# Patient Record
Sex: MALE | Race: WHITE | NOT HISPANIC OR LATINO | Employment: OTHER | URBAN - METROPOLITAN AREA
[De-identification: names, ages, dates, MRNs, and addresses within clinical notes are randomized per-mention and may not be internally consistent; named-entity substitution may affect disease eponyms.]

---

## 2018-11-14 ENCOUNTER — HISTORICAL (OUTPATIENT)
Dept: LAB | Facility: HOSPITAL | Age: 83
End: 2018-11-14

## 2018-11-14 LAB
ABS NEUT (OLG): 3.7 X10(3)/MCL (ref 1.5–6.9)
ALBUMIN SERPL-MCNC: 3.5 GM/DL (ref 3.4–5)
ALBUMIN/GLOB SERPL: 0.9 RATIO
ALP SERPL-CCNC: 68 UNIT/L (ref 30–113)
ALT SERPL-CCNC: 23 UNIT/L (ref 10–45)
AST SERPL-CCNC: 22 UNIT/L (ref 15–37)
BASOPHILS # BLD AUTO: 0.1 X10(3)/MCL (ref 0–0.1)
BASOPHILS NFR BLD AUTO: 1 % (ref 0–1)
BILIRUB SERPL-MCNC: 0.4 MG/DL (ref 0.1–0.9)
BILIRUBIN DIRECT+TOT PNL SERPL-MCNC: 0.1 MG/DL (ref 0–0.3)
BILIRUBIN DIRECT+TOT PNL SERPL-MCNC: 0.3 MG/DL
BUN SERPL-MCNC: 17 MG/DL (ref 10–20)
CALCIUM SERPL-MCNC: 9.4 MG/DL (ref 8–10.5)
CHLORIDE SERPL-SCNC: 104 MMOL/L (ref 100–108)
CO2 SERPL-SCNC: 32 MMOL/L (ref 21–35)
CREAT SERPL-MCNC: 1.48 MG/DL (ref 0.7–1.3)
EOSINOPHIL # BLD AUTO: 0.2 X10(3)/MCL (ref 0–0.6)
EOSINOPHIL NFR BLD AUTO: 2 % (ref 0–5)
ERYTHROCYTE [DISTWIDTH] IN BLOOD BY AUTOMATED COUNT: 13.2 % (ref 11.5–17)
GLOBULIN SER-MCNC: 3.9 GM/DL
GLUCOSE SERPL-MCNC: 88 MG/DL (ref 75–116)
HCT VFR BLD AUTO: 44.7 % (ref 42–52)
HGB BLD-MCNC: 14.1 GM/DL (ref 14–18)
IMM GRANULOCYTES # BLD AUTO: 0.01 10*3/UL (ref 0–0.02)
IMM GRANULOCYTES NFR BLD AUTO: 0.1 % (ref 0–0.43)
LDH SERPL-CCNC: 193 UNIT/L (ref 105–241)
LYMPHOCYTES # BLD AUTO: 2.5 X10(3)/MCL (ref 0.5–4.1)
LYMPHOCYTES NFR BLD AUTO: 35 % (ref 15–40)
MCH RBC QN AUTO: 30 PG (ref 27–34)
MCHC RBC AUTO-ENTMCNC: 32 GM/DL (ref 31–36)
MCV RBC AUTO: 96 FL (ref 80–99)
MONOCYTES # BLD AUTO: 0.6 X10(3)/MCL (ref 0–1.1)
MONOCYTES NFR BLD AUTO: 8 % (ref 4–12)
NEUTROPHILS # BLD AUTO: 3.7 X10(3)/MCL (ref 1.5–6.9)
NEUTROPHILS NFR BLD AUTO: 53 % (ref 43–75)
PLATELET # BLD AUTO: 257 X10(3)/MCL (ref 140–400)
PMV BLD AUTO: 10.3 FL (ref 6.8–10)
POTASSIUM SERPL-SCNC: 4.4 MMOL/L (ref 3.6–5.2)
PROT SERPL-MCNC: 7.4 GM/DL (ref 6.4–8.2)
RBC # BLD AUTO: 4.65 X10(6)/MCL (ref 4.7–6.1)
SODIUM SERPL-SCNC: 143 MMOL/L (ref 135–145)
WBC # SPEC AUTO: 7 X10(3)/MCL (ref 4.5–11.5)

## 2019-05-22 ENCOUNTER — HISTORICAL (OUTPATIENT)
Dept: LAB | Facility: HOSPITAL | Age: 84
End: 2019-05-22

## 2019-05-22 LAB
ABS NEUT (OLG): 3.6 X10(3)/MCL (ref 1.5–6.9)
ALBUMIN SERPL-MCNC: 3.8 GM/DL (ref 3.4–5)
ALBUMIN/GLOB SERPL: 1 RATIO
ALP SERPL-CCNC: 78 UNIT/L (ref 30–113)
ALT SERPL-CCNC: 21 UNIT/L (ref 10–45)
AST SERPL-CCNC: 21 UNIT/L (ref 15–37)
BASOPHILS # BLD AUTO: 0.1 X10(3)/MCL (ref 0–0.1)
BASOPHILS NFR BLD AUTO: 1 % (ref 0–1)
BILIRUB SERPL-MCNC: 0.6 MG/DL (ref 0.1–0.9)
BILIRUBIN DIRECT+TOT PNL SERPL-MCNC: 0.1 MG/DL (ref 0–0.3)
BILIRUBIN DIRECT+TOT PNL SERPL-MCNC: 0.5 MG/DL
BUN SERPL-MCNC: 19 MG/DL (ref 10–20)
CALCIUM SERPL-MCNC: 9.1 MG/DL (ref 8–10.5)
CHLORIDE SERPL-SCNC: 104 MMOL/L (ref 100–108)
CO2 SERPL-SCNC: 28 MMOL/L (ref 21–35)
CREAT SERPL-MCNC: 1.76 MG/DL (ref 0.7–1.3)
EOSINOPHIL # BLD AUTO: 0.2 X10(3)/MCL (ref 0–0.6)
EOSINOPHIL NFR BLD AUTO: 2 % (ref 0–5)
ERYTHROCYTE [DISTWIDTH] IN BLOOD BY AUTOMATED COUNT: 13 % (ref 11.5–17)
GLOBULIN SER-MCNC: 3.9 GM/DL
GLUCOSE SERPL-MCNC: 77 MG/DL (ref 75–116)
HCT VFR BLD AUTO: 45 % (ref 42–52)
HGB BLD-MCNC: 14.4 GM/DL (ref 14–18)
IMM GRANULOCYTES # BLD AUTO: 0.01 10*3/UL (ref 0–0.02)
IMM GRANULOCYTES NFR BLD AUTO: 0.1 % (ref 0–0.43)
LDH SERPL-CCNC: 193 UNIT/L (ref 105–241)
LYMPHOCYTES # BLD AUTO: 2.8 X10(3)/MCL (ref 0.5–4.1)
LYMPHOCYTES NFR BLD AUTO: 39 % (ref 15–40)
MCH RBC QN AUTO: 32 PG (ref 27–34)
MCHC RBC AUTO-ENTMCNC: 32 GM/DL (ref 31–36)
MCV RBC AUTO: 98 FL (ref 80–99)
MONOCYTES # BLD AUTO: 0.5 X10(3)/MCL (ref 0–1.1)
MONOCYTES NFR BLD AUTO: 7 % (ref 4–12)
NEUTROPHILS # BLD AUTO: 3.6 X10(3)/MCL (ref 1.5–6.9)
NEUTROPHILS NFR BLD AUTO: 50 % (ref 43–75)
PLATELET # BLD AUTO: 218 X10(3)/MCL (ref 140–400)
PMV BLD AUTO: 10 FL (ref 6.8–10)
POTASSIUM SERPL-SCNC: 4.5 MMOL/L (ref 3.6–5.2)
PROT SERPL-MCNC: 7.7 GM/DL (ref 6.4–8.2)
RBC # BLD AUTO: 4.57 X10(6)/MCL (ref 4.7–6.1)
SODIUM SERPL-SCNC: 141 MMOL/L (ref 135–145)
WBC # SPEC AUTO: 7.2 X10(3)/MCL (ref 4.5–11.5)

## 2019-07-22 ENCOUNTER — HISTORICAL (OUTPATIENT)
Dept: LAB | Facility: HOSPITAL | Age: 84
End: 2019-07-22

## 2019-07-22 LAB
ABS NEUT (OLG): 3.3 X10(3)/MCL (ref 1.5–6.9)
ALBUMIN SERPL-MCNC: 3.6 GM/DL (ref 3.4–5)
ALBUMIN/GLOB SERPL: 0.9 RATIO
ALP SERPL-CCNC: 83 UNIT/L (ref 30–113)
ALT SERPL-CCNC: 21 UNIT/L (ref 10–45)
AST SERPL-CCNC: 24 UNIT/L (ref 15–37)
BASOPHILS # BLD AUTO: 0.1 X10(3)/MCL (ref 0–0.1)
BASOPHILS NFR BLD AUTO: 1 % (ref 0–1)
BILIRUB SERPL-MCNC: 0.5 MG/DL (ref 0.1–0.9)
BILIRUBIN DIRECT+TOT PNL SERPL-MCNC: 0.1 MG/DL (ref 0–0.3)
BILIRUBIN DIRECT+TOT PNL SERPL-MCNC: 0.4 MG/DL
BUN SERPL-MCNC: 18 MG/DL (ref 10–20)
CALCIUM SERPL-MCNC: 8.6 MG/DL (ref 8–10.5)
CHLORIDE SERPL-SCNC: 104 MMOL/L (ref 100–108)
CO2 SERPL-SCNC: 27 MMOL/L (ref 21–35)
CREAT SERPL-MCNC: 1.6 MG/DL (ref 0.7–1.3)
EOSINOPHIL # BLD AUTO: 0.2 X10(3)/MCL (ref 0–0.6)
EOSINOPHIL NFR BLD AUTO: 3 % (ref 0–5)
ERYTHROCYTE [DISTWIDTH] IN BLOOD BY AUTOMATED COUNT: 13 % (ref 11.5–17)
GLOBULIN SER-MCNC: 3.8 GM/DL
GLUCOSE SERPL-MCNC: 81 MG/DL (ref 75–116)
HCT VFR BLD AUTO: 42.2 % (ref 42–52)
HGB BLD-MCNC: 13.8 GM/DL (ref 14–18)
IMM GRANULOCYTES # BLD AUTO: 0.01 10*3/UL (ref 0–0.02)
IMM GRANULOCYTES NFR BLD AUTO: 0.2 % (ref 0–0.43)
LDH SERPL-CCNC: 219 UNIT/L (ref 105–241)
LYMPHOCYTES # BLD AUTO: 2.5 X10(3)/MCL (ref 0.5–4.1)
LYMPHOCYTES NFR BLD AUTO: 38 % (ref 15–40)
MCH RBC QN AUTO: 31 PG (ref 27–34)
MCHC RBC AUTO-ENTMCNC: 33 GM/DL (ref 31–36)
MCV RBC AUTO: 96 FL (ref 80–99)
MONOCYTES # BLD AUTO: 0.5 X10(3)/MCL (ref 0–1.1)
MONOCYTES NFR BLD AUTO: 8 % (ref 4–12)
NEUTROPHILS # BLD AUTO: 3.3 X10(3)/MCL (ref 1.5–6.9)
NEUTROPHILS NFR BLD AUTO: 51 % (ref 43–75)
PLATELET # BLD AUTO: 212 X10(3)/MCL (ref 140–400)
PMV BLD AUTO: 9.7 FL (ref 6.8–10)
POTASSIUM SERPL-SCNC: 4.2 MMOL/L (ref 3.6–5.2)
PROT SERPL-MCNC: 7.4 GM/DL (ref 6.4–8.2)
RBC # BLD AUTO: 4.41 X10(6)/MCL (ref 4.7–6.1)
SODIUM SERPL-SCNC: 140 MMOL/L (ref 135–145)
WBC # SPEC AUTO: 6.5 X10(3)/MCL (ref 4.5–11.5)

## 2020-01-20 ENCOUNTER — HISTORICAL (OUTPATIENT)
Dept: LAB | Facility: HOSPITAL | Age: 85
End: 2020-01-20

## 2020-01-20 LAB
ABS NEUT (OLG): 3.7 X10(3)/MCL (ref 1.5–6.9)
ALBUMIN SERPL-MCNC: 3.9 GM/DL (ref 3.4–5)
ALBUMIN/GLOB SERPL: 1 RATIO
ALP SERPL-CCNC: 91 UNIT/L (ref 30–113)
ALT SERPL-CCNC: 22 UNIT/L (ref 10–45)
AST SERPL-CCNC: 20 UNIT/L (ref 15–37)
BASOPHILS # BLD AUTO: 0.1 X10(3)/MCL (ref 0–0.1)
BASOPHILS NFR BLD AUTO: 1 % (ref 0–1)
BILIRUB SERPL-MCNC: 0.4 MG/DL (ref 0.1–0.9)
BILIRUBIN DIRECT+TOT PNL SERPL-MCNC: 0.1 MG/DL (ref 0–0.3)
BILIRUBIN DIRECT+TOT PNL SERPL-MCNC: 0.3 MG/DL
BUN SERPL-MCNC: 18 MG/DL (ref 10–20)
CALCIUM SERPL-MCNC: 9.4 MG/DL (ref 8–10.5)
CHLORIDE SERPL-SCNC: 103 MMOL/L (ref 100–108)
CO2 SERPL-SCNC: 29 MMOL/L (ref 21–35)
CREAT SERPL-MCNC: 1.42 MG/DL (ref 0.7–1.3)
EOSINOPHIL # BLD AUTO: 0.2 X10(3)/MCL (ref 0–0.6)
EOSINOPHIL NFR BLD AUTO: 2 % (ref 0–5)
ERYTHROCYTE [DISTWIDTH] IN BLOOD BY AUTOMATED COUNT: 12.7 % (ref 11.5–17)
GLOBULIN SER-MCNC: 4 GM/DL
GLUCOSE SERPL-MCNC: 74 MG/DL (ref 75–116)
HCT VFR BLD AUTO: 45.5 % (ref 42–52)
HGB BLD-MCNC: 14.8 GM/DL (ref 14–18)
IMM GRANULOCYTES # BLD AUTO: 0.01 10*3/UL (ref 0–0.02)
IMM GRANULOCYTES NFR BLD AUTO: 0.1 % (ref 0–0.43)
LYMPHOCYTES # BLD AUTO: 2.9 X10(3)/MCL (ref 0.5–4.1)
LYMPHOCYTES NFR BLD AUTO: 40 % (ref 15–40)
MCH RBC QN AUTO: 32 PG (ref 27–34)
MCHC RBC AUTO-ENTMCNC: 32 GM/DL (ref 31–36)
MCV RBC AUTO: 98 FL (ref 80–99)
MONOCYTES # BLD AUTO: 0.5 X10(3)/MCL (ref 0–1.1)
MONOCYTES NFR BLD AUTO: 6 % (ref 4–12)
NEUTROPHILS # BLD AUTO: 3.7 X10(3)/MCL (ref 1.5–6.9)
NEUTROPHILS NFR BLD AUTO: 50 % (ref 43–75)
PLATELET # BLD AUTO: 236 X10(3)/MCL (ref 140–400)
PMV BLD AUTO: 9.9 FL (ref 6.8–10)
POTASSIUM SERPL-SCNC: 4.3 MMOL/L (ref 3.6–5.2)
PROT SERPL-MCNC: 7.9 GM/DL (ref 6.4–8.2)
RBC # BLD AUTO: 4.62 X10(6)/MCL (ref 4.7–6.1)
SODIUM SERPL-SCNC: 141 MMOL/L (ref 135–145)
WBC # SPEC AUTO: 7.3 X10(3)/MCL (ref 4.5–11.5)

## 2023-01-24 DIAGNOSIS — R22.1 NECK MASS: Primary | ICD-10-CM

## 2023-01-25 ENCOUNTER — OFFICE VISIT (OUTPATIENT)
Dept: OTOLARYNGOLOGY | Facility: CLINIC | Age: 88
End: 2023-01-25
Payer: MEDICARE

## 2023-01-25 VITALS
WEIGHT: 163.19 LBS | DIASTOLIC BLOOD PRESSURE: 91 MMHG | SYSTOLIC BLOOD PRESSURE: 136 MMHG | HEART RATE: 82 BPM | TEMPERATURE: 98 F

## 2023-01-25 DIAGNOSIS — R22.1 NECK MASS: ICD-10-CM

## 2023-01-25 DIAGNOSIS — C07 CARCINOMA OF PAROTID GLAND: Primary | ICD-10-CM

## 2023-01-25 PROCEDURE — 99213 OFFICE O/P EST LOW 20 MIN: CPT | Mod: PBBFAC | Performed by: STUDENT IN AN ORGANIZED HEALTH CARE EDUCATION/TRAINING PROGRAM

## 2023-01-25 RX ORDER — TAMSULOSIN HYDROCHLORIDE 0.4 MG/1
1 CAPSULE ORAL NIGHTLY
COMMUNITY
Start: 2022-10-26 | End: 2023-05-25

## 2023-01-25 RX ORDER — TRIMETHOPRIM 100 MG/1
100 TABLET ORAL DAILY
COMMUNITY
Start: 2022-10-26

## 2023-01-25 NOTE — PROGRESS NOTES
[facility]  Otolaryngology-Head & Neck Surgery    Office Visit    Erik Lundy  [id number]  1935    CC: parotid mass    HPI: Erik Lundy is a 87 y.o. 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  Patient is not sure what type of skin cancer he had, but he was treated by Dr. Biswas with Mohs resection.  Patient states he noticed the mass about a month ago and was initially evaluated by Dr. Jerry.  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  He also reports 7 lb weight loss over the last 2 months.  He denies other new onset symptoms including dysphagia, odynophagia, dysphonia, dyspnea, hematemesis, hemoptysis, facial weakness, new skin lesions or additional masses in the head or neck.  Non-contrasted CT neck was performed yesterday which showed approximately 2 cm mass of the left parotid without associated cervical lymphadenopathy.    ROS:   General: Negative except per HPI  Skin: Denies rash, ulcer, or lesion.  Eyes: Denies vision changes or diplopia.  Ears: Negative except per HPI  Nose: Negative except per HPI  Throat/mouth: Negative except per HPI  Cardiovascular: Negative except per HPI  Respiratory: Negative except per HPI  Neck: Negative except per HPI  Endocrine: Negative except per HPI  Neurologic: Negative except per HPI    Review of patient's allergies indicates:   Allergen Reactions    Fluticasone propionate        Past Medical History:   Diagnosis Date    Skin cancer        History reviewed. No pertinent surgical history.    Social History     Socioeconomic History    Marital status: Single   Tobacco Use    Smoking status: Never    Smokeless tobacco: Never       History reviewed. No pertinent family history.    Outpatient Encounter Medications as of 1/25/2023   Medication Sig Dispense Refill    tamsulosin (FLOMAX) 0.4 mg Cap Take 1  capsule by mouth every evening.      trimethoprim (TRIMPEX) 100 mg Tab Take 100 mg by mouth.       No facility-administered encounter medications on file as of 1/25/2023.       PHYSICAL EXAM:  Vitals:    01/25/23 0937   BP: (!) 136/91   Pulse: 82   Temp: 97.5 °F (36.4 °C)       General Appearance: well nourished, well-developed, alert, oriented, in no acute distress  Head/Face: NC, AT, 2 cm firm mass involving the left tail of parotid, appears to be fixed to the adjacent SCM  Eyes: PEERLA, EOMI, normal conjunctiva  Ears: Hears well at normal conversation volume  AD: external normal, ear canal normal, TM intact, no middle ear fluid  AS: external normal, ear canal normal, TM intact, no middle ear fluid  Nose: septum midline, no inferior turbinate hypertrophy, no polyps  OC/OP: dentition moderate, no oral lesions, FOM and BOT soft  Nasopharynx, Hypopharynx, and Larynx: indirect visualization attempted, limited view due to patient intolerance  Neck: 2 cm firm mass involving the left tail of parotid, appears to be fixed to the adjacent SCM, no palpable lymph nodes, non-tender, thyroid- no nodules or goiter  Neuro: CN II - XII intact  Psychiatric: oriented to time, place and person, no depression, anxiety or agitation      Procedures:Flexible Fiberoptic Laryngoscopy/Nasopharyngoscopy via left nare    Procedure in Detail: Informed consent was obtained from the patient after explanation of procedure, indications, risks and benefits. Flexible endoscopy was performed through the nasal passages. The nasal cavity, nasopharynx, oropharynx, hypopharynx and larynx were adequately visualized. The true vocal cords and arytenoids were examined during phonation and repose.    Anesthesia: Topical Neosynephrine / Tetracaine  Adverse Events: None  Resident Surgeon: Constantino Younger MD  Blood loss: none  Condition: good    Findings:  NP/OP: no masses/lesions of NC, eustachian tube, fossa of Rosenmuller, no adenoid  hypertrophy  BOT/vallecula: no lingual hypertrophy, no masses/lesions, no secretions obscuring visualization  Piriform sinuses/post-cricoid: no masses/lesions, no pooling of secretions  Epiglottis: lingual and laryngeal surfaces within normal limits  Arytenoids/FVFs: no masses/lesions, no edema, bilateral mobility  TVCs: bilateral cord mobility, no masses or lesions  No aspiration, no pooled secretions  No sign of malignancy      PERTINENT DATA:  Imaging:  CT neck non-contrasted      Pathology:  FNA left parotid      ASSESSMENT:  Erik Lundy is a 87 y.o. male with history of cutaneous SCCa involving the left cheek s/p WLE in 2016that presents with left parotid mass, FNA shows poorly differentiated carcinoma.    PLAN:  -- Discussed pathology findings in detail with patient and explained the next steps required for work up including obtaining a PET/CT to rule out metastatic disease prior to treatment.  All questions answered.  -- Ordered PET/CT.  Follow up imaging results and will update patient at that time.  -- Will also discuss case with H&N staff once workup is completed.    RTC in 2 weeks to discuss results of PET/CT and discuss plan for further treatment.    Constantino Younger MD  [facility]  10:11 AM 01/25/2023    Dr. Alex was present during the patient encounter and participated in examination and evaluation of the patient.

## 2023-02-16 ENCOUNTER — HOSPITAL ENCOUNTER (OUTPATIENT)
Dept: RADIOLOGY | Facility: HOSPITAL | Age: 88
Discharge: HOME | End: 2023-02-16
Attending: STUDENT IN AN ORGANIZED HEALTH CARE EDUCATION/TRAINING PROGRAM
Payer: MEDICARE

## 2023-02-16 DIAGNOSIS — C07 CARCINOMA OF PAROTID GLAND: ICD-10-CM

## 2023-02-16 PROCEDURE — A9552 F18 FDG: HCPCS

## 2023-02-16 PROCEDURE — 78815 PET IMAGE W/CT SKULL-THIGH: CPT | Mod: TC,PI

## 2023-02-24 NOTE — PROGRESS NOTES
I reviewed the history and physical exam with the resident.   I agree with findings and plan.    Frandy Rae M.D.

## 2023-03-02 ENCOUNTER — OFFICE VISIT (OUTPATIENT)
Dept: OTOLARYNGOLOGY | Facility: CLINIC | Age: 88
End: 2023-03-02
Payer: MEDICARE

## 2023-03-02 VITALS
SYSTOLIC BLOOD PRESSURE: 126 MMHG | TEMPERATURE: 98 F | WEIGHT: 158 LBS | DIASTOLIC BLOOD PRESSURE: 85 MMHG | HEART RATE: 98 BPM

## 2023-03-02 DIAGNOSIS — E04.1 THYROID NODULE: ICD-10-CM

## 2023-03-02 DIAGNOSIS — C07 CARCINOMA OF PAROTID GLAND: Primary | ICD-10-CM

## 2023-03-02 DIAGNOSIS — R91.8 LUNG NODULES: ICD-10-CM

## 2023-03-02 PROCEDURE — 99214 OFFICE O/P EST MOD 30 MIN: CPT | Mod: PBBFAC | Performed by: STUDENT IN AN ORGANIZED HEALTH CARE EDUCATION/TRAINING PROGRAM
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
## 2023-03-10 ENCOUNTER — HOSPITAL ENCOUNTER (OUTPATIENT)
Dept: RADIOLOGY | Facility: HOSPITAL | Age: 88
Discharge: HOME | End: 2023-03-10
Attending: STUDENT IN AN ORGANIZED HEALTH CARE EDUCATION/TRAINING PROGRAM
Payer: MEDICARE

## 2023-03-10 DIAGNOSIS — C07 CARCINOMA OF PAROTID GLAND: ICD-10-CM

## 2023-03-10 DIAGNOSIS — R91.8 LUNG NODULES: ICD-10-CM

## 2023-03-10 DIAGNOSIS — C07 CARCINOMA OF PAROTID GLAND: Primary | ICD-10-CM

## 2023-03-10 DIAGNOSIS — E04.1 THYROID NODULE: ICD-10-CM

## 2023-03-10 LAB
CREAT SERPL-MCNC: 1.83 MG/DL (ref 0.73–1.18)
GFR SERPLBLD CREATININE-BSD FMLA CKD-EPI: 35 MLS/MIN/1.73/M2

## 2023-03-10 PROCEDURE — 25500020 PHARM REV CODE 255

## 2023-03-10 PROCEDURE — 70481 CT ORBIT/EAR/FOSSA W/DYE: CPT | Mod: TC

## 2023-03-10 PROCEDURE — 70491 CT SOFT TISSUE NECK W/DYE: CPT | Mod: TC

## 2023-03-10 PROCEDURE — 76536 US EXAM OF HEAD AND NECK: CPT | Mod: TC

## 2023-03-10 PROCEDURE — 71260 CT THORAX DX C+: CPT | Mod: TC

## 2023-03-10 PROCEDURE — 82565 ASSAY OF CREATININE: CPT | Performed by: STUDENT IN AN ORGANIZED HEALTH CARE EDUCATION/TRAINING PROGRAM

## 2023-03-10 RX ADMIN — IOHEXOL 100 ML: 350 INJECTION, SOLUTION INTRAVENOUS at 08:03

## 2023-03-15 ENCOUNTER — OFFICE VISIT (OUTPATIENT)
Dept: OTOLARYNGOLOGY | Facility: CLINIC | Age: 88
End: 2023-03-15
Payer: MEDICARE

## 2023-03-15 DIAGNOSIS — C07 CARCINOMA OF PAROTID GLAND: Primary | ICD-10-CM

## 2023-03-15 DIAGNOSIS — R91.8 LUNG NODULE, MULTIPLE: ICD-10-CM

## 2023-03-15 DIAGNOSIS — E04.1 THYROID NODULE: ICD-10-CM
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
## 2023-03-23 ENCOUNTER — HOSPITAL ENCOUNTER (OUTPATIENT)
Dept: INTERVENTIONAL RADIOLOGY/VASCULAR | Facility: HOSPITAL | Age: 88
Discharge: HOME | End: 2023-03-23
Attending: STUDENT IN AN ORGANIZED HEALTH CARE EDUCATION/TRAINING PROGRAM
Payer: MEDICARE

## 2023-03-23 DIAGNOSIS — E04.1 THYROID NODULE: ICD-10-CM

## 2023-03-23 PROCEDURE — 88108 CYTOPATH CONCENTRATE TECH: CPT | Mod: TC | Performed by: STUDENT IN AN ORGANIZED HEALTH CARE EDUCATION/TRAINING PROGRAM

## 2023-03-23 PROCEDURE — 10005 FNA BX W/US GDN 1ST LES: CPT

## 2023-03-27 LAB
ESTROGEN SERPL-MCNC: NORMAL PG/ML
INSULIN SERPL-ACNC: NORMAL U[IU]/ML
LAB AP CLINICAL INFORMATION: NORMAL
LAB AP EBUS/EUS SPECIMEN: NORMAL
LAB AP GROSS DESCRIPTION: NORMAL

## 2023-03-30 ENCOUNTER — OFFICE VISIT (OUTPATIENT)
Dept: OTOLARYNGOLOGY | Facility: CLINIC | Age: 88
End: 2023-03-30
Payer: MEDICARE

## 2023-03-30 VITALS — SYSTOLIC BLOOD PRESSURE: 105 MMHG | HEART RATE: 76 BPM | DIASTOLIC BLOOD PRESSURE: 61 MMHG | WEIGHT: 160.19 LBS

## 2023-03-30 DIAGNOSIS — E04.1 THYROID NODULE: ICD-10-CM

## 2023-03-30 DIAGNOSIS — R91.8 LUNG NODULES: ICD-10-CM

## 2023-03-30 DIAGNOSIS — C07 CARCINOMA OF PAROTID GLAND: Primary | ICD-10-CM

## 2023-03-30 PROCEDURE — 99212 OFFICE O/P EST SF 10 MIN: CPT | Mod: PBBFAC | Performed by: STUDENT IN AN ORGANIZED HEALTH CARE EDUCATION/TRAINING PROGRAM
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
## 2023-04-11 ENCOUNTER — OFFICE VISIT (OUTPATIENT)
Dept: FAMILY MEDICINE | Facility: CLINIC | Age: 88
End: 2023-04-11
Payer: MEDICARE

## 2023-04-11 VITALS
DIASTOLIC BLOOD PRESSURE: 72 MMHG | RESPIRATION RATE: 18 BRPM | OXYGEN SATURATION: 98 % | WEIGHT: 161.81 LBS | TEMPERATURE: 98 F | HEART RATE: 69 BPM | HEIGHT: 72 IN | SYSTOLIC BLOOD PRESSURE: 119 MMHG | BODY MASS INDEX: 21.92 KG/M2

## 2023-04-11 DIAGNOSIS — C07 CARCINOMA OF PAROTID GLAND: ICD-10-CM

## 2023-04-11 PROBLEM — L98.9 DISORDER OF SKIN OF LOWER EXTREMITY: Status: ACTIVE | Noted: 2023-04-11

## 2023-04-11 PROBLEM — E04.2 MULTINODULAR GOITER: Status: ACTIVE | Noted: 2023-04-11

## 2023-04-11 PROBLEM — C44.329 SQUAMOUS CELL CARCINOMA OF SKIN OF CHEEK: Status: ACTIVE | Noted: 2023-04-11

## 2023-04-11 PROBLEM — C79.2: Status: ACTIVE | Noted: 2023-04-11

## 2023-04-11 PROBLEM — C44.90 SKIN CANCER: Status: ACTIVE | Noted: 2023-04-11

## 2023-04-11 PROCEDURE — 99214 OFFICE O/P EST MOD 30 MIN: CPT | Mod: PBBFAC

## 2023-04-11 NOTE — PROGRESS NOTES
[facility] FM Clinic Note    CHIEF COMPLAINT:  Chief Complaint   Patient presents with    Establish Care    Cyst     Patient reports a cancerous cyst on left side of face.       HISTORY OF  PRESENT ILLNESS:  Erik Lundy is a 87 y.o. 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 who presents to clinic today for [facility] FM clinic after being referred to ENT.     Acute issues:  None  Patient has PCP Dr. Benedicto Vargas, who he saw on 04/10/2023 with 1 month f/u     Chronic issues:  1) Metastatic Squamous Cell carcinoma L cheek  initial biopsy by Dr. Simons  Mohs surgery by Dr. Esquivel 10/11/16  Primary source unknown at this time  Previously seen Dr. 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  No cervical lymphadenopathy  Follows ENT; last seen in-office 03/30/23 with f/u on 04/26/2023; performed Flexible Fiberoptic Laryngoscopy/Nasopharyngoscopy with no significant findings  FNA done 01/19/2023; positive for malignant cells; high grade primary parotid malignancy vs metastatic carcinoma   Has home health 3x/week    3) Right thyroid nodule  U/S done on 03/10/2023   Revealed Mostly cystic nodules in the right and left alyson thyroid with exception of a single solid slightly hypoechoic nodule; benign   Recommended repeat U/S in 1, 2, 3, 5 years   FNA of R thyroid nodule revealed benign thyroid nodule on 03/23/2023    4) Bilateral upper lobe pulmonary nodules  CT chest on 3/10 is reflective of PET scan done on 01/25/2023; otherwise revealed scatted punctate 1-2 mm nodules in the Right lower lobe, findings consistent w/metastatic disease per radiology  Has f/u  with pulmonary on 05/01/2023 with recommendations from ENT of requiring endobronchial ultrasound and plan for patient to be seen sooner than scheduled date.    REVIEW OF SYSTEMS:  See HPI    Past Medical History:   Diagnosis Date    Skin cancer     No past surgical history on file.   Social History     Socioeconomic History    Marital status: Single   Tobacco Use    Smoking status: Never    Smokeless tobacco: Never       Review of Most Recent Wound Care-Related Labs:  Lab Results   Component Value Date/Time    WBC 7.3 01/20/2020 12:06 PM    RBC 4.62 (L) 01/20/2020 12:06 PM    HGB 14.8 01/20/2020 12:06 PM    HCT 45.5 01/20/2020 12:06 PM    MCV 98 01/20/2020 12:06 PM    MCH 32 01/20/2020 12:06 PM    CREATININE 1.83 (H) 03/10/2023 07:31 AM        PHYSICAL EXAMINATION:  Blood pressure 119/72, pulse 69, temperature 98.3 °F (36.8 °C), temperature source Oral, resp. rate 18, height 6' (1.829 m), weight 73.4 kg (161 lb 12.8 oz), SpO2 98 %.    General:  VSS. No acute distress.   Respiratory: clear to ascultation in all lung fields  Cardiovascular:  RRR, no additional heart sounds heard.  ABD: BS +, soft, nontender  Musculoskeletal:  Full range of motion of all extremities; no joint deformities or peripheral edema. DP 2+ pulses palpable bilaterally.   Neurologic:  A&O X 3.    Psychiatric:  Calm, cooperative. Mood and effect normal.  Responses appropriate.        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  Benign R thyroid nodule     Informed patient about PET scan results on 01/25/2023 and current treatment plan/ upcoming appointment dates; provided paper copy of PET-scan results and last ENT note with current plan  Patient informed use he will bring the PET-scan results and last ENT note to PCP's office on  04/12/2023  Contacted Patient's PCP office, spoke with office nurse manager about patient being seen/referred here by ENT  Informed PCP office nurse manager about patient bringing PET-scan results and last ENT note to PCP's office; provided my name and clinic number for PCP to contact in case of any further questions      - Return to clinic DALI Pena MD     [facility] Family Medicine Resident HO-1  04/11/2023

## 2023-04-12 ENCOUNTER — TELEPHONE (OUTPATIENT)
Dept: FAMILY MEDICINE | Facility: CLINIC | Age: 88
End: 2023-04-12
Payer: MEDICARE

## 2023-04-12 NOTE — PROGRESS NOTES
Correction  -  pulmonary appt was just moved up to 4/17, will ask resident to call pt because the AVS we gave this day had the 5/2023 appt which we circled and emphasized

## 2023-04-12 NOTE — TELEPHONE ENCOUNTER
Please call patient - his pulmonary appt got moved up to 4/17  but we had printed the AVS and it had the 5/2023 appt and we circled it, please make sure he knows to come next week to the entrance like we told him

## 2023-04-12 NOTE — PROGRESS NOTES
I reviewed History, PE, A/P and medical record.  Services provided in outpatient department of a teaching hospital/facility, I was immediately available.  I agree with resident, care reasonable and necessary.   I evaluated the patient with resident at time of visit, participated in key parts of H/P and management was discussed.  Firm fixed NT 3cm mass left angle parotid tail/angle of mandible, no fluctuance    20min f2f with pt, informed of results of 1/2023 PET scan with concern for cancer in lungs, pt reports was unaware, stressed need for FU, importance of pulmonary appt in few weeks, pt reports understanding of diagnostic possibilities and need for further testing, also understands that failure to present for testing/appt could result in adverse health outcome, declined to have labs or other tests done with us today, wants to have his [place] PCP FU everything, advised that he may have been referred to us since he does not have a supplement and tests may cause large out of pocket expense to him, advised checking with [employer] and applying for extra benefits    I told patient if he changes his mind and wants us to do anything to help him he just needs to call and he will still be a patient in our clinic, advised resident to print reports and verbally checkout to PCP office in [place] which he did, no return appt made at pt request    Ernestina Horton MD  [facility] Family Medicine Residency - [facility]

## 2023-04-13 ENCOUNTER — TELEPHONE (OUTPATIENT)
Dept: FAMILY MEDICINE | Facility: CLINIC | Age: 88
End: 2023-04-13
Payer: MEDICARE

## 2023-04-13 NOTE — TELEPHONE ENCOUNTER
Called and informed patient about pulmonology appointment date change from 05/01/2023 to 04/17/2023 at 0930. Patient understood.    Kenn Pena MD     [facility] Family Medicine Resident HO-1  04/13/2023

## 2023-04-17 ENCOUNTER — TELEPHONE (OUTPATIENT)
Dept: PULMONOLOGY | Facility: CLINIC | Age: 88
End: 2023-04-17

## 2023-04-17 ENCOUNTER — OFFICE VISIT (OUTPATIENT)
Dept: PULMONOLOGY | Facility: CLINIC | Age: 88
End: 2023-04-17
Payer: MEDICARE

## 2023-04-17 VITALS
OXYGEN SATURATION: 98 % | SYSTOLIC BLOOD PRESSURE: 137 MMHG | TEMPERATURE: 97 F | WEIGHT: 164.38 LBS | RESPIRATION RATE: 20 BRPM | BODY MASS INDEX: 22.26 KG/M2 | DIASTOLIC BLOOD PRESSURE: 74 MMHG | HEIGHT: 72 IN | HEART RATE: 62 BPM

## 2023-04-17 DIAGNOSIS — R91.8 MULTIPLE LUNG NODULES: Primary | ICD-10-CM

## 2023-04-17 DIAGNOSIS — R91.8 MULTIPLE LUNG NODULES: ICD-10-CM

## 2023-04-17 DIAGNOSIS — R91.8 LUNG NODULES: ICD-10-CM

## 2023-04-17 DIAGNOSIS — C07 CARCINOMA OF PAROTID GLAND: ICD-10-CM

## 2023-04-17 DIAGNOSIS — C07 CARCINOMA OF PAROTID GLAND: Primary | ICD-10-CM

## 2023-04-17 PROCEDURE — 99214 OFFICE O/P EST MOD 30 MIN: CPT | Mod: PBBFAC

## 2023-04-17 PROCEDURE — 99204 PR OFFICE/OUTPT VISIT, NEW, LEVL IV, 45-59 MIN: ICD-10-PCS | Mod: S$PBB,,, | Performed by: HOSPITALIST

## 2023-04-17 PROCEDURE — 99204 OFFICE O/P NEW MOD 45 MIN: CPT | Mod: S$PBB,,, | Performed by: HOSPITALIST

## 2023-04-17 NOTE — TELEPHONE ENCOUNTER
Good afternoon Dr. Radha Lara saw Mr. Lundy this morning in lung mass clinic and has ordered a needle bioplsy in IR of the 1.9cm spiculated RUL nodule.  He declined a bronch/EBUS and Dr. Garcia felt that is would be of low yield anyway.    Pls let me know if you need anything.  Alva

## 2023-04-17 NOTE — PROGRESS NOTES
Subjective:     Chief Complaint: No chief complaint on file.      HPI: Erik Lundy is a 87 y.o. male who was diagnosed with squamous cell carcinoma of his left cheek in 2016.  He has been diagnosed with left parotid poorly differentiated carcinoma in January 2023 by fine-needle aspiration biopsy.  PET scan at that time showed hypermetabolism in the left parotid gland, bilateral upper lung nodules and right thyroid.  Thyroid has been biopsied showing benign tissue.    The PET scan from January of 2023 shows a spiculated nodule in the right upper lung measuring 1.9 cm with an SUV of 4.8.  A left upper lung nodule measuring 9 mm has an SUV of 2.9.  And there is no mediastinal adenopathy seen.    CT of the chest from March of 2023 shows no change compared to the PET scan of January 2023.    Today's visit:  Patient comes to the office today for initial visit in the [facility].  He is very poor historian and nose little details of his current status.  Apparently is to go back to ENT office soon.  He is not been seen by an oncologist or radiation therapist.  He is currently asymptomatic.  He has no chest pain.  No shortness of breath.  No hemoptysis.  No weight loss.  No lack of appetite.    Past Medical History:   Diagnosis Date    Skin cancer          Review of Systems   Constitutional:  Negative for chills, fever and malaise/fatigue.   HENT:  Negative for sinus pain.    Respiratory:  Negative for cough, hemoptysis, sputum production, shortness of breath, wheezing and stridor.    Cardiovascular:  Negative for chest pain, palpitations, orthopnea, claudication and leg swelling.   Gastrointestinal:  Negative for constipation, heartburn and vomiting.   Musculoskeletal:  Negative for falls, joint pain, myalgias and neck pain.   Skin:  Negative for rash.   Neurological:  Negative for dizziness, speech change, focal weakness, seizures, loss of consciousness and weakness.   Psychiatric/Behavioral:  Negative for  depression and suicidal ideas. The patient is not nervous/anxious.          Social History     Socioeconomic History    Marital status: Single   Tobacco Use    Smoking status: Never    Smokeless tobacco: Never   Substance and Sexual Activity    Alcohol use: Yes     Alcohol/week: 3.0 standard drinks     Types: 3 Cans of beer per week     Comment: 3 cans of beer on the weekend    Drug use: Never         Current Outpatient Medications   Medication Instructions    tamsulosin (FLOMAX) 0.4 mg Cap 1 capsule, Oral, Nightly    trimethoprim (TRIMPEX) 100 mg, Oral, Daily             Objective:   /74 (BP Location: Left arm, Patient Position: Sitting, BP Method: Medium (Automatic))   Pulse 62   Temp 97.3 °F (36.3 °C)   Resp 20   Ht 6' (1.829 m)   Wt 74.6 kg (164 lb 6.4 oz)   SpO2 98%   BMI 22.30 kg/m²     Physical Exam  Constitutional:       General: He is not in acute distress.     Appearance: Normal appearance. He is normal weight. He is not ill-appearing, toxic-appearing or diaphoretic.   HENT:      Head: Normocephalic and atraumatic.      Right Ear: External ear normal.      Left Ear: External ear normal.      Nose: No congestion or rhinorrhea.      Mouth/Throat:      Mouth: Mucous membranes are moist.      Pharynx: Oropharynx is clear. No oropharyngeal exudate or posterior oropharyngeal erythema.   Eyes:      General: No scleral icterus.        Right eye: No discharge.         Left eye: No discharge.      Extraocular Movements: Extraocular movements intact.      Pupils: Pupils are equal, round, and reactive to light.   Neck:      Vascular: No carotid bruit.      Comments: Changes of left facial surgery that are well healed.  There is also enlarged left from parotid gland.  Cardiovascular:      Rate and Rhythm: Normal rate and regular rhythm.      Heart sounds: Normal heart sounds. No murmur heard.    No gallop.   Pulmonary:      Effort: No respiratory distress.      Breath sounds: Normal breath sounds. No  stridor. No wheezing, rhonchi or rales.   Chest:      Chest wall: No tenderness.   Abdominal:      General: Abdomen is flat. Bowel sounds are normal. There is no distension.      Palpations: Abdomen is soft. There is no mass.      Tenderness: There is no abdominal tenderness. There is no guarding or rebound.   Musculoskeletal:         General: No swelling, tenderness, deformity or signs of injury. Normal range of motion.      Cervical back: No rigidity or tenderness.      Right lower leg: No edema.      Left lower leg: No edema.   Lymphadenopathy:      Cervical: No cervical adenopathy.   Skin:     Coloration: Skin is not jaundiced.      Findings: No bruising, lesion or rash.   Neurological:      General: No focal deficit present.      Mental Status: He is alert and oriented to person, place, and time.      Cranial Nerves: No cranial nerve deficit.      Sensory: No sensory deficit.      Motor: No weakness.      Coordination: Coordination normal.      Gait: Gait normal.      Deep Tendon Reflexes: Reflexes normal.   Psychiatric:         Mood and Affect: Mood normal.         Behavior: Behavior normal.         Thought Content: Thought content normal.         Judgment: Judgment normal.         Imaging:  I have personally reviewed the pertinent recent imaging.    CT and PET scan as described above from January 2023 and March of 2023.  This shows a 1.9 cm nodule in the right upper lung which is spiculated and moderately hypermetabolic.  There is a smaller nodule is also in the left upper lung with minimal uptake.  There has been no change in the scans between January and March.    Assessment:     Bilateral upper lobe nodules that are very concerning for malignancy by the spiculated appearance and SUV uptake.  Right measures 1.9 cm with an SUV of 4.8.  Squamous cell carcinoma of the left cheek diagnosed in 2016 status post surgical resection.  Left parotid non differentiated carcinoma diagnosed in January of 2023 by  fine-needle aspiration.  Patient is a nonsmoker.    Plan:     I discussed options of biopsy with the patient of bronchoscopy versus fine-needle aspiration.  Certainly bronchoscopy would have a relatively low yield and fine-needle aspiration would have a higher yield but also higher complication rate.  I gave him his options and he feels more comfortable with a fine-needle aspiration.  We therefore will order a fine-needle aspiration of his right upper lung nodule.  Continue follow with the ENT and consideration for referral to Oncology.    Compa Garcia MD

## 2023-05-01 DIAGNOSIS — C07 CARCINOMA OF PAROTID GLAND: Primary | ICD-10-CM

## 2023-05-01 DIAGNOSIS — S27.399A: ICD-10-CM

## 2023-05-02 ENCOUNTER — LAB VISIT (OUTPATIENT)
Dept: LAB | Facility: HOSPITAL | Age: 88
End: 2023-05-02
Attending: HOSPITALIST
Payer: MEDICARE

## 2023-05-02 DIAGNOSIS — C07 CARCINOMA OF PAROTID GLAND: ICD-10-CM

## 2023-05-02 DIAGNOSIS — E04.1 THYROID NODULE: ICD-10-CM

## 2023-05-02 DIAGNOSIS — S27.399A: ICD-10-CM

## 2023-05-02 DIAGNOSIS — R91.8 LUNG NODULES: ICD-10-CM

## 2023-05-02 LAB
ALBUMIN SERPL-MCNC: 4.1 G/DL (ref 3.4–4.8)
ALBUMIN/GLOB SERPL: 1.2 RATIO (ref 1.1–2)
ALP SERPL-CCNC: 105 UNIT/L (ref 40–150)
ALT SERPL-CCNC: 11 UNIT/L (ref 0–55)
AST SERPL-CCNC: 17 UNIT/L (ref 5–34)
BASOPHILS # BLD AUTO: 0.06 X10(3)/MCL
BASOPHILS NFR BLD AUTO: 0.9 %
BILIRUBIN DIRECT+TOT PNL SERPL-MCNC: 0.6 MG/DL
BUN SERPL-MCNC: 18.7 MG/DL (ref 8.4–25.7)
CALCIUM SERPL-MCNC: 9.6 MG/DL (ref 8.8–10)
CHLORIDE SERPL-SCNC: 102 MMOL/L (ref 98–107)
CO2 SERPL-SCNC: 29 MMOL/L (ref 23–31)
CREAT SERPL-MCNC: 1.78 MG/DL (ref 0.73–1.18)
EOSINOPHIL # BLD AUTO: 0.17 X10(3)/MCL (ref 0–0.9)
EOSINOPHIL NFR BLD AUTO: 2.6 %
ERYTHROCYTE [DISTWIDTH] IN BLOOD BY AUTOMATED COUNT: 13.1 % (ref 11.5–17)
GFR SERPLBLD CREATININE-BSD FMLA CKD-EPI: 36 MLS/MIN/1.73/M2
GLOBULIN SER-MCNC: 3.4 GM/DL (ref 2.4–3.5)
GLUCOSE SERPL-MCNC: 94 MG/DL (ref 82–115)
HCT VFR BLD AUTO: 42.1 % (ref 42–52)
HGB BLD-MCNC: 13.8 G/DL (ref 14–18)
IMM GRANULOCYTES # BLD AUTO: 0.01 X10(3)/MCL (ref 0–0.04)
IMM GRANULOCYTES NFR BLD AUTO: 0.2 %
LYMPHOCYTES # BLD AUTO: 1.91 X10(3)/MCL (ref 0.6–4.6)
LYMPHOCYTES NFR BLD AUTO: 29.6 %
MCH RBC QN AUTO: 30.9 PG (ref 27–31)
MCHC RBC AUTO-ENTMCNC: 32.8 G/DL (ref 33–36)
MCV RBC AUTO: 94.4 FL (ref 80–94)
MONOCYTES # BLD AUTO: 0.48 X10(3)/MCL (ref 0.1–1.3)
MONOCYTES NFR BLD AUTO: 7.4 %
NEUTROPHILS # BLD AUTO: 3.83 X10(3)/MCL (ref 2.1–9.2)
NEUTROPHILS NFR BLD AUTO: 59.3 %
NRBC BLD AUTO-RTO: 0 %
PLATELET # BLD AUTO: 262 X10(3)/MCL (ref 130–400)
PMV BLD AUTO: 9.7 FL (ref 7.4–10.4)
POTASSIUM SERPL-SCNC: 4.6 MMOL/L (ref 3.5–5.1)
PROT SERPL-MCNC: 7.5 GM/DL (ref 5.8–7.6)
RBC # BLD AUTO: 4.46 X10(6)/MCL (ref 4.7–6.1)
SODIUM SERPL-SCNC: 141 MMOL/L (ref 136–145)
TSH SERPL-ACNC: 2.22 UIU/ML (ref 0.35–4.94)
WBC # SPEC AUTO: 6.46 X10(3)/MCL (ref 4.5–11.5)

## 2023-05-02 PROCEDURE — 85025 COMPLETE CBC W/AUTO DIFF WBC: CPT

## 2023-05-02 PROCEDURE — 80053 COMPREHEN METABOLIC PANEL: CPT

## 2023-05-02 PROCEDURE — 84443 ASSAY THYROID STIM HORMONE: CPT

## 2023-05-02 PROCEDURE — 36415 COLL VENOUS BLD VENIPUNCTURE: CPT

## 2023-05-02 PROCEDURE — 85610 PROTHROMBIN TIME: CPT

## 2023-05-03 DIAGNOSIS — R16.0 LIVER MASS: ICD-10-CM

## 2023-05-03 DIAGNOSIS — C07 MALIGNANT NEOPLASM OF PAROTID GLAND: Primary | ICD-10-CM

## 2023-05-04 ENCOUNTER — TELEPHONE (OUTPATIENT)
Dept: PULMONOLOGY | Facility: CLINIC | Age: 88
End: 2023-05-04
Payer: MEDICARE

## 2023-05-04 ENCOUNTER — TELEPHONE (OUTPATIENT)
Dept: INTERVENTIONAL RADIOLOGY/VASCULAR | Facility: HOSPITAL | Age: 88
End: 2023-05-04

## 2023-05-04 NOTE — TELEPHONE ENCOUNTER
"I called Mr. Lundy and spoke to him about transportation options.  He was unable to come in this morning for his IR guided biopsy due to no transportation.  He stated he has no body to bring him but "if you can find someone I will be there"./tdr  "

## 2023-05-04 NOTE — TELEPHONE ENCOUNTER
I contacted [facility] in [place], [place] regarding transportation services that might be available for Mr. Lundy.  I left a voice mail with the transportation services [facility].  I will await their return call/tdr

## 2023-05-04 NOTE — TELEPHONE ENCOUNTER
Zoraida in [facility] states the patient does not have a  to come to do his Lung Biopsy Today in IR. Spoke to Alva and she states she will talk to Dr. Espinal.

## 2023-05-05 ENCOUNTER — TELEPHONE (OUTPATIENT)
Dept: PULMONOLOGY | Facility: CLINIC | Age: 88
End: 2023-05-05
Payer: MEDICARE

## 2023-05-05 NOTE — TELEPHONE ENCOUNTER
Good morning Jane,  I wanted to update you on the current situation with Mr. Lundy.  He was seen by Dr. Compa Garcia in lung mass clinic and referred to IR for biopsy of the 1.9cm spiculated RUL nodule.  He was scheduled for the FNB by Dr. Hatch on 5/4/2023, yesterday.  Unfortunately, he has no transportation and told me he has no one to bring him.  I am working with the [facility] to hopefully arrange transportation.  I wanted you and your team to be aware of what's going on at this time.  I will keep you posted on any new developments.  Thanks,  Alva

## 2023-05-10 ENCOUNTER — OFFICE VISIT (OUTPATIENT)
Dept: OTOLARYNGOLOGY | Facility: CLINIC | Age: 88
End: 2023-05-10
Payer: MEDICARE

## 2023-05-10 ENCOUNTER — TELEPHONE (OUTPATIENT)
Dept: OTOLARYNGOLOGY | Facility: CLINIC | Age: 88
End: 2023-05-10

## 2023-05-10 VITALS
HEIGHT: 72 IN | RESPIRATION RATE: 20 BRPM | WEIGHT: 164 LBS | SYSTOLIC BLOOD PRESSURE: 111 MMHG | OXYGEN SATURATION: 97 % | BODY MASS INDEX: 22.21 KG/M2 | DIASTOLIC BLOOD PRESSURE: 67 MMHG | TEMPERATURE: 98 F | HEART RATE: 69 BPM

## 2023-05-10 DIAGNOSIS — R91.8 LUNG NODULE, MULTIPLE: ICD-10-CM

## 2023-05-10 DIAGNOSIS — C07 CARCINOMA OF PAROTID GLAND: ICD-10-CM

## 2023-05-10 DIAGNOSIS — C79.2 CARCINOMATOUS METASTASIS IN SKIN: ICD-10-CM

## 2023-05-10 DIAGNOSIS — C44.329 SQUAMOUS CELL CARCINOMA OF SKIN OF CHEEK: Primary | ICD-10-CM

## 2023-05-10 PROCEDURE — 99215 OFFICE O/P EST HI 40 MIN: CPT | Mod: PBBFAC | Performed by: STUDENT IN AN ORGANIZED HEALTH CARE EDUCATION/TRAINING PROGRAM

## 2023-05-10 RX ORDER — CETIRIZINE HYDROCHLORIDE 10 MG/1
10 TABLET ORAL 2 TIMES DAILY PRN
Status: ON HOLD | COMMUNITY
End: 2023-07-10 | Stop reason: HOSPADM
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
## 2023-05-17 ENCOUNTER — DOCUMENTATION ONLY (OUTPATIENT)
Dept: OTOLARYNGOLOGY | Facility: CLINIC | Age: 88
End: 2023-05-17
Payer: MEDICARE

## 2023-05-17 NOTE — PROGRESS NOTES
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 Now with left parotid mass identified on 1/25/23. FNA shows poorly differentiated carcinoma. Unfortunately, workup has been delayed as patient has missed several appointments due to transportation issues, living alone, and having phone that does not always work. Pt is never smoker.   PET 1/25/23: 1.9 x 2 cm left parotid mass (SUV 12.8). Right thyroid nodule, subsequently biopsied and benign. Bilateral spiculated upper lobe lung nodules (right 1.9cm and 4.8 SUV, left 0.9cm and 2.9 SUV).   CT Neck 3/10/23: Left parotid mass, 1.8 x 2.5cm. No cervical lymphadenopathy.   CT Chest 3/10/23: Upper lobes as above. Also, 0.9 x 0.9 cm lingula nodule. Scattered punctate lung nodules, 1-2 mm, right lower lobe. Findings consistent with metastatic disease.   Pulm 4/17/23: IR biopsy  lung nodules scheduled for 4/5/23, but patient no showed. Now scheduled for 5/25/23.  Exam 5/10/23: 3.5cm firm mass involving the left tail of parotid, appears to be fixed to the adjacent SCM/masseter as well as the overlying skin.  3 new subcentimeter nodules fixed to the skin in the left postauricular skin. Hypopigmented skin lesion inferior to the mass. HB 1/6        TUMOR BOARD RECOMMENDATIONS:  We discussed the patient's difficult social situation (no social support, intermittently working car and telephone) that has made his cancer workup delayed. We discussed that the best treatment would be to start systemic therapy ASAP and would not  recommend surgery due to his likely lung metastasis. He has been referred to heme/onc. Will call to schedule an appt as quickly as possible for him.         CONSULT SERVICES NEEDED (If yes please select)  Surgery              Radiation Oncology          Medical Oncology         Radiology/Interventional Radiology       Palliative Care                    Psychiatry/Psychology         Genetics          Dietary             Is patient eligible for Clinical Trial?   If yes, name of clinical trial:        Disclaimer:  All available information was presented and reviewed by the multidisciplinary team.  Recommendations are based on the information available to the reviewing physicians at the time of this meeting.  As always, the final treatment decision is up to the treating physician and the patient.

## 2023-05-23 ENCOUNTER — TELEPHONE (OUTPATIENT)
Dept: PULMONOLOGY | Facility: CLINIC | Age: 88
End: 2023-05-23
Payer: MEDICARE

## 2023-05-24 ENCOUNTER — TELEPHONE (OUTPATIENT)
Dept: PULMONOLOGY | Facility: CLINIC | Age: 88
End: 2023-05-24
Payer: MEDICARE

## 2023-05-24 NOTE — TELEPHONE ENCOUNTER
I called and spoke with Ms. Paco RN, as well as [facility] and notified of plan to admit after IR guided biopsy./tdr

## 2023-05-25 ENCOUNTER — HOSPITAL ENCOUNTER (OUTPATIENT)
Dept: INTERVENTIONAL RADIOLOGY/VASCULAR | Facility: HOSPITAL | Age: 88
Discharge: HOME | End: 2023-05-25
Attending: HOSPITALIST | Admitting: FAMILY MEDICINE
Payer: MEDICARE

## 2023-05-25 ENCOUNTER — HOSPITAL ENCOUNTER (OUTPATIENT)
Facility: HOSPITAL | Age: 88
Discharge: HOME | End: 2023-05-26
Attending: FAMILY MEDICINE | Admitting: FAMILY MEDICINE
Payer: MEDICARE

## 2023-05-25 ENCOUNTER — HOSPITAL ENCOUNTER (OUTPATIENT)
Dept: RADIOLOGY | Facility: HOSPITAL | Age: 88
Discharge: HOME | End: 2023-05-25
Attending: HOSPITALIST | Admitting: FAMILY MEDICINE
Payer: MEDICARE

## 2023-05-25 VITALS — WEIGHT: 156.75 LBS | BODY MASS INDEX: 21.46 KG/M2

## 2023-05-25 DIAGNOSIS — C07 CANCER OF PAROTID GLAND: ICD-10-CM

## 2023-05-25 DIAGNOSIS — R91.8 LUNG MASS: ICD-10-CM

## 2023-05-25 DIAGNOSIS — C07 CARCINOMA OF PAROTID GLAND: ICD-10-CM

## 2023-05-25 DIAGNOSIS — R91.8 LUNG NODULE, MULTIPLE: Primary | ICD-10-CM

## 2023-05-25 DIAGNOSIS — R07.9 CHEST PAIN: ICD-10-CM

## 2023-05-25 PROCEDURE — 99152 MOD SED SAME PHYS/QHP 5/>YRS: CPT

## 2023-05-25 PROCEDURE — G0378 HOSPITAL OBSERVATION PER HR: HCPCS

## 2023-05-25 PROCEDURE — 71045 X-RAY EXAM CHEST 1 VIEW: CPT | Mod: TC

## 2023-05-25 PROCEDURE — 32408 CORE NDL BX LNG/MED PERQ: CPT

## 2023-05-25 PROCEDURE — 88341 IMHCHEM/IMCYTCHM EA ADD ANTB: CPT | Mod: TC | Performed by: HOSPITALIST

## 2023-05-25 PROCEDURE — 25000003 PHARM REV CODE 250: Performed by: STUDENT IN AN ORGANIZED HEALTH CARE EDUCATION/TRAINING PROGRAM

## 2023-05-25 PROCEDURE — G0379 DIRECT REFER HOSPITAL OBSERV: HCPCS

## 2023-05-25 PROCEDURE — 25000003 PHARM REV CODE 250: Performed by: RADIOLOGY

## 2023-05-25 RX ORDER — LIDOCAINE HYDROCHLORIDE 10 MG/ML
INJECTION INFILTRATION; PERINEURAL
Status: COMPLETED | OUTPATIENT
Start: 2023-05-25 | End: 2023-05-25

## 2023-05-25 RX ORDER — SODIUM CHLORIDE 0.9 % (FLUSH) 0.9 %
10 SYRINGE (ML) INJECTION EVERY 12 HOURS PRN
Status: DISCONTINUED | OUTPATIENT
Start: 2023-05-25 | End: 2023-05-26 | Stop reason: HOSPADM

## 2023-05-25 RX ORDER — SODIUM CHLORIDE 0.9 % (FLUSH) 0.9 %
10 SYRINGE (ML) INJECTION
Status: DISCONTINUED | OUTPATIENT
Start: 2023-05-25 | End: 2024-05-25 | Stop reason: HOSPADM

## 2023-05-25 RX ORDER — DEXTROSE 40 %
30 GEL (GRAM) ORAL
Status: DISCONTINUED | OUTPATIENT
Start: 2023-05-25 | End: 2023-05-26 | Stop reason: HOSPADM

## 2023-05-25 RX ORDER — TAMSULOSIN HYDROCHLORIDE 0.4 MG/1
1 CAPSULE ORAL NIGHTLY
Status: DISCONTINUED | OUTPATIENT
Start: 2023-05-25 | End: 2023-05-26 | Stop reason: HOSPADM

## 2023-05-25 RX ORDER — DEXTROSE 40 %
15 GEL (GRAM) ORAL
Status: DISCONTINUED | OUTPATIENT
Start: 2023-05-25 | End: 2023-05-26 | Stop reason: HOSPADM

## 2023-05-25 RX ORDER — GLUCAGON 1 MG
1 KIT INJECTION
Status: DISCONTINUED | OUTPATIENT
Start: 2023-05-25 | End: 2023-05-26 | Stop reason: HOSPADM

## 2023-05-25 RX ORDER — NALOXONE HCL 0.4 MG/ML
0.02 VIAL (ML) INJECTION
Status: DISCONTINUED | OUTPATIENT
Start: 2023-05-25 | End: 2023-05-26 | Stop reason: HOSPADM

## 2023-05-25 RX ORDER — ACETAMINOPHEN 325 MG/1
650 TABLET ORAL EVERY 6 HOURS PRN
Status: DISCONTINUED | OUTPATIENT
Start: 2023-05-25 | End: 2023-05-26 | Stop reason: HOSPADM

## 2023-05-25 RX ADMIN — LIDOCAINE HYDROCHLORIDE 6 ML: 10 INJECTION, SOLUTION INFILTRATION; PERINEURAL at 10:05

## 2023-05-25 RX ADMIN — TAMSULOSIN HYDROCHLORIDE 0.4 MG: 0.4 CAPSULE ORAL at 08:05

## 2023-05-25 NOTE — H&P
Radiology History & Physical      SUBJECTIVE:     Chief Complaint: Lung Mass    History of Present Illness:  Erik Lundy is a 87 y.o. male who presents for Biopsy  Past Medical History:   Diagnosis Date    Skin cancer      Past Surgical History:   Procedure Laterality Date    BIOPSY OF THYROID Right        Home Meds:   Prior to Admission medications    Medication Sig Start Date End Date Taking? Authorizing Provider   trimethoprim (TRIMPEX) 100 mg Tab Take 100 mg by mouth once daily. 10/26/22  Yes Historical Provider   cetirizine (ZYRTEC) 10 MG tablet Take 10 mg by mouth 2 (two) times daily as needed for Allergies.    Historical Provider   tamsulosin (FLOMAX) 0.4 mg Cap Take 1 capsule by mouth every evening. 10/26/22   Historical Provider     Anticoagulants/Antiplatelets: no anticoagulation    Allergies: Review of patient's allergies indicates:  No Known Allergies  Sedation History:  no adverse reactions    Review of Systems:   Hematological: no known coagulopathies  Respiratory: no shortness of breath  Cardiovascular: no chest pain  Gastrointestinal: no abdominal pain  Genito-Urinary: no dysuria  Musculoskeletal: negative  Neurological: no TIA or stroke symptoms         OBJECTIVE:     Vital Signs (Most Recent)  Temp: 97.7 °F (36.5 °C) (05/25/23 0618)  Pulse: 66 (05/25/23 1047)  Resp: 16 (05/25/23 1047)  BP: (!) 164/80 (05/25/23 1047)  SpO2: 100 % (05/25/23 1047)    Physical Exam:  ASA: 2    General: no acute distress  Mental Status: alert and oriented to person, place and time  HEENT: normocephalic, atraumatic  Chest: unlabored breathing  Heart: regular heart rate  Abdomen: nondistended  Extremity: moves all extremities    Laboratory  Lab Results   Component Value Date    INR 1.10 05/02/2023       Lab Results   Component Value Date    WBC 6.46 05/02/2023    HGB 13.8 (L) 05/02/2023    HCT 42.1 05/02/2023    MCV 94.4 (H) 05/02/2023     05/02/2023      Lab Results   Component Value Date     05/02/2023     K 4.6 05/02/2023    CO2 29 05/02/2023    BUN 18.7 05/02/2023    CREATININE 1.78 (H) 05/02/2023    CALCIUM 9.6 05/02/2023    ALT 11 05/02/2023    AST 17 05/02/2023    ALBUMIN 4.1 05/02/2023    BILITOT 0.6 05/02/2023    BILIDIR 0.10 01/20/2020       ASSESSMENT/PLAN:     Sedation Plan: Moderation  Patient will undergo Liver Biopsy.    Edgar Hatch MD

## 2023-05-25 NOTE — DISCHARGE SUMMARY
Radiology Post-Procedure Note    Pre Op Diagnosis: Right lung mass    Post Op Diagnosis: Right lung mass    Procedure: right lung biopsy    Procedure performed by: Edgar Hatch M.D.    Written Informed Consent Obtained: Yes    Specimen Removed: YES     Estimated Blood Loss: Minimal    Findings:   Standard CT Lung Biopsy    Patient tolerated procedure well.    Edgar Hatch MD

## 2023-05-25 NOTE — PLAN OF CARE
TRANSITION OF CARE PROGRESS NOTE    I have received checkout from Dr. Avalos regarding this patient.     HO 1 assessment:  Admission diagnosis: R Lung mass  Overnight management: s/p IR guided lung biopsy this AM. Observation overnight due to no transportation. CXR if develops shortness of breath.   Code status: Full    Please call Dr. Avalos at [phone] from 05/25/2023 4PM to 5/26/23 7AM if any issues arise.    Rupinder Cope  [facility] HO-1

## 2023-05-25 NOTE — H&P
[facility] Family Medicine History & Physical Note     Attending Physician: Ernestina Horton MD  Resident: Mihaela Dick MD      Chief Complaint     Status post IR guided lung biopsy     Subjective:      History of Present Illness:    88 yo male with sig pmh BPH, metastatic SCC of left cheek s/p Mohs in 2016, poorly differentiated carcinoma of Left parotid mass s/p FNA 01/19/23, bilateral pulmonary nodules, benign thyroid nodules presents to [facility] for admission post IR guided lung biopsy.  Patient examined after lung biopsy.  Denies any pain, sob, headache, chest pain, abdominal pain, n/v.  Had one episode of a cough with blood tinged sputum post biopsy that has subsided.        Past Medical History:  Past Medical History:   Diagnosis Date    Skin cancer        Past Surgical History:  Past Surgical History:   Procedure Laterality Date    BIOPSY OF THYROID Right        Family History:  No family history on file.    Social History:  Social History     Tobacco Use    Smoking status: Never     Passive exposure: Never    Smokeless tobacco: Never   Substance Use Topics    Alcohol use: Yes     Alcohol/week: 3.0 standard drinks     Types: 3 Cans of beer per week     Comment: 3 cans of beer on the weekend    Drug use: Never       Allergies:  Review of patient's allergies indicates:  No Known Allergies    Home Medications:  Prior to Admission medications    Medication Sig Start Date End Date Taking? Authorizing Provider   cetirizine (ZYRTEC) 10 MG tablet Take 10 mg by mouth 2 (two) times daily as needed for Allergies.    Historical Provider   tamsulosin (FLOMAX) 0.4 mg Cap Take 1 capsule by mouth every evening. 10/26/22   Historical Provider   trimethoprim (TRIMPEX) 100 mg Tab Take 100 mg by mouth once daily. 10/26/22   Historical Provider         Review of Systems:  CONSTITUTIONAL: No weight loss, fever, chills, or weakness.    HEENT: Eyes: No visual loss or blurred vision.   Ears, Nose, Throat: No congestion, runny nose or  sore throat.    CARDIOVASCULAR: No chest pain, chest pressure, or chest discomfort. No palpitations.    RESPIRATORY: No shortness of breath, cough, or sputum.    GASTROINTESTINAL: No nausea, vomiting or diarrhea. No abdominal pain  GENITOURINARY: No pain, burning, or increased frequency or urgency on urination.   NEUROLOGICAL: No headache, dizziness, numbness, or tingling in the extremities.   MUSCULOSKELETAL: No muscle, back pain, joint pain, or stiffness.       Objective:   Last 24 Hour Vital Signs:  BP  Min: 149/91  Max: 169/94  Temp  Av.7 °F (36.5 °C)  Min: 97.7 °F (36.5 °C)  Max: 97.7 °F (36.5 °C)  Pulse  Av.6  Min: 57  Max: 67  Resp  Av.5  Min: 16  Max: 19  SpO2  Av.3 %  Min: 96 %  Max: 100 %  Weight  Av.3 kg (159 lb 6.4 oz)  Min: 72.3 kg (159 lb 6.4 oz)  Max: 72.3 kg (159 lb 6.4 oz)  There is no height or weight on file to calculate BMI.  No intake/output data recorded.    Physical Examination:  General: appears well, in no acute distress   Eye: no scleral icterus   HENT: MMM  Respiratory: clear to auscultation bilaterally, nonlabored respirations, bandage over biopsy site clean and dry.    Cardiovascular: regular rate and rhythm without murmurs or gallops, no edema in bilateral lower extremities   Gastrointestinal: soft, non-tender, non-distended, bowel sounds present   Genitourinary: no suprapubic tenderness   Musculoskeletal: no gross deformities observed   Neuro: No focal lesions observed       Laboratory:  Most Recent Data:  CBC:   Lab Results   Component Value Date    WBC 6.46 2023    HGB 13.8 (L) 2023    HCT 42.1 2023     2023    MCV 94.4 (H) 2023    RDW 13.1 2023     WBC Differential: No results for input(s): WBC, HGB, HCT, PLT, MCV in the last 168 hours.  BMP:   Lab Results   Component Value Date     2023    K 4.6 2023    CO2 29 2023    BUN 18.7 2023    CREATININE 1.78 (H) 2023    CALCIUM 9.6  05/02/2023     LFTs:   Lab Results   Component Value Date    ALBUMIN 4.1 05/02/2023    BILITOT 0.6 05/02/2023    AST 17 05/02/2023    ALKPHOS 105 05/02/2023    ALT 11 05/02/2023     Coags:   Lab Results   Component Value Date    INR 1.10 05/02/2023    PROTIME 14.0 05/02/2023     FLP: No results found for: CHOL, HDL, LDLCALC, TRIG, CHOLHDL  DM:   Lab Results   Component Value Date    CREATININE 1.78 (H) 05/02/2023         Assessment & Plan:     R Lung mass  Poorly differentiated carcinoma of Left parotid mass   BPH  -s/p IR guided lung biopsy this AM.  Patient does not have any family/friends to drive him home (hospital policy) therefore he will be admitted for the night for observation  -pending post op CXR  -CT chest on 3/10 is reflective of PET scan done on 01/25/2023; otherwise revealed scatted punctate 1-2 mm nodules in the Right lower lobe, findings consistent w/metastatic disease per radiology  -continue home flomax 0.4 mg qhs      CODE STATUS: full  Access: PIV  Antibiotics: none  Diet: full  DVT Prophylaxis: SCDs  GI Prophylaxis: none  Fluids: none    Mihaela Dick MD  [facility] HO-3

## 2023-05-26 VITALS
DIASTOLIC BLOOD PRESSURE: 69 MMHG | OXYGEN SATURATION: 97 % | WEIGHT: 159.38 LBS | HEART RATE: 67 BPM | RESPIRATION RATE: 16 BRPM | SYSTOLIC BLOOD PRESSURE: 115 MMHG | BODY MASS INDEX: 21.83 KG/M2 | TEMPERATURE: 98 F

## 2023-05-26 PROBLEM — E44.0 MODERATE MALNUTRITION: Status: ACTIVE | Noted: 2023-05-26

## 2023-05-26 PROCEDURE — G0378 HOSPITAL OBSERVATION PER HR: HCPCS

## 2023-05-26 RX ORDER — TAMSULOSIN HYDROCHLORIDE 0.4 MG/1
1 CAPSULE ORAL NIGHTLY
Qty: 30 CAPSULE | Refills: 2 | Status: SHIPPED | OUTPATIENT
Start: 2023-05-26

## 2023-05-26 NOTE — ASSESSMENT & PLAN NOTE
"Patient meets ASPEN criteria for moderate malnutrition of chronic illness per RD assessment as evidenced by:  Energy Intake (Malnutrition): other (see comments) (Does not meet criteria)  Weight Loss (Malnutrition): 5% in 1 month  Subcutaneous Fat (Malnutrition): mild depletion  Muscle Mass (Malnutrition): mild depletion  Fluid Accumulation (Malnutrition): other (see comments) (Does not meet criteria)        A minimum of two characteristics is recommended for diagnosis of either severe or non-severe malnutrition.    Ht Readings from Last 1 Encounters:   05/10/23 5' 11.65" (1.82 m)     Wt Readings from Last 1 Encounters:   05/25/23 1619 71.1 kg (156 lb 12 oz)     Body mass index is 21.46 kg/m².    Patient has been screened and assessed by RD. See nutrition recommendations documented in inpatient nutrition assessment. RD will continue to follow patient throughout hospitalization.    "

## 2023-05-26 NOTE — PROGRESS NOTES
Inpatient Nutrition Assessment    Admit Date: 5/25/2023   Total duration of encounter: 1 day     Nutrition Recommendation/Prescription     Continue Regular diet as tolerated  Consider daily MVI  Monitor po intake, wt, labs    Communication of Recommendations: reviewed with patient    Nutrition Assessment     Malnutrition Assessment/Nutrition-Focused Physical Exam    Malnutrition Context: chronic illness  Malnutrition Level: moderate  Energy Intake (Malnutrition): other (see comments) (Does not meet criteria)  Weight Loss (Malnutrition): 5% in 1 month  Subcutaneous Fat (Malnutrition): mild depletion  Orbital Region (Subcutaneous Fat Loss): mild depletion        Muscle Mass (Malnutrition): mild depletion  Fle[place] Region (Muscle Loss): mild depletion           Dorsal Hand (Muscle Loss): mild depletion           Fluid Accumulation (Malnutrition): other (see comments) (Does not meet criteria)        A minimum of two characteristics is recommended for diagnosis of either severe or non-severe malnutrition.    Chart Review    Reason Seen: continuous nutrition monitoring and malnutrition screening tool (MST)    Malnutrition Screening Tool Results   Have you recently lost weight without trying?: Unsure  Have you been eating poorly because of a decreased appetite?: No   MST Score: 2     Diagnosis: R Lung mass  Poorly differentiated carcinoma of Left parotid mass   BPH    Relevant Medical History: BPH, metastatic SCC of left cheek s/p Mohs in 2016, poorly differentiated carcinoma of Left parotid mass s/p FNA 01/19/23, bilateral pulmonary nodules, benign thyroid nodules    Nutrition-Related Medications: No nutrition related meds noted  Calorie Containing IV Medications: no significant kcals from medications at this time    Nutrition-Related Labs: No recent labs noted      Diet/PN Order: Diet Adult Regular  Oral Supplement Order: none  Tube Feeding Order: none  Appetite/Oral Intake: good/% of meals  Factors Affecting  "Nutritional Intake: constipation  Food/Denominational/Cultural Preferences: none reported  Food Allergies: no known food allergies       Wound(s):   None noted    Comments  : Pt reports good po intake of meals, consuming >75%. Pt reports some possible constipation; states usually take laxatives to resolve. Pt reports good appetite pta. Pt reports some wt loss; states #, unsure when last weighed. Noted ~5% wt loss x 1 month per EMR wt hx; muscle/fat loss noted. Noted pt due for d/c home today.      Anthropometrics       Last Height: 182 cm (72")  Last Weight: 71.1 kg (156 lb 12 oz) (23 1619)     BMI: 21.46  BMI Classification: underweight (BMI less than 22 if >65 years of age)                                Usual Body Weight (UBW), k.55 kg (#; unsure when last weighed)  % Usual Body Weight: 89.56     Usual Weight Provided By: patient and EMR weight history    Wt Readings from Last 5 Encounters:   23 71.1 kg (156 lb 12 oz)   05/10/23 74.4 kg (164 lb)   23 74.6 kg (164 lb 6.4 oz)   23 73.4 kg (161 lb 12.8 oz)   23 72.7 kg (160 lb 3.2 oz)     Weight Change(s) Since Admission:  Admit Weight: 71.1 kg (156 lb 12 oz) (23 1619)  : # per pt; unsure when last weighed; 5% wt loss x 1 month noted per EMR wt hx    Estimated Needs    Weight Used For Calorie Calculations: 71.1 kg (156 lb 12 oz)  Energy Calorie Requirements (kcal): 3538-3947 kcals (25-28 kcal/kg)  Energy Need Method: Kcal/kg  Weight Used For Protein Calculations: 71.1 kg (156 lb 12 oz)  Protein Requirements: 86g (1.2g/kg)  Fluid Requirements (mL): 1777-1990mL (1mL/kcal)  Temp (24hrs), Av.7 °F (36.5 °C), Min:97.5 °F (36.4 °C), Max:98 °F (36.7 °C)       Enteral Nutrition    Patient not receiving enteral nutrition at this time.    Parenteral Nutrition    Patient not receiving parenteral nutrition support at this time.    Evaluation of Received Nutrient Intake    Calories: meeting estimated " needs  Protein: meeting estimated needs    Patient Education    Not applicable.    Nutrition Diagnosis     PES: Malnutrition related to chronic illness as evidenced by 5% wt loss x 1 month; muscle/fat loss. (new)    Interventions/Goals     Intervention(s): general/healthful diet, multivitamin/mineral supplement therapy, and collaboration with other providers  Goal: Meet greater than 75% of nutritional needs by follow-up. (new)    Monitoring & Evaluation     Dietitian will monitor food and beverage intake and weight.  Nutrition Risk/Follow-Up: moderate (follow-up in 3-5 days)   Please consult if re-assessment needed sooner.

## 2023-05-26 NOTE — HOSPITAL COURSE
88 yo male with sig pmh BPH, metastatic SCC of left cheek s/p Mohs in 2016, poorly differentiated carcinoma of Left parotid mass s/p FNA 01/19/23, bilateral pulmonary nodules, benign thyroid nodules presents to [facility] for admission post IR guided lung biopsy.  Patient examined after lung biopsy.  Denies any pain, sob, headache, chest pain, abdominal pain, n/v.  Had one episode of a cough with blood tinged sputum post biopsy that has subsided.      Overnight patients vitals remained stable.  He had no complaints of chest pain or sob.  No pain from procedure.  Tolerating diet.  Ready to go home.        Physical Exam:   General: appears well, in no acute distress   Eye: no scleral icterus   HENT: MMM  Respiratory: clear to auscultation bilaterally, nonlabored respirations   Cardiovascular: regular rate and rhythm without murmurs or gallops, no edema in bilateral lower extremities   Gastrointestinal: soft, non-tender, non-distended, bowel sounds present   Genitourinary: no suprapubic tenderness   Musculoskeletal: no gross deformities observed

## 2023-05-29 LAB
DHEA SERPL-MCNC: NORMAL
ESTROGEN SERPL-MCNC: NORMAL PG/ML
INSULIN SERPL-ACNC: NORMAL U[IU]/ML
LAB AP CLINICAL INFORMATION: NORMAL
LAB AP GROSS DESCRIPTION: NORMAL
LAB AP INTRA OP: NORMAL
LAB AP REPORT FOOTNOTES: NORMAL
T3RU NFR SERPL: NORMAL %

## 2023-05-30 ENCOUNTER — TELEPHONE (OUTPATIENT)
Dept: PULMONOLOGY | Facility: CLINIC | Age: 88
End: 2023-05-30
Payer: MEDICARE

## 2023-05-30 NOTE — TELEPHONE ENCOUNTER
Attempted to call patient today with the results of his CT-guided needle biopsy from 05/25.  There was no answer and his mailbox was full.  [facility].

## 2023-05-31 ENCOUNTER — OFFICE VISIT (OUTPATIENT)
Dept: OTOLARYNGOLOGY | Facility: CLINIC | Age: 88
End: 2023-05-31
Payer: MEDICARE

## 2023-05-31 VITALS — BODY MASS INDEX: 22.21 KG/M2 | WEIGHT: 162.19 LBS

## 2023-05-31 DIAGNOSIS — C44.329 SQUAMOUS CELL CARCINOMA OF SKIN OF CHEEK: Primary | ICD-10-CM

## 2023-05-31 DIAGNOSIS — C07 CARCINOMA OF PAROTID GLAND: ICD-10-CM

## 2023-05-31 DIAGNOSIS — R91.8 LUNG NODULES: ICD-10-CM

## 2023-05-31 PROCEDURE — 99212 OFFICE O/P EST SF 10 MIN: CPT | Mod: PBBFAC | Performed by: STUDENT IN AN ORGANIZED HEALTH CARE EDUCATION/TRAINING PROGRAM
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
## 2023-06-01 ENCOUNTER — TELEPHONE (OUTPATIENT)
Dept: PULMONOLOGY | Facility: CLINIC | Age: 88
End: 2023-06-01
Payer: MEDICARE

## 2023-06-01 NOTE — TELEPHONE ENCOUNTER
I have tried call patient 4 times over the last 3 days with the results of his CT needle biopsy ([phone]).  No answer each time. His mailbox is full so I could leave no message.

## 2023-06-08 ENCOUNTER — TELEPHONE (OUTPATIENT)
Dept: HEMATOLOGY/ONCOLOGY | Facility: CLINIC | Age: 88
End: 2023-06-08
Payer: MEDICARE

## 2023-06-08 NOTE — NURSING
Attempted phone contact to confirm appointment, unsuccessful. Left voice message and provided RN Johnnie contact for call back.

## 2023-06-10 PROBLEM — Z85.828 HISTORY OF SQUAMOUS CELL CARCINOMA OF SKIN: Status: ACTIVE | Noted: 2023-06-10

## 2023-06-10 PROBLEM — C78.01 SECONDARY MALIGNANCY OF RIGHT LUNG: Status: ACTIVE | Noted: 2023-06-10

## 2023-06-10 PROBLEM — E04.1 THYROID NODULE: Status: ACTIVE | Noted: 2023-06-10

## 2023-06-10 PROBLEM — C07: Status: ACTIVE | Noted: 2023-06-10

## 2023-06-10 PROBLEM — C78.01: Status: ACTIVE | Noted: 2023-06-10

## 2023-06-10 NOTE — PROGRESS NOTES
History:  Past Medical History:   Diagnosis Date    Skin cancer    Social history:  Single.  No children.  Lives in [place], [place].  Drinks 4 cans of beer over the weekend.  No tobacco, alcohol, or illicit drug abuse. He unfortunately has several social factors that may affect his care including the fact that he is of older age, lives alone, can not always hear his phone, and has no surroudning family or friends to help. He has a car that he can drive, but has no one to drive him to and from appointments where he may need sedation.    Family history:  Mother experience some kind of skin cancer in her 90s.  Several uncles had unknown kind of cancer.    Health maintenance:  Primary care provider in [place], [place].  Past Surgical History:   Procedure Laterality Date    BIOPSY OF THYROID Right       Social History     Socioeconomic History    Marital status: Single   Tobacco Use    Smoking status: Never     Passive exposure: Never    Smokeless tobacco: Never   Substance and Sexual Activity    Alcohol use: Yes     Alcohol/week: 3.0 standard drinks     Types: 3 Cans of beer per week     Comment: 3 cans of beer on the weekend    Drug use: Never      History reviewed. No pertinent family history.     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, S/P WLE in 2016, S/P Mohs resection  -benign thyroid nodule right thyroid lobe      History of Present Illness:   Anemia        Oncologic/Hematologic History:  Oncology History   Adenocarcinoma of parotid gland   5/25/2023 Cancer Staged    Staging form: Major Salivary Glands, AJCC 8th Edition  - Clinical stage from 5/25/2023: Stage IVC (cT4a, cN0, pM1)     6/10/2023  Initial Diagnosis    Adenocarcinoma of parotid gland     6/16/2023 -  Chemotherapy    Treatment Summary   Plan Name: OP PACLITAXEL CARBOPLATIN (AUC 5) Q3W  Treatment Goal: Palliative  Status: Active  Start Date: 6/16/2023 (Planned)  End Date: 5/17/2024 (Planned)  Provider: Tony Kong MD  Chemotherapy: CARBOplatin (PARAPLATIN) in sodium chloride 0.9% 250 mL chemo infusion,  (original dose ), Intravenous, Clinic/HOD 1 time, 0 of 17 cycles  Dose modification:   (Cycle 1)  PACLitaxeL (TAXOL) in sodium chloride 0.9% 500 mL chemo infusion, 175 mg/m2, Intravenous, Clinic/HOD 1 time, 0 of 17 cycles     87-year-old gentleman, referred from [facility], with metastatic adenocarcinoma of parotid gland, with lung metastases (proven with biopsy).      Erik Lundy is a 87 y.o. 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 Esquivel with Mohs resection.  He was recently referred to [facility] for evaluation of a left neck mass, present for a month.    FNA of left parotid mass resulted in poorly-differentiated carcinoma  Reported 7 lb weight loss over 2 months  Denied dysphagia, odynophagia, dysphonia, dyspnea, hematemesis, hemoptysis, focal weakness, new skin lesions or additional masses in head or neck area.    Physical exam by ENT on 05/31/2023: 3.5 cm firm mass involving left tail of parotid, fixed to the adjacent sternocleidomastoid muscle/masseter muscle as well as overlying skin; 3 new subcentimeter nodules fixed to the skin in the left postauricular skin just superior to this area; also a hypopigmented skin lesion inferior to the mass; no palpable lymphadenopathy; nontender; no thyroid nodules or goiter  Developed some numbness in the periauricular area on the left for 2-3 weeks    Imaging studies/biopsy reports as below:      01/19/2023:  FNA without imaging, left superior level 5:   -pos for malignant cells; poorly-differentiated carcinoma (consistent with a  poorly-differentiated carcinoma in which a high-grade primary parotid malignancy or metastatic carcinoma is in the differential)    -01/24/2023:  CT neck without contrast (evaluation of mass left side of neck):  1.9 x 2.0 x 2.4 cm mass left parotid gland  Multiple thyroid nodules measuring up to 1.2 cm  Small nodules left upper lung lobe met, measuring up to 2 mm    02/16/2023: FDG PET-CT (evaluation of poorly-differentiated carcinoma involving left parotid gland) (comparison:  Outside CT neck 01/24/2023):  1. Hypermetabolic left parotid nodule (1.9 x 2 cm, maximum SUV 12.8)  2. Bilateral hypermetabolic upper lobe pulmonary nodules (spiculated right upper lung lobe nodule 1.9 cm, maximum SUV 4.8; left upper lobe lung nodule 9 mm, maximum SUV 2.9)  3. Suspected hypermetabolic right thyroid lobe nodule.    03/10/2023: Ultrasound thyroid (evaluation of nontoxic single thyroid nodule):   Mostly cystic nodules in the right and left alyson thyroid with exception of a single solid slightly hypoechoic nodule 14 x 9 x 11 mm corresponding to a TI-RADS type 4 nodule    03/10/2023: CT chest with contrast (PET pos lung nodules, concerning for metastases from left parotid gland malignancy) (comparison:  PET-CT 02/16/2023):  Bilateral lung nodules overall similar to the prior examination.  Findings are consistent with metastatic disease.  (Spiculated nodule right upper lung lobe, 1.9 x 1.6 cm; parenchymal nodularity left upper lobe; spiculated nodule lingula, 9 mm x 9 mm; new atelectasis right lower lung lobe; scattered punctate 1-2 mm nodules right lower lobe; no significant mediastinal lymphadenopathy)  Thyroid nodules    03/10/2023: CT temporal bone with contrast (evaluation of left parotid gland malignant neoplasm):  Unremarkable temporal bone CT    03/10/2023: CT soft tissue of the neck with contrast (evaluation of malignant neoplasm of parotid gland) (comparison:  PET-CT 02/16/2023):  2.5 cm left parotid tail mass; no cervical  lymphadenopathy (left parotid tail mass 1.8 x 2.5 cm, abuts the skin surface; no definitive evidence of perineural spread)    03/23/2023:  FNA right thyroid nodule:  -cytomorphology of a benign thyroid nodule    05/25/2023:  Right upper lung lobe nodule, CT-guided needle core biopsy:  -scant fragments of-scant fragment of adenocarcinoma (history of poorly-differentiated carcinoma of parotid gland is noted; a metastasis is favored)    06/12/2023:   Presents for initial medical oncology consultation.  Pleasant gentleman.  In no acute discomfort.    Very hard of hearing.    Overall, feels well.  ECOG 1.  Some generalized weakness.  Some trouble swallowing.    Has left parotid gland tumor; mildly to moderately painful.  No bleeding or ulceration.  No unusual headaches or focal neurological symptoms.       OP PACLITAXEL CARBOPLATIN (AUC 5) Q3W       Medications:  Current Outpatient Medications on File Prior to Visit   Medication Sig Dispense Refill    cetirizine (ZYRTEC) 10 MG tablet Take 10 mg by mouth 2 (two) times daily as needed for Allergies.      tamsulosin (FLOMAX) 0.4 mg Cap Take 1 capsule (0.4 mg total) by mouth every evening. 30 capsule 2    trimethoprim (TRIMPEX) 100 mg Tab Take 100 mg by mouth once daily.       Current Facility-Administered Medications on File Prior to Visit   Medication Dose Route Frequency Provider Last Rate Last Admin    sodium chloride 0.9% flush 10 mL  10 mL Intravenous PRN Compa Garcia MD           Review of Systems:   All systems reviewed and found to be negative except for the symptoms detailed above    Physical Examination:   VITAL SIGNS:   Vitals:    06/12/23 1012   BP: (!) 152/69   Pulse: 64   Resp: 20   Temp: 98.1 °F (36.7 °C)     GENERAL:  In no apparent distress.    HEAD:  No signs of head trauma.  EYES:  Pupils are equal.  Extraocular motions intact.    EARS:  Hearing grossly intact.  MOUTH:  Oropharynx is normal.   NECK:  No adenopathy, no JVD.     CHEST:  Chest with clear  breath sounds bilaterally.  No wheezes, rales, rhonchi.    CARDIAC:  Regular rate and rhythm.  S1 and S2, without murmurs, gallops, rubs.  VASCULAR:  No Edema.  Peripheral pulses normal and equal in all extremities.  ABDOMEN:  Soft, without detectable tenderness.  No sign of distention.  No   rebound or guarding, and no masses palpated.   Bowel Sounds normal.  MUSCULOSKELETAL:  Good range of motion of all major joints. Extremities withouta clubbing, cyanosis or edema.    NEUROLOGIC EXAM:  Alert and oriented x 3.  No focal sensory or strength deficits.   Speech normal.  Follows commands.  PSYCHIATRIC:  Mood normal.  06/12/2023: In no acute discomfort.  Left parotid gland tumor is noted, mildly tender, approximately 4 cm x 4 cm, with a dark spot at the inferior aspect, without superficial ulceration or bleeding.    No results for input(s): CBC in the last 72 hours.   No results for input(s): CMP in the last 72 hours.     Assessment:  Problem List Items Addressed This Visit          Pulmonary    Lung nodule, multiple    Relevant Orders    Magnesium    Comprehensive Metabolic Panel    CBC Auto Differential       Oncology    Carcinoma of parotid gland    Relevant Orders    Magnesium    Comprehensive Metabolic Panel    CBC Auto Differential    Carcinomatous metastasis in skin    Relevant Orders    Magnesium    Comprehensive Metabolic Panel    CBC Auto Differential    Adenocarcinoma of parotid gland    Relevant Orders    Magnesium    Comprehensive Metabolic Panel    CBC Auto Differential    Secondary malignancy of right lung    Relevant Orders    Magnesium    Comprehensive Metabolic Panel    CBC Auto Differential    Secondary adenocarcinoma of right lung    Relevant Orders    Magnesium    Comprehensive Metabolic Panel    CBC Auto Differential    History of squamous cell carcinoma of skin    Relevant Orders    Magnesium    Comprehensive Metabolic Panel    CBC Auto Differential       Endocrine    Thyroid nodule    Relevant  Orders    Magnesium    Comprehensive Metabolic Panel    CBC Auto Differential     Metastatic poorly-differentiated carcinoma/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:   -presentation:  Left neck mass for a month  -on physical exam by ENT 05/31/2023:  3.5 cm firm mass left tail of parotid, fixed to the adjacent sternocleidomastoid muscle/masseter muscle as well as overlying skin; 3 new subcentimeter nodules fixed to the skin in the left post auricular skin; hypopigmented skin lesion inferior to the mass as well  -FNA left superior level 5 mass 01/19/2023:  Poorly-differentiated carcinoma, consistent with a poorly-differentiated carcinoma in which a high-grade primary parotid malignancy or metastatic carcinoma is in the differential   -left parotid gland mass 1.9 x 2.0 x 2.4 cm on noncontrast CT neck 01/24/2023  -hypermetabolic left parotid nodule 1.9 x 2 cm on FDG PET-CT 02/16/2023  -2.5 cm left parotid tail mass on CT neck with contrast 03/10/2023, abutting the skin  -no cervical lymphadenopathy  ----  -small nodules left upper lung lobe, up to 2 mm, on noncontrast CT neck 01/24/2023  -bilateral hypermetabolic upper lobe lung nodules on FDG PET-CT 02/16/2023, largest nodule right upper lung lobe 1.9 cm  Bilateral lung nodules, similar, on contrast-enhanced chest CT 03/10/2023 (largest nodule right upper lung lobe, 1.9 cm)  -right upper lung lobe lung lobe nodule CT-guided needle core biopsy 05/25/2023:  Adenocarcinoma (a metastases from poorly-differentiated carcinoma of parotid gland, is favored)  ---  -by virtue of skin involvement, cT4a  -cN0  -lung metastases, therefore, M1  >> cT4 a cN0 M1, stage IVC      Past 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 Esquivel), with Mohs resection      Benign thyroid nodule:  -suspected hypermetabolic right thyroid nodule on FDG PET-CT 02/16/2023  -single solid 14 x 9 x 11 mm right thyroid nodule, TI-RADS 4, on  thyroid ultrasound 03/10/2023  -FNA right thyroid nodule 03/23/2023: Benign thyroid nodule         Plan:  Re-stage with MRI with contrast of skull-base to clavicle and CT chest with contrast    Has lung metastases, proven with biopsy  Needs palliative systemic therapy  Expectant management for slow-growing disease, is also an option    Systemic palliative chemotherapy options:  -Cisplatin/Navelbine  -cisplatin/doxorubicin/cyclophosphamide (category 2B)  -paclitaxel (category 2A for non adenoid cystic carcinoma)  -carboplatin/Taxol  -carboplatin/gemcitabine    Useful in certain circumstances:   -androgen receptor therapy for AR pos tumors: Leuprolide; bicalutamide  -NTRK therapy for NTRK gene fusion pos tumors: Larotrectinib; entrectinib  -HER2 targeted therapy for HER2 pos tumors: Trastuzumab; ado trastuzumab emtansine; trastuzumab/pertuzumab; docetaxel/trastuzumab; Fam-trastuzumab deruxtecan (category 2B)  -axitinib, sorafenib: Category 2B  -lenvatinib (category 2B) for adenoid cystic carcinoma  -pembrolizumab (for microsatellite instability-high, i.e., MSI-H, MMR deficient, i.e., dMMR, TMB-H (=/> 10 mut/Mb)   -dabrafenib/trametinib for BRAF V600 E pos tumors  -Selpercatinib for RET gene fusion pos tumors  >>>  Will order NGS testing on biopsy of parotid gland or lung metastasis (to include, androgen receptor, NTRK gene fusion, HER2, MSI/MMR, TMB, BRAF V600 E, RET gene fusion)    To start with, will treat with carboplatin/Taxol   Regimen:  -paclitaxel 175 mg per m2 IV over 3 hours on day 1, followed by  -carboplatin AUC 5.5 IV over 30 minutes on day 1  Repeat cycle every 21 days until disease progression or unacceptable toxicity  Or,   -paclitaxel 200 mg per m2 IV over 3 hours on day 1, followed by  -carboplatin AUC 6 IV over 30 minutes on day 1   Repeat cycle every 21 days until disease progression or unacceptable toxicity    During chemotherapy, check CBC and CMP weekly  Re-stage with imaging studies a couple of  months after starting chemotherapy    Potential side effects of chemotherapy discussed in detail.    Formal chemotherapy teaching with nursing staff to follow.    06/12/2023:   He is adamant to have parotid gland tumor surgically excised   Explained to him multiple X that with metastatic disease, this is not feasible   However, he is adamant to have the parotid gland tumor excised because, in his view, this is the one which is causing metastases and that it needs to be removed.    I advised him to follow-up with ENT to have concerns addressed once again.    Refer to surgery for MediPort placement.      Follow-up with NP within a week for chemotherapy teaching.    Follow-up visit with me in 3 weeks.    Above discussed at length with the patient.  All questions answered.    Discussed labs, scans, pathology report, and staging of parotid gland cancer, and gave him copies of relevant reports.    Discussed the following:  That he has stage IV disease which is incurable; long-term prognosis is guarded; palliation can be attempted with systemic therapy but response can not be guaranteed.    He understands and agrees with this plan.    Follow-up:  No follow-ups on file.  a

## 2023-06-12 ENCOUNTER — OFFICE VISIT (OUTPATIENT)
Dept: HEMATOLOGY/ONCOLOGY | Facility: CLINIC | Age: 88
End: 2023-06-12
Attending: INTERNAL MEDICINE
Payer: MEDICARE

## 2023-06-12 ENCOUNTER — DOCUMENTATION ONLY (OUTPATIENT)
Dept: HEMATOLOGY/ONCOLOGY | Facility: CLINIC | Age: 88
End: 2023-06-12

## 2023-06-12 VITALS
DIASTOLIC BLOOD PRESSURE: 69 MMHG | SYSTOLIC BLOOD PRESSURE: 152 MMHG | HEIGHT: 72 IN | TEMPERATURE: 98 F | BODY MASS INDEX: 22.35 KG/M2 | OXYGEN SATURATION: 97 % | WEIGHT: 165 LBS | HEART RATE: 64 BPM | RESPIRATION RATE: 20 BRPM

## 2023-06-12 DIAGNOSIS — C78.01 SECONDARY MALIGNANCY OF RIGHT LUNG: ICD-10-CM

## 2023-06-12 DIAGNOSIS — C79.2 CARCINOMATOUS METASTASIS IN SKIN: ICD-10-CM

## 2023-06-12 DIAGNOSIS — C44.329 SQUAMOUS CELL CARCINOMA OF SKIN OF CHEEK: ICD-10-CM

## 2023-06-12 DIAGNOSIS — C07 CARCINOMA OF PAROTID GLAND: ICD-10-CM

## 2023-06-12 DIAGNOSIS — E04.1 THYROID NODULE: ICD-10-CM

## 2023-06-12 DIAGNOSIS — C07 ADENOCARCINOMA OF PAROTID GLAND: Primary | ICD-10-CM

## 2023-06-12 DIAGNOSIS — R91.8 LUNG NODULE, MULTIPLE: ICD-10-CM

## 2023-06-12 DIAGNOSIS — C07 ADENOCARCINOMA OF PAROTID GLAND: ICD-10-CM

## 2023-06-12 DIAGNOSIS — C78.01 SECONDARY ADENOCARCINOMA OF RIGHT LUNG: ICD-10-CM

## 2023-06-12 DIAGNOSIS — C44.90 SKIN CANCER: ICD-10-CM

## 2023-06-12 DIAGNOSIS — Z85.828 HISTORY OF SQUAMOUS CELL CARCINOMA OF SKIN: ICD-10-CM

## 2023-06-12 LAB
ALBUMIN SERPL-MCNC: 4.2 G/DL (ref 3.4–4.8)
ALBUMIN/GLOB SERPL: 1.2 RATIO (ref 1.1–2)
ALP SERPL-CCNC: 97 UNIT/L (ref 40–150)
ALT SERPL-CCNC: 13 UNIT/L (ref 0–55)
AST SERPL-CCNC: 18 UNIT/L (ref 5–34)
BASOPHILS # BLD AUTO: 0.05 X10(3)/MCL
BASOPHILS NFR BLD AUTO: 0.8 %
BILIRUBIN DIRECT+TOT PNL SERPL-MCNC: 0.6 MG/DL
BUN SERPL-MCNC: 17.6 MG/DL (ref 8.4–25.7)
CALCIUM SERPL-MCNC: 9.5 MG/DL (ref 8.8–10)
CHLORIDE SERPL-SCNC: 104 MMOL/L (ref 98–107)
CO2 SERPL-SCNC: 26 MMOL/L (ref 23–31)
CREAT SERPL-MCNC: 1.83 MG/DL (ref 0.73–1.18)
EOSINOPHIL # BLD AUTO: 0.14 X10(3)/MCL (ref 0–0.9)
EOSINOPHIL NFR BLD AUTO: 2.2 %
ERYTHROCYTE [DISTWIDTH] IN BLOOD BY AUTOMATED COUNT: 13.2 % (ref 11.5–17)
GFR SERPLBLD CREATININE-BSD FMLA CKD-EPI: 35 MLS/MIN/1.73/M2
GLOBULIN SER-MCNC: 3.4 GM/DL (ref 2.4–3.5)
GLUCOSE SERPL-MCNC: 98 MG/DL (ref 82–115)
HCT VFR BLD AUTO: 40.4 % (ref 42–52)
HGB BLD-MCNC: 13.4 G/DL (ref 14–18)
IMM GRANULOCYTES # BLD AUTO: 0.02 X10(3)/MCL (ref 0–0.04)
IMM GRANULOCYTES NFR BLD AUTO: 0.3 %
LYMPHOCYTES # BLD AUTO: 1.67 X10(3)/MCL (ref 0.6–4.6)
LYMPHOCYTES NFR BLD AUTO: 26.6 %
MAGNESIUM SERPL-MCNC: 2.4 MG/DL (ref 1.6–2.6)
MCH RBC QN AUTO: 31.5 PG (ref 27–31)
MCHC RBC AUTO-ENTMCNC: 33.2 G/DL (ref 33–36)
MCV RBC AUTO: 95.1 FL (ref 80–94)
MONOCYTES # BLD AUTO: 0.53 X10(3)/MCL (ref 0.1–1.3)
MONOCYTES NFR BLD AUTO: 8.5 %
NEUTROPHILS # BLD AUTO: 3.86 X10(3)/MCL (ref 2.1–9.2)
NEUTROPHILS NFR BLD AUTO: 61.6 %
NRBC BLD AUTO-RTO: 0 %
PLATELET # BLD AUTO: 245 X10(3)/MCL (ref 130–400)
PMV BLD AUTO: 9.6 FL (ref 7.4–10.4)
POTASSIUM SERPL-SCNC: 4.5 MMOL/L (ref 3.5–5.1)
PROT SERPL-MCNC: 7.6 GM/DL (ref 5.8–7.6)
RBC # BLD AUTO: 4.25 X10(6)/MCL (ref 4.7–6.1)
SODIUM SERPL-SCNC: 140 MMOL/L (ref 136–145)
WBC # SPEC AUTO: 6.27 X10(3)/MCL (ref 4.5–11.5)

## 2023-06-12 PROCEDURE — 36415 COLL VENOUS BLD VENIPUNCTURE: CPT | Performed by: INTERNAL MEDICINE

## 2023-06-12 PROCEDURE — 99215 OFFICE O/P EST HI 40 MIN: CPT | Mod: PBBFAC | Performed by: INTERNAL MEDICINE

## 2023-06-12 PROCEDURE — 83735 ASSAY OF MAGNESIUM: CPT | Performed by: INTERNAL MEDICINE

## 2023-06-12 PROCEDURE — 85025 COMPLETE CBC W/AUTO DIFF WBC: CPT | Performed by: INTERNAL MEDICINE

## 2023-06-12 PROCEDURE — 99205 PR OFFICE/OUTPT VISIT, NEW, LEVL V, 60-74 MIN: ICD-10-PCS | Mod: S$PBB,,, | Performed by: INTERNAL MEDICINE

## 2023-06-12 PROCEDURE — 80053 COMPREHEN METABOLIC PANEL: CPT | Performed by: INTERNAL MEDICINE

## 2023-06-12 PROCEDURE — 99205 OFFICE O/P NEW HI 60 MIN: CPT | Mod: S$PBB,,, | Performed by: INTERNAL MEDICINE

## 2023-06-12 NOTE — Clinical Note
Orders for today:   At this time, re-stage with MRI scan with contrast skull-based to clavicle  Re-stage with contrast enhanced chest CT  Please order NGS testing on biopsy of parotid gland or lung metastases (to include androgen receptor, NTRK gene fusion, HER2, MSI/MMR, tumor mutational burden, BRAF V600 E, RET gene fusion, etc.)  (Make sure the specimen is also tested for androgen receptors)   Needs chemotherapy with carboplatin/Taxol every 3 weeks  Arrange for chemo teaching with nursing staff within a week During chemotherapy, check CBC and CMP weekly Re-stage with imaging studies a couple of months after starting chemotherapy  Refer to surgery for MediPort placement. Check CBC and CMP  Follow-up with NP within a week for chemotherapy teaching.   Follow-up visit with me in 3 weeks.

## 2023-06-19 ENCOUNTER — DOCUMENTATION ONLY (OUTPATIENT)
Dept: HEMATOLOGY/ONCOLOGY | Facility: CLINIC | Age: 88
End: 2023-06-19
Payer: MEDICARE

## 2023-06-19 NOTE — PROGRESS NOTES
Per Kristie Isbell M.D. on 6/19/2023 via email   We can approve x 2 cycles as limited metastatic options- reassessment for response following before further approved.

## 2023-06-20 ENCOUNTER — DOCUMENTATION ONLY (OUTPATIENT)
Dept: HEMATOLOGY/ONCOLOGY | Facility: CLINIC | Age: 88
End: 2023-06-20
Payer: MEDICARE

## 2023-06-20 NOTE — NURSING
Spoke with [facility] who reports that the only services they provide in [place], [place] is homemaker services and meals on wheels. Says they do not have any  that can check on patient and/or his living conditions.

## 2023-06-20 NOTE — NURSING
PADMINI Blair received request to assist with improving communication with patient. Patient is [place] and has difficulty answering his phone. Attempted to contact patient. Phone rings several times then goes to voice mail. Left message to return PADMINI Blair call. At previous clinic visit, patient told PADMINI Blair that he can't hear his phone ring. Discuss checking his voice messages frequently.    Contacted [facility] who reports that patient was discharged from their services on 5/14/2023. Discuss patient's case and resource options with GABI Yao. Patient is scheduled for mediport placement and chemo teaching on 6/27/23. Discussed referring to [facility] and home health services once begins treatment.

## 2023-06-27 ENCOUNTER — OFFICE VISIT (OUTPATIENT)
Dept: SURGERY | Facility: CLINIC | Age: 88
End: 2023-06-27
Attending: INTERNAL MEDICINE
Payer: MEDICARE

## 2023-06-27 ENCOUNTER — OFFICE VISIT (OUTPATIENT)
Dept: HEMATOLOGY/ONCOLOGY | Facility: CLINIC | Age: 88
End: 2023-06-27
Payer: MEDICARE

## 2023-06-27 VITALS
WEIGHT: 165 LBS | DIASTOLIC BLOOD PRESSURE: 70 MMHG | OXYGEN SATURATION: 97 % | HEIGHT: 72 IN | TEMPERATURE: 98 F | BODY MASS INDEX: 22.35 KG/M2 | RESPIRATION RATE: 20 BRPM | SYSTOLIC BLOOD PRESSURE: 133 MMHG | HEART RATE: 82 BPM

## 2023-06-27 VITALS
BODY MASS INDEX: 22.13 KG/M2 | WEIGHT: 163.38 LBS | HEIGHT: 72 IN | TEMPERATURE: 98 F | HEART RATE: 61 BPM | SYSTOLIC BLOOD PRESSURE: 132 MMHG | OXYGEN SATURATION: 97 % | DIASTOLIC BLOOD PRESSURE: 73 MMHG

## 2023-06-27 DIAGNOSIS — C07 ADENOCARCINOMA OF PAROTID GLAND: Primary | ICD-10-CM

## 2023-06-27 DIAGNOSIS — C07 ADENOCARCINOMA OF PAROTID GLAND: ICD-10-CM

## 2023-06-27 DIAGNOSIS — C78.01 SECONDARY ADENOCARCINOMA OF RIGHT LUNG: ICD-10-CM

## 2023-06-27 DIAGNOSIS — C78.01 SECONDARY MALIGNANCY OF RIGHT LUNG: ICD-10-CM

## 2023-06-27 PROCEDURE — 99213 OFFICE O/P EST LOW 20 MIN: CPT | Mod: PBBFAC,27 | Performed by: NURSE PRACTITIONER

## 2023-06-27 PROCEDURE — 99215 OFFICE O/P EST HI 40 MIN: CPT | Mod: PBBFAC,25

## 2023-06-27 PROCEDURE — 99215 PR OFFICE/OUTPT VISIT, EST, LEVL V, 40-54 MIN: ICD-10-PCS | Mod: S$PBB,,, | Performed by: NURSE PRACTITIONER

## 2023-06-27 PROCEDURE — 99215 OFFICE O/P EST HI 40 MIN: CPT | Mod: S$PBB,,, | Performed by: NURSE PRACTITIONER

## 2023-06-27 RX ORDER — CEFAZOLIN SODIUM 2 G/50ML
2 SOLUTION INTRAVENOUS
Status: CANCELLED | OUTPATIENT
Start: 2023-06-27

## 2023-06-27 RX ORDER — HEPARIN SODIUM 5000 [USP'U]/ML
5000 INJECTION, SOLUTION INTRAVENOUS; SUBCUTANEOUS EVERY 8 HOURS
Status: CANCELLED | OUTPATIENT
Start: 2023-06-27

## 2023-06-27 RX ORDER — DEXAMETHASONE 4 MG/1
TABLET ORAL
Qty: 24 TABLET | Refills: 1 | Status: SHIPPED | OUTPATIENT
Start: 2023-06-27

## 2023-06-27 RX ORDER — ONDANSETRON 2 MG/ML
4 INJECTION INTRAMUSCULAR; INTRAVENOUS EVERY 6 HOURS PRN
Status: CANCELLED | OUTPATIENT
Start: 2023-06-27

## 2023-06-27 RX ORDER — ONDANSETRON HYDROCHLORIDE 8 MG/1
8 TABLET, FILM COATED ORAL EVERY 8 HOURS PRN
Qty: 30 TABLET | Refills: 2 | Status: SHIPPED | OUTPATIENT
Start: 2023-06-27

## 2023-06-27 RX ORDER — SODIUM CHLORIDE 9 MG/ML
INJECTION, SOLUTION INTRAVENOUS CONTINUOUS
Status: CANCELLED | OUTPATIENT
Start: 2023-06-27

## 2023-06-27 NOTE — PROGRESS NOTES
[facility] General Surgery Clinic Note    HPI:   Mr. 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 The patient is not on blood thinners. His only report is mass behind left ear causing intermittent pain. He does not smoke. He drinks 3 drinks per week. He has never had vascular access in chest or neck.     PMH:   Past Medical History:   Diagnosis Date    Skin cancer       Meds:   Current Outpatient Medications:     cetirizine (ZYRTEC) 10 MG tablet, Take 10 mg by mouth 2 (two) times daily as needed for Allergies., Disp: , Rfl:     tamsulosin (FLOMAX) 0.4 mg Cap, Take 1 capsule (0.4 mg total) by mouth every evening., Disp: 30 capsule, Rfl: 2    trimethoprim (TRIMPEX) 100 mg Tab, Take 100 mg by mouth once daily., Disp: , Rfl:   No current facility-administered medications for this visit.    Facility-Administered Medications Ordered in Other Visits:     sodium chloride 0.9% flush 10 mL, 10 mL, Intravenous, PRN, Compa Garcia MD  Allergies: Review of patient's allergies indicates:  No Known Allergies  Social History:   Social History     Tobacco Use    Smoking status: Never     Passive exposure: Never    Smokeless tobacco: Never   Substance Use Topics    Alcohol use: Yes     Alcohol/week: 3.0 standard drinks     Types: 3 Cans of beer per week     Comment: 3 cans of beer on the weekend    Drug use: Never     Family History: History reviewed. No pertinent family history.  Surgical History:   Past Surgical History:   Procedure Laterality Date    BIOPSY OF THYROID Right      Review of Systems:  Negative except as stated in HPI    Objective:    Vitals:  Vitals:    06/27/23 0937   BP: 132/73   Pulse: 61   Temp: 97.6 °F (36.4 °C)        Physical Exam:  Gen: NAD  Neuro: awake, alert, answering questions appropriately  CV: RRR  Resp: non-labored breathing, BRIANNA  Abd: not  distended  Ext: moves all 4 spontaneously and purposefully  Skin: warm, well perfused    Assessment/Plan:  87-year-old male with adenocarcinoma of parotid gland presenting for evaluation for mediport placement.      - Pt is booked and consented for mediport placement on 7/10.   - R/B/A of surgery discussed. All questions answered.  - Preoperative ancef and heparin ordered  - Patient had many questions about treatment timing, radiation v. Surgery v. Chemotherapy. He is quite concerned about mass behind left ear. Messaged Saray Mcgovern NP to reach out to patient to answer these questions.   - NPO at midnight prior to surgery.    Zuleima Pereira MD   [facility] General Surgery, PGY-1  06/27/2023 11:09 AM

## 2023-06-27 NOTE — PROGRESS NOTES
Patient seen by Dr. MAXIMILIAN Pereira. Scheduled for mediport insertion on 7/10/23. One day surgery instructions given. Written and verbal discharge instructions given.

## 2023-06-27 NOTE — PROGRESS NOTES
History:  Past Medical History:   Diagnosis Date    Skin cancer    Social history:  Single.  No children.  Lives in [place], [place].  Drinks 4 cans of beer over the weekend.  No tobacco, alcohol, or illicit drug abuse. He unfortunately has several social factors that may affect his care including the fact that he is of older age, lives alone, can not always hear his phone, and has no surroudning family or friends to help. He has a car that he can drive, but has no one to drive him to and from appointments where he may need sedation.    Family history:  Mother experience some kind of skin cancer in her 90s.  Several uncles had unknown kind of cancer.    Health maintenance:  Primary care provider in [place], [place].  Past Surgical History:   Procedure Laterality Date    BIOPSY OF THYROID Right       Social History     Socioeconomic History    Marital status: Single   Tobacco Use    Smoking status: Never     Passive exposure: Never    Smokeless tobacco: Never   Substance and Sexual Activity    Alcohol use: Yes     Alcohol/week: 3.0 standard drinks     Types: 3 Cans of beer per week     Comment: 3 cans of beer on the weekend    Drug use: Never      No family history on file.     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    Oncologic/Hematologic History:  Oncology History   Adenocarcinoma of parotid gland   5/25/2023 Cancer Staged    Staging form: Major Salivary Glands, AJCC 8th Edition  - Clinical stage from 5/25/2023: Stage IVC (cT4a, cN0, pM1)     6/10/2023 Initial  Diagnosis    Adenocarcinoma of parotid gland     6/16/2023 -  Chemotherapy    Treatment Summary   Plan Name: OP PACLITAXEL CARBOPLATIN (AUC 5) Q3W  Treatment Goal: Palliative  Status: Active  Start Date: 6/16/2023 (Planned)  End Date: 5/17/2024 (Planned)  Provider: Tony Kong MD  Chemotherapy: CARBOplatin (PARAPLATIN) in sodium chloride 0.9% 250 mL chemo infusion,  (original dose ), Intravenous, Clinic/HOD 1 time, 0 of 17 cycles  Dose modification:   (Cycle 1)  PACLitaxeL (TAXOL) in sodium chloride 0.9% 500 mL chemo infusion, 175 mg/m2, Intravenous, Clinic/HOD 1 time, 0 of 17 cycles     87-year-old gentleman, referred from [facility], with metastatic adenocarcinoma of parotid gland, with lung metastases (proven with biopsy).      Erik Lundy is a 87 y.o. 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 Esquivel with Mohs resection.  He was recently referred to [facility] for evaluation of a left neck mass, present for a month.    FNA of left parotid mass resulted in poorly-differentiated carcinoma  Reported 7 lb weight loss over 2 months  Denied dysphagia, odynophagia, dysphonia, dyspnea, hematemesis, hemoptysis, focal weakness, new skin lesions or additional masses in head or neck area.    Physical exam by ENT on 05/31/2023: 3.5 cm firm mass involving left tail of parotid, fixed to the adjacent sternocleidomastoid muscle/masseter muscle as well as overlying skin; 3 new subcentimeter nodules fixed to the skin in the left postauricular skin just superior to this area; also a hypopigmented skin lesion inferior to the mass; no palpable lymphadenopathy; nontender; no thyroid nodules or goiter  Developed some numbness in the periauricular area on the left for 2-3 weeks    Imaging studies/biopsy reports as below:      01/19/2023:  FNA without imaging, left superior level 5:   -pos for malignant cells; poorly-differentiated carcinoma (consistent with a  poorly-differentiated carcinoma in which a high-grade primary parotid malignancy or metastatic carcinoma is in the differential)    -01/24/2023:  CT neck without contrast (evaluation of mass left side of neck):  1.9 x 2.0 x 2.4 cm mass left parotid gland  Multiple thyroid nodules measuring up to 1.2 cm  Small nodules left upper lung lobe met, measuring up to 2 mm    02/16/2023: FDG PET-CT (evaluation of poorly-differentiated carcinoma involving left parotid gland) (comparison:  Outside CT neck 01/24/2023):  1. Hypermetabolic left parotid nodule (1.9 x 2 cm, maximum SUV 12.8)  2. Bilateral hypermetabolic upper lobe pulmonary nodules (spiculated right upper lung lobe nodule 1.9 cm, maximum SUV 4.8; left upper lobe lung nodule 9 mm, maximum SUV 2.9)  3. Suspected hypermetabolic right thyroid lobe nodule.    03/10/2023: Ultrasound thyroid (evaluation of nontoxic single thyroid nodule):   Mostly cystic nodules in the right and left alyson thyroid with exception of a single solid slightly hypoechoic nodule 14 x 9 x 11 mm corresponding to a TI-RADS type 4 nodule    03/10/2023: CT chest with contrast (PET pos lung nodules, concerning for metastases from left parotid gland malignancy) (comparison:  PET-CT 02/16/2023):  Bilateral lung nodules overall similar to the prior examination.  Findings are consistent with metastatic disease.  (Spiculated nodule right upper lung lobe, 1.9 x 1.6 cm; parenchymal nodularity left upper lobe; spiculated nodule lingula, 9 mm x 9 mm; new atelectasis right lower lung lobe; scattered punctate 1-2 mm nodules right lower lobe; no significant mediastinal lymphadenopathy)  Thyroid nodules    03/10/2023: CT temporal bone with contrast (evaluation of left parotid gland malignant neoplasm):  Unremarkable temporal bone CT    03/10/2023: CT soft tissue of the neck with contrast (evaluation of malignant neoplasm of parotid gland) (comparison:  PET-CT 02/16/2023):  2.5 cm left parotid tail mass; no cervical  lymphadenopathy (left parotid tail mass 1.8 x 2.5 cm, abuts the skin surface; no definitive evidence of perineural spread)    03/23/2023:  FNA right thyroid nodule:  -cytomorphology of a benign thyroid nodule    05/25/2023:  Right upper lung lobe nodule, CT-guided needle core biopsy:  -scant fragments of-scant fragment of adenocarcinoma (history of poorly-differentiated carcinoma of parotid gland is noted; a metastasis is favored)    Interval History:   6/27/2023: Patient presents for chemoeducation today for treatment of poorly-differentiated carcinoma of left parotid gland, with lung metastases, stage IVC. He presents alone. States he has no close friend or immediate family members to come with him. He feels well.   Reports no changes from last visit. He has severe hearing loss.   Mediport placement scheduled for 7/10/2023     OP PACLITAXEL CARBOPLATIN (AUC 5) Q3W      Medications:  Current Outpatient Medications on File Prior to Visit   Medication Sig Dispense Refill    cetirizine (ZYRTEC) 10 MG tablet Take 10 mg by mouth 2 (two) times daily as needed for Allergies.      tamsulosin (FLOMAX) 0.4 mg Cap Take 1 capsule (0.4 mg total) by mouth every evening. 30 capsule 2    trimethoprim (TRIMPEX) 100 mg Tab Take 100 mg by mouth once daily.       Current Facility-Administered Medications on File Prior to Visit   Medication Dose Route Frequency Provider Last Rate Last Admin    sodium chloride 0.9% flush 10 mL  10 mL Intravenous PRN Compa Garcia MD           Review of Systems:   All systems reviewed and found to be negative except for the symptoms detailed above    Physical Examination:   VITAL SIGNS:   Vitals:    06/27/23 1305   BP: 133/70   Pulse: 82   Resp: 20   Temp: 97.7 °F (36.5 °C)     GENERAL:  Has a disheveled appearance. Does not appear ill and is no apparent distress.    HEAD:  No signs of head trauma.  EARS:  Very [place]  LUNGS: Normal effort.  NEURO: Alert and oriented x 3.  Speech normal.  Follows  commands.  PSYCHIATRIC: Appears mildly anxious.      No results for input(s): CBC in the last 72 hours.   No results for input(s): CMP in the last 72 hours.     Assessment:  Problem List Items Addressed This Visit    None    Metastatic poorly-differentiated carcinoma/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:   -presentation:  Left neck mass for a month  -on physical exam by ENT 05/31/2023:  3.5 cm firm mass left tail of parotid, fixed to the adjacent sternocleidomastoid muscle/masseter muscle as well as overlying skin; 3 new subcentimeter nodules fixed to the skin in the left post auricular skin; hypopigmented skin lesion inferior to the mass as well  -FNA left superior level 5 mass 01/19/2023:  Poorly-differentiated carcinoma, consistent with a poorly-differentiated carcinoma in which a high-grade primary parotid malignancy or metastatic carcinoma is in the differential   -left parotid gland mass 1.9 x 2.0 x 2.4 cm on noncontrast CT neck 01/24/2023  -hypermetabolic left parotid nodule 1.9 x 2 cm on FDG PET-CT 02/16/2023  -2.5 cm left parotid tail mass on CT neck with contrast 03/10/2023, abutting the skin  -no cervical lymphadenopathy  ----  -small nodules left upper lung lobe, up to 2 mm, on noncontrast CT neck 01/24/2023  -bilateral hypermetabolic upper lobe lung nodules on FDG PET-CT 02/16/2023, largest nodule right upper lung lobe 1.9 cm  Bilateral lung nodules, similar, on contrast-enhanced chest CT 03/10/2023 (largest nodule right upper lung lobe, 1.9 cm)  -right upper lung lobe lung lobe nodule CT-guided needle core biopsy 05/25/2023:  Adenocarcinoma (a metastases from poorly-differentiated carcinoma of parotid gland, is favored)  ---  -by virtue of skin involvement, cT4a  -cN0  -lung metastases, therefore, M1  >> cT4 a cN0 M1, stage IVC      Past 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 Esquivel), with Mohs resection    Benign thyroid  nodule:  -suspected hypermetabolic right thyroid nodule on FDG PET-CT 02/16/2023  -single solid 14 x 9 x 11 mm right thyroid nodule, TI-RADS 4, on thyroid ultrasound 03/10/2023  -FNA right thyroid nodule 03/23/2023: Benign thyroid nodule       # Encounter for antineoplastic chemotherapy:   Chemotherapy education provided to patient.   Reviewed chemotherapy regimen(number of cycles, duration and frequency).   Tips for chemotherapy infusion day. Infection precautions. Management of body fluids.   Potential side effects of chemotherapy including, but not limited to low blood counts, nausea, vomiting, taste changes, hair loss, weakness, blood test abnormalities, peripheral neuropathy, mouth sores, constipation, diarrhea, nail changes, central neurotoxicity, Nephrotoxicity, Ototoxicity, heart failure, blood clots, strokes, arthralgias/myalgias, and hypersensitivity reaction.    Signs and symptoms that require immediate notification to this office reviewed, including fever of 100.4 or greater or symptoms of infection. He has a thermometer at home.   Unusual symptoms that he is to notify this office of within 24 hours of presenting reviewed.   Literature for paclitaxel and Carboplatin was provided to patient from [employer].   Prescriptions for ondansetron and Dexamethasone was e-sent to patient's pharmacy. He was reminded prior to end of visit to remember to  from his pharmacy. He verbalized his understanding.   He has been instructed to  Immodium-AD from pharmacy while there to have on hand.   Importance of daily hydration with water and other non-caffeinated drinks reviewed. Recommended daily amount reviewed.   He was given time to ask questions and express concerns regarding care and treatment. All of his questions were answered to his full satisfaction.   Barrier to learning - Lummi. He may benefit from reinforcement of teaching during chemo administration.   Methods of teaching included chemo  medication handouts. Went over information next to patient, so he could read info as it was discussed.   Recommended that he keep a personal journal to write down any questions or symptoms as he undergoes treatment.   The phone number for this office was written down for patient. Advised him to seek ER care for any acute concerns after hours or on weekends.       Plan:  Re-stage with MRI with contrast of skull-base to clavicle and CT chest with contrast    Has lung metastases, proven with biopsy  Needs palliative systemic therapy  Expectant management for slow-growing disease, is also an option    Systemic palliative chemotherapy options:  -Cisplatin/Navelbine  -cisplatin/doxorubicin/cyclophosphamide (category 2B)  -paclitaxel (category 2A for non adenoid cystic carcinoma)  -carboplatin/Taxol  -carboplatin/gemcitabine    Useful in certain circumstances:   -androgen receptor therapy for AR pos tumors: Leuprolide; bicalutamide  -NTRK therapy for NTRK gene fusion pos tumors: Larotrectinib; entrectinib  -HER2 targeted therapy for HER2 pos tumors: Trastuzumab; ado trastuzumab emtansine; trastuzumab/pertuzumab; docetaxel/trastuzumab; Fam-trastuzumab deruxtecan (category 2B)  -axitinib, sorafenib: Category 2B  -lenvatinib (category 2B) for adenoid cystic carcinoma  -pembrolizumab (for microsatellite instability-high, i.e., MSI-H, MMR deficient, i.e., dMMR, TMB-H (=/> 10 mut/Mb)   -dabrafenib/trametinib for BRAF V600 E pos tumors  -Selpercatinib for RET gene fusion pos tumors  >>>  Will order NGS testing on biopsy of parotid gland or lung metastasis (to include, androgen receptor, NTRK gene fusion, HER2, MSI/MMR, TMB, BRAF V600 E, RET gene fusion)    To start with, will treat with carboplatin/Taxol   Regimen:  -paclitaxel 175 mg per m2 IV over 3 hours on day 1, followed by  -carboplatin AUC 5.5 IV over 30 minutes on day 1  Repeat cycle every 21 days until disease progression or unacceptable toxicity  Or,   -paclitaxel 200  mg per m2 IV over 3 hours on day 1, followed by  -carboplatin AUC 6 IV over 30 minutes on day 1   Repeat cycle every 21 days until disease progression or unacceptable toxicity    During chemotherapy, check CBC and CMP weekly  Re-stage with imaging studies a couple of months after starting chemotherapy    Potential side effects of chemotherapy discussed in detail.    Formal chemotherapy teaching with nursing staff to follow.    06/12/2023:   He is adamant to have parotid gland tumor surgically excised   Explained to him multiple X that with metastatic disease, this is not feasible   However, he is adamant to have the parotid gland tumor excised because, in his view, this is the one which is causing metastases and that it needs to be removed.    I advised him to follow-up with ENT to have concerns addressed once again.    He will return to clinic on 8/28/2023 for cycle # 1 of Paclitaxel/Carboplatin. Labs done prior.   He will not have a medi-port placed until 7/10/2023, so he will have a PICC line placed in the a.m. prior to treatment.  He will return to clinic in 1 week for post treatment toxicity evaluation, labs prior.   Brain MRI and Chest CT are scheduled for 6/28/2023.   Follow-up visit with MD in 2 weeks.    Above discussed at length with the patient.  All questions answered.    Discussed labs, scans, pathology report, and staging of parotid gland cancer, and gave him copies of relevant reports.    Discussed the following:  That he has stage IV disease which is incurable; long-term prognosis is guarded; palliation can be attempted with systemic therapy but response can not be guaranteed.    He understands and agrees with this plan.

## 2023-06-28 ENCOUNTER — DOCUMENTATION ONLY (OUTPATIENT)
Dept: HEMATOLOGY/ONCOLOGY | Facility: CLINIC | Age: 88
End: 2023-06-28
Payer: MEDICARE

## 2023-06-28 ENCOUNTER — INFUSION (OUTPATIENT)
Dept: INFUSION THERAPY | Facility: HOSPITAL | Age: 88
End: 2023-06-28
Attending: INTERNAL MEDICINE
Payer: MEDICARE

## 2023-06-28 ENCOUNTER — HOSPITAL ENCOUNTER (OUTPATIENT)
Dept: RADIOLOGY | Facility: HOSPITAL | Age: 88
Discharge: HOME | End: 2023-06-28
Attending: INTERNAL MEDICINE
Payer: MEDICARE

## 2023-06-28 VITALS
HEART RATE: 68 BPM | DIASTOLIC BLOOD PRESSURE: 68 MMHG | HEIGHT: 71 IN | RESPIRATION RATE: 20 BRPM | SYSTOLIC BLOOD PRESSURE: 110 MMHG | TEMPERATURE: 98 F | WEIGHT: 167.13 LBS | BODY MASS INDEX: 23.4 KG/M2 | OXYGEN SATURATION: 97 %

## 2023-06-28 DIAGNOSIS — R79.89 ELEVATED SERUM CREATININE: ICD-10-CM

## 2023-06-28 DIAGNOSIS — C07 ADENOCARCINOMA OF PAROTID GLAND: ICD-10-CM

## 2023-06-28 DIAGNOSIS — C07 ADENOCARCINOMA OF PAROTID GLAND: Primary | ICD-10-CM

## 2023-06-28 DIAGNOSIS — C07 CARCINOMA OF PAROTID GLAND: Primary | ICD-10-CM

## 2023-06-28 DIAGNOSIS — C07 CARCINOMA OF PAROTID GLAND: ICD-10-CM

## 2023-06-28 PROCEDURE — 25000003 PHARM REV CODE 250: Performed by: NURSE PRACTITIONER

## 2023-06-28 PROCEDURE — 96415 CHEMO IV INFUSION ADDL HR: CPT

## 2023-06-28 PROCEDURE — 96367 TX/PROPH/DG ADDL SEQ IV INF: CPT

## 2023-06-28 PROCEDURE — 96361 HYDRATE IV INFUSION ADD-ON: CPT | Mod: 59

## 2023-06-28 PROCEDURE — 96375 TX/PRO/DX INJ NEW DRUG ADDON: CPT | Mod: 59

## 2023-06-28 PROCEDURE — 96413 CHEMO IV INFUSION 1 HR: CPT

## 2023-06-28 PROCEDURE — 96417 CHEMO IV INFUS EACH ADDL SEQ: CPT

## 2023-06-28 PROCEDURE — 63600175 PHARM REV CODE 636 W HCPCS: Performed by: INTERNAL MEDICINE

## 2023-06-28 PROCEDURE — 36569 INSJ PICC 5 YR+ W/O IMAGING: CPT

## 2023-06-28 PROCEDURE — 63600175 PHARM REV CODE 636 W HCPCS: Performed by: NURSE PRACTITIONER

## 2023-06-28 RX ORDER — DIPHENHYDRAMINE HYDROCHLORIDE 50 MG/ML
50 INJECTION INTRAMUSCULAR; INTRAVENOUS
Status: COMPLETED | OUTPATIENT
Start: 2023-06-28 | End: 2023-06-28

## 2023-06-28 RX ORDER — FAMOTIDINE 10 MG/ML
20 INJECTION INTRAVENOUS
Status: COMPLETED | OUTPATIENT
Start: 2023-06-28 | End: 2023-06-28

## 2023-06-28 RX ORDER — EPINEPHRINE 0.3 MG/.3ML
0.3 INJECTION SUBCUTANEOUS ONCE AS NEEDED
Status: DISCONTINUED | OUTPATIENT
Start: 2023-06-28 | End: 2023-06-28 | Stop reason: HOSPADM

## 2023-06-28 RX ORDER — SODIUM CHLORIDE 0.9 % (FLUSH) 0.9 %
10 SYRINGE (ML) INJECTION
Status: CANCELLED | OUTPATIENT
Start: 2023-06-28

## 2023-06-28 RX ORDER — HEPARIN 100 UNIT/ML
500 SYRINGE INTRAVENOUS
Status: DISCONTINUED | OUTPATIENT
Start: 2023-06-28 | End: 2023-06-28 | Stop reason: HOSPADM

## 2023-06-28 RX ORDER — EPINEPHRINE 0.3 MG/.3ML
0.3 INJECTION SUBCUTANEOUS ONCE AS NEEDED
Status: CANCELLED | OUTPATIENT
Start: 2023-06-28

## 2023-06-28 RX ORDER — HEPARIN 100 UNIT/ML
500 SYRINGE INTRAVENOUS
Status: CANCELLED | OUTPATIENT
Start: 2023-06-28

## 2023-06-28 RX ORDER — SODIUM CHLORIDE 0.9 % (FLUSH) 0.9 %
10 SYRINGE (ML) INJECTION
Status: DISCONTINUED | OUTPATIENT
Start: 2023-06-28 | End: 2023-06-28 | Stop reason: HOSPADM

## 2023-06-28 RX ORDER — FAMOTIDINE 10 MG/ML
20 INJECTION INTRAVENOUS
Status: CANCELLED | OUTPATIENT
Start: 2023-06-28

## 2023-06-28 RX ORDER — DIPHENHYDRAMINE HYDROCHLORIDE 50 MG/ML
50 INJECTION INTRAMUSCULAR; INTRAVENOUS ONCE AS NEEDED
Status: DISCONTINUED | OUTPATIENT
Start: 2023-06-28 | End: 2023-06-28 | Stop reason: HOSPADM

## 2023-06-28 RX ORDER — DIPHENHYDRAMINE HYDROCHLORIDE 50 MG/ML
50 INJECTION INTRAMUSCULAR; INTRAVENOUS ONCE AS NEEDED
Status: CANCELLED | OUTPATIENT
Start: 2023-06-28

## 2023-06-28 RX ADMIN — SODIUM CHLORIDE 1000 ML: 9 INJECTION, SOLUTION INTRAVENOUS at 08:06

## 2023-06-28 RX ADMIN — DIPHENHYDRAMINE HYDROCHLORIDE 50 MG: 50 INJECTION, SOLUTION INTRAMUSCULAR; INTRAVENOUS at 09:06

## 2023-06-28 RX ADMIN — PACLITAXEL 342 MG: 6 INJECTION, SOLUTION INTRAVENOUS at 10:06

## 2023-06-28 RX ADMIN — SODIUM CHLORIDE 500 ML: 9 INJECTION, SOLUTION INTRAVENOUS at 09:06

## 2023-06-28 RX ADMIN — PALONOSETRON HYDROCHLORIDE 0.25 MG: 0.25 INJECTION, SOLUTION INTRAVENOUS at 09:06

## 2023-06-28 RX ADMIN — FAMOTIDINE 20 MG: 10 INJECTION, SOLUTION INTRAVENOUS at 09:06

## 2023-06-28 RX ADMIN — APREPITANT 130 MG: 130 INJECTION, EMULSION INTRAVENOUS at 09:06

## 2023-06-28 RX ADMIN — CARBOPLATIN 320 MG: 10 INJECTION INTRAVENOUS at 01:06

## 2023-06-28 NOTE — NURSING
0700 Patient is here for labs, PICC placement, C1 Taxol, Carboplatin. Voices no c/o.   1400 Reviewed  future appt dates & medications to take on days 2,3 & 4 of chemotherapy . Patient tolerated treatment without incident.

## 2023-06-28 NOTE — NURSING
PADMINI Blair met with patient for educational visit. Patient in clinic for chemotherapy. Reviewed RN Johnnie contact information and role in patient's care.    Reviewed NCCN Distress Screen. Patient reported his/her level of distress at 4. Patient had already started with pre-meds. Says he feels sleepy.    Provided educate on signs of infection, infection prevention through good hand hygiene and wearing mask, adequate nutrition/hydration, skin care along with sunscreen, and oral care. Reviewed contact information for clinic and information related to [facility] communication. Discussed communication process for some common scenarios in which patient should call provider for guidance vs. immediately report to the emergency room. Patient gave permission for PADMINI Blair to assist with phone settings to find ring tone and sound that patient can hear. Patient is unable to access his voice mail due to unknown password. Discussed patient checking missed calls frequently and to be able to return calls. Patient saved RN Johnnie contact in phone. Also provided information for [facility] [employer] [facility] [phone] for Michelle. Patient agreed to home health services.   PADMINI Blair reviewed educational material regarding community and cancer resources. Patient voices understanding and agrees to contact PADMINI Blair for any needs/concerns.      Oncology Navigation   Intake  Cancer Type: Head and Neck  Initial Nurse Navigator Contact: 06/12/23  Date Worked: 06/28/23  Appointment Date: 06/12/23  Start of Treatment: 06/28/23     Treatment  Current Status: Active    Surgery: Planned  Type of Surgery: patient wishes to have surgery    Medical Oncologist: Tony Kong MD  Consult Date: 06/12/23  Chemotherapy: Initiated  Chemotherapy Regimen: PACLITAXEL CARBOPLATIN       Procedures: MRI    General Referrals: [employer]; [facility]          Barriers of Care: Financial concerns; Social needs  Social Needs: Transportation needs     Acuity  Stage:  2  Systemic Treatment - predicted or initiated: Chemotherapy Regimen with Multiple drugs (+1)  Treatment Tolerability: Has not started treatment yet/treatment fully completed and side effects resolved  ECO  Hospitalization Within the Past Month: 0   Needed: 0  Support: 2  Verbalizes Financial Concerns: 1  Transportation: 1  Mental Health: PHQ Score: 0  Verbalizes the need for more education: 0  Navigation Acuity: 8     Follow Up  Follow up in about 2 days (around 2023) for test patient's phone.

## 2023-06-29 ENCOUNTER — HOSPITAL ENCOUNTER (OUTPATIENT)
Dept: RADIOLOGY | Facility: HOSPITAL | Age: 88
Discharge: HOME | End: 2023-06-29
Attending: INTERNAL MEDICINE
Payer: MEDICARE

## 2023-06-29 DIAGNOSIS — C78.01 SECONDARY ADENOCARCINOMA OF RIGHT LUNG: ICD-10-CM

## 2023-06-29 DIAGNOSIS — C79.2 CARCINOMATOUS METASTASIS IN SKIN: ICD-10-CM

## 2023-06-29 DIAGNOSIS — C44.329 SQUAMOUS CELL CARCINOMA OF SKIN OF CHEEK: ICD-10-CM

## 2023-06-29 DIAGNOSIS — C07 CARCINOMA OF PAROTID GLAND: ICD-10-CM

## 2023-06-29 DIAGNOSIS — C44.90 SKIN CANCER: ICD-10-CM

## 2023-06-29 DIAGNOSIS — C07 ADENOCARCINOMA OF PAROTID GLAND: ICD-10-CM

## 2023-06-29 DIAGNOSIS — C78.01 SECONDARY MALIGNANCY OF RIGHT LUNG: ICD-10-CM

## 2023-06-29 PROCEDURE — G0180 MD CERTIFICATION HHA PATIENT: HCPCS | Mod: ,,, | Performed by: INTERNAL MEDICINE

## 2023-06-29 PROCEDURE — G0180 PR HOME HEALTH MD CERTIFICATION: ICD-10-PCS | Mod: ,,, | Performed by: INTERNAL MEDICINE

## 2023-06-29 PROCEDURE — 25500020 PHARM REV CODE 255: Performed by: INTERNAL MEDICINE

## 2023-06-29 PROCEDURE — 71260 CT THORAX DX C+: CPT | Mod: TC

## 2023-06-29 PROCEDURE — A9577 INJ MULTIHANCE: HCPCS

## 2023-06-29 PROCEDURE — 25500020 PHARM REV CODE 255

## 2023-06-29 PROCEDURE — 70553 MRI BRAIN STEM W/O & W/DYE: CPT | Mod: TC

## 2023-06-29 RX ADMIN — GADOBENATE DIMEGLUMINE 16 ML: 529 INJECTION, SOLUTION INTRAVENOUS at 01:06

## 2023-06-29 RX ADMIN — IOHEXOL 100 ML: 350 INJECTION, SOLUTION INTRAVENOUS at 12:06

## 2023-07-06 ENCOUNTER — INFUSION (OUTPATIENT)
Dept: INFUSION THERAPY | Facility: HOSPITAL | Age: 88
End: 2023-07-06
Attending: INTERNAL MEDICINE
Payer: MEDICARE

## 2023-07-06 ENCOUNTER — OFFICE VISIT (OUTPATIENT)
Dept: HEMATOLOGY/ONCOLOGY | Facility: CLINIC | Age: 88
End: 2023-07-06
Payer: MEDICARE

## 2023-07-06 VITALS
WEIGHT: 169.31 LBS | OXYGEN SATURATION: 97 % | BODY MASS INDEX: 22.93 KG/M2 | BODY MASS INDEX: 23.7 KG/M2 | DIASTOLIC BLOOD PRESSURE: 77 MMHG | TEMPERATURE: 98 F | HEART RATE: 70 BPM | SYSTOLIC BLOOD PRESSURE: 131 MMHG | SYSTOLIC BLOOD PRESSURE: 149 MMHG | WEIGHT: 169.31 LBS | DIASTOLIC BLOOD PRESSURE: 79 MMHG | TEMPERATURE: 98 F | HEART RATE: 65 BPM | RESPIRATION RATE: 20 BRPM | HEIGHT: 71 IN | HEIGHT: 72 IN | OXYGEN SATURATION: 98 % | RESPIRATION RATE: 18 BRPM

## 2023-07-06 DIAGNOSIS — D70.1 CHEMOTHERAPY-INDUCED NEUTROPENIA: Primary | ICD-10-CM

## 2023-07-06 DIAGNOSIS — C07 ADENOCARCINOMA OF PAROTID GLAND: Primary | ICD-10-CM

## 2023-07-06 DIAGNOSIS — C78.01 SECONDARY MALIGNANCY OF RIGHT LUNG: ICD-10-CM

## 2023-07-06 DIAGNOSIS — T45.1X5A CHEMOTHERAPY-INDUCED NEUTROPENIA: Primary | ICD-10-CM

## 2023-07-06 DIAGNOSIS — C78.01 SECONDARY ADENOCARCINOMA OF RIGHT LUNG: ICD-10-CM

## 2023-07-06 DIAGNOSIS — C07 ADENOCARCINOMA OF PAROTID GLAND: ICD-10-CM

## 2023-07-06 PROCEDURE — 99215 OFFICE O/P EST HI 40 MIN: CPT | Mod: S$PBB,,, | Performed by: NURSE PRACTITIONER

## 2023-07-06 PROCEDURE — 96523 IRRIG DRUG DELIVERY DEVICE: CPT

## 2023-07-06 PROCEDURE — A4216 STERILE WATER/SALINE, 10 ML: HCPCS | Performed by: INTERNAL MEDICINE

## 2023-07-06 PROCEDURE — 99215 PR OFFICE/OUTPT VISIT, EST, LEVL V, 40-54 MIN: ICD-10-PCS | Mod: S$PBB,,, | Performed by: NURSE PRACTITIONER

## 2023-07-06 PROCEDURE — 25000003 PHARM REV CODE 250: Performed by: INTERNAL MEDICINE

## 2023-07-06 PROCEDURE — 99213 OFFICE O/P EST LOW 20 MIN: CPT | Mod: PBBFAC | Performed by: NURSE PRACTITIONER

## 2023-07-06 RX ORDER — HEPARIN 100 UNIT/ML
500 SYRINGE INTRAVENOUS
Status: DISCONTINUED | OUTPATIENT
Start: 2023-07-06 | End: 2023-07-06 | Stop reason: HOSPADM

## 2023-07-06 RX ORDER — SODIUM CHLORIDE 0.9 % (FLUSH) 0.9 %
10 SYRINGE (ML) INJECTION
Status: CANCELLED | OUTPATIENT
Start: 2023-07-12

## 2023-07-06 RX ORDER — HEPARIN 100 UNIT/ML
500 SYRINGE INTRAVENOUS
Status: CANCELLED | OUTPATIENT
Start: 2023-07-12

## 2023-07-06 RX ORDER — CIPROFLOXACIN 500 MG/1
500 TABLET ORAL EVERY 12 HOURS
Qty: 14 TABLET | Refills: 0 | Status: SHIPPED | OUTPATIENT
Start: 2023-07-06 | End: 2023-07-13

## 2023-07-06 RX ORDER — SODIUM CHLORIDE 0.9 % (FLUSH) 0.9 %
10 SYRINGE (ML) INJECTION
Status: COMPLETED | OUTPATIENT
Start: 2023-07-06 | End: 2023-07-06

## 2023-07-06 RX ADMIN — Medication 10 ML: at 01:07

## 2023-07-06 NOTE — NURSING
13:10 Pt arrive for a Picc line dressing change area around dressing red and irritated pt verbalized scratching the area. The picc line dressing had a large amount of dried blood. Pt c/o of weakness to lower legs. Scheduled to return to clinic 7/12/23.  15:20 Pt WBC level low messaged L Capdeboscq will begin antibiotics and repeat lab 7/10/23.

## 2023-07-06 NOTE — PROGRESS NOTES
History:  Past Medical History:   Diagnosis Date    Skin cancer    Social history:  Single.  No children.  Lives in [place], [place].  Drinks 4 cans of beer over the weekend.  No tobacco, alcohol, or illicit drug abuse. He unfortunately has several social factors that may affect his care including the fact that he is of older age, lives alone, can not always hear his phone, and has no surroudning family or friends to help. He has a car that he can drive, but has no one to drive him to and from appointments where he may need sedation.    Family history:  Mother experience some kind of skin cancer in her 90s.  Several uncles had unknown kind of cancer.    Health maintenance:  Primary care provider in [place], [place].  Past Surgical History:   Procedure Laterality Date    BIOPSY OF THYROID Right       Social History     Socioeconomic History    Marital status: Single   Tobacco Use    Smoking status: Never     Passive exposure: Never    Smokeless tobacco: Never   Substance and Sexual Activity    Alcohol use: Yes     Alcohol/week: 3.0 standard drinks     Types: 3 Cans of beer per week     Comment: 3 cans of beer on the weekend    Drug use: Never    Sexual activity: Not Currently      History reviewed. No pertinent family history.     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, S/P WLE in 2016, S/P Mohs resection  -benign thyroid nodule right thyroid lobe    History of Present Illness:   Cancer (Adenocarcinoma of parotid gland)    Oncologic/Hematologic History:  Oncology History   Adenocarcinoma of parotid gland   5/25/2023 Cancer Staged    Staging form: Major Salivary Glands, AJCC 8th Edition  - Clinical stage  from 5/25/2023: Stage IVC (cT4a, cN0, pM1)     6/10/2023 Initial Diagnosis    Adenocarcinoma of parotid gland     6/28/2023 -  Chemotherapy    Treatment Summary   Plan Name: OP PACLITAXEL CARBOPLATIN (AUC 5) Q3W  Treatment Goal: Palliative  Status: Active  Start Date: 6/28/2023  End Date: 5/29/2024 (Planned)  Provider: Tony Kong MD  Chemotherapy: CARBOplatin (PARAPLATIN) 320 mg in sodium chloride 0.9% 317 mL chemo infusion, 320 mg (100 % of original dose 321.6 mg), Intravenous, Clinic/HOD 1 time, 1 of 17 cycles  Dose modification:   (original dose 321.6 mg, Cycle 1)  PACLitaxeL (TAXOL) 175 mg/m2 = 342 mg in sodium chloride 0.9% 622 mL chemo infusion, 175 mg/m2 = 342 mg, Intravenous, Clinic/HOD 1 time, 1 of 17 cycles     87-year-old gentleman, referred from [facility], with metastatic adenocarcinoma of parotid gland, with lung metastases (proven with biopsy).      Erik Lundy is a 87 y.o. 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 Esquivel with Mohs resection.  He was recently referred to [facility] for evaluation of a left neck mass, present for a month.    FNA of left parotid mass resulted in poorly-differentiated carcinoma  Reported 7 lb weight loss over 2 months  Denied dysphagia, odynophagia, dysphonia, dyspnea, hematemesis, hemoptysis, focal weakness, new skin lesions or additional masses in head or neck area.    Physical exam by ENT on 05/31/2023: 3.5 cm firm mass involving left tail of parotid, fixed to the adjacent sternocleidomastoid muscle/masseter muscle as well as overlying skin; 3 new subcentimeter nodules fixed to the skin in the left postauricular skin just superior to this area; also a hypopigmented skin lesion inferior to the mass; no palpable lymphadenopathy; nontender; no thyroid nodules or goiter  Developed some numbness in the periauricular area on the left for 2-3 weeks    Imaging studies/biopsy reports as below:      01/19/2023:  FNA  without imaging, left superior level 5:   -pos for malignant cells; poorly-differentiated carcinoma (consistent with a poorly-differentiated carcinoma in which a high-grade primary parotid malignancy or metastatic carcinoma is in the differential)    -01/24/2023:  CT neck without contrast (evaluation of mass left side of neck):  1.9 x 2.0 x 2.4 cm mass left parotid gland  Multiple thyroid nodules measuring up to 1.2 cm  Small nodules left upper lung lobe met, measuring up to 2 mm    02/16/2023: FDG PET-CT (evaluation of poorly-differentiated carcinoma involving left parotid gland) (comparison:  Outside CT neck 01/24/2023):  1. Hypermetabolic left parotid nodule (1.9 x 2 cm, maximum SUV 12.8)  2. Bilateral hypermetabolic upper lobe pulmonary nodules (spiculated right upper lung lobe nodule 1.9 cm, maximum SUV 4.8; left upper lobe lung nodule 9 mm, maximum SUV 2.9)  3. Suspected hypermetabolic right thyroid lobe nodule.    03/10/2023: Ultrasound thyroid (evaluation of nontoxic single thyroid nodule):   Mostly cystic nodules in the right and left alyson thyroid with exception of a single solid slightly hypoechoic nodule 14 x 9 x 11 mm corresponding to a TI-RADS type 4 nodule    03/10/2023: CT chest with contrast (PET pos lung nodules, concerning for metastases from left parotid gland malignancy) (comparison:  PET-CT 02/16/2023):  Bilateral lung nodules overall similar to the prior examination.  Findings are consistent with metastatic disease.  (Spiculated nodule right upper lung lobe, 1.9 x 1.6 cm; parenchymal nodularity left upper lobe; spiculated nodule lingula, 9 mm x 9 mm; new atelectasis right lower lung lobe; scattered punctate 1-2 mm nodules right lower lobe; no significant mediastinal lymphadenopathy)  Thyroid nodules    03/10/2023: CT temporal bone with contrast (evaluation of left parotid gland malignant neoplasm):  Unremarkable temporal bone CT    03/10/2023: CT soft tissue of the neck with contrast  (evaluation of malignant neoplasm of parotid gland) (comparison:  PET-CT 02/16/2023):  2.5 cm left parotid tail mass; no cervical lymphadenopathy (left parotid tail mass 1.8 x 2.5 cm, abuts the skin surface; no definitive evidence of perineural spread)    03/23/2023:  FNA right thyroid nodule:  -cytomorphology of a benign thyroid nodule    05/25/2023:  Right upper lung lobe nodule, CT-guided needle core biopsy:  -scant fragments of-scant fragment of adenocarcinoma (history of poorly-differentiated carcinoma of parotid gland is noted; a metastasis is favored)    Interval History:   7/6/2023: Patient presents for a one week follow up and toxicity evaluation. He completed cycle # 1 Paclitaxel and carboplatin(AUC 5) on 6/28/2023 for  poorly-differentiated carcinoma of left parotid gland, with lung metastases, stage IVC. He presents alone.   The day after treatment, he experienced heavy sweating, constipation, urinary incontinence(describes as a dribble), distortion of taste. Heavy sweating lasted for couple days, urinary incontinence lasted for 3 days. Occurred 3-4 times. He attributes all 3 symptoms to steroids.   States all resolved with stopping of Dexamethasone. Took as directed. Constipation treated with senna and epsom salt. On Saturday after treatment, he took one senna and 3 tsps of epsom salt. Took 2 senna tablets 2 days later, and had a cleansing bowel movement.   Describes stools as hard initially, then softened. He is feeling well today. All symptoms resolved. He reports he has noticed a decrease in left parotid gland. States the upper part has flattened.   Reports no changes in his hearing, does have some numbness to his left ear lobe. Present long term prior to treatment and not worsened with treatment.   He denies any nausea, vomiting, weakness, cough, SOB, itching, rashes, abdominal pain, diarrhea, mouth sores, fever or infection. No numbness/tingling, hands or feet. No swelling. No tinnitus, no dysuria  , no hematuria, no flank pain.   Mediport placement scheduled for 7/10/2023. States he does not have a time yet. Ms Lata(nurse navigator) was working on setting up transportation for him, as they do not recommend him driving home after placement.      OP PACLITAXEL CARBOPLATIN (AUC 5) Q3W      Medications:  Current Outpatient Medications on File Prior to Visit   Medication Sig Dispense Refill    cetirizine (ZYRTEC) 10 MG tablet Take 10 mg by mouth 2 (two) times daily as needed for Allergies.      dexAMETHasone (DECADRON) 4 MG Tab Take 2 tablets(8 mg) once daily on days 2,3, and 4 of chemotherapy cycle. 24 tablet 1    ondansetron (ZOFRAN) 8 MG tablet Take 1 tablet (8 mg total) by mouth every 8 (eight) hours as needed for Nausea (Nausea). 30 tablet 2    tamsulosin (FLOMAX) 0.4 mg Cap Take 1 capsule (0.4 mg total) by mouth every evening. 30 capsule 2    trimethoprim (TRIMPEX) 100 mg Tab Take 100 mg by mouth once daily.       Current Facility-Administered Medications on File Prior to Visit   Medication Dose Route Frequency Provider Last Rate Last Admin    sodium chloride 0.9% flush 10 mL  10 mL Intravenous PRN Compa Garcia MD           Review of Systems:   All systems reviewed and found to be negative except for the symptoms detailed above    Physical Examination:   VITAL SIGNS:   Vitals:    07/06/23 1401   BP: (!) 149/79   Pulse: 65   Resp: 18   Temp: 97.6 °F (36.4 °C)     GENERAL:  Unshaven, but less of a disheveled look this visit. Very Pleasant gentleman. Does not appear ill and is no apparent distress.    HEAD:  No signs of head trauma.  EARS: very Circle.   EYES:  Pupils are equal.  Extraocular motions intact.   MOUTH: Moist and clear. No oral ulcers, exudate or erythema.   NECK:   firm mass involving the left tail of parotid, smaller nodule directly above. Uneven surface. No tenderness or erythema. No cervical or supraclavicular region lymphadenopathy.   CHEST: Normal effort. Clear bilateral breath sounds. No  rales, rhonchi or wheezing.   CARDIAC:  Regular rate and rhythm.  S1 and S2, without murmurs, gallops, rubs.  VASCULAR:  No Edema.  Peripheral pulses normal and equal in all extremities.  ABDOMEN: flat. Non-tender. Positive bowel sounds. No distention. No HSM palpable.   MUSCULOSKELETAL:  Good range of motion of all major joints. Extremities withouta clubbing, cyanosis or edema.   NEURO: Alert and oriented x 3.  Speech normal. No focal sensory or strength deficits. Follows commands.  PSYCHIATRIC: Appropriate mood and affect.      LABS: 2023: WBC 1.17, ,  H/H 19.0, 56.1, PLT 71k. Creat 1.61, normal LFTs, mag 2.20    Imagin2023: Brain MRI - No evidence of intracranial metastatic disease.  2023: CT CHEST WITH CONTRAST -     1. Improved right upper lung lobe spiculated nodule in size.     2. Improved left upper lung lobe previously present nodule.     3. New irregular defined nodule left lung lingular segment and minimal new punctate nodules left lower lung lobe.    Assessment:  Problem List Items Addressed This Visit    None    Metastatic poorly-differentiated carcinoma/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:   -presentation:  Left neck mass for a month  -on physical exam by ENT 2023:  3.5 cm firm mass left tail of parotid, fixed to the adjacent sternocleidomastoid muscle/masseter muscle as well as overlying skin; 3 new subcentimeter nodules fixed to the skin in the left post auricular skin; hypopigmented skin lesion inferior to the mass as well  -FNA left superior level 5 mass 2023:  Poorly-differentiated carcinoma, consistent with a poorly-differentiated carcinoma in which a high-grade primary parotid malignancy or metastatic carcinoma is in the differential   -left parotid gland mass 1.9 x 2.0 x 2.4 cm on noncontrast CT neck 2023  -hypermetabolic left parotid nodule 1.9 x 2 cm on FDG PET-CT 2023  -2.5 cm left parotid tail mass on CT neck with  contrast 03/10/2023, abutting the skin  -no cervical lymphadenopathy  ----  -small nodules left upper lung lobe, up to 2 mm, on noncontrast CT neck 01/24/2023  -bilateral hypermetabolic upper lobe lung nodules on FDG PET-CT 02/16/2023, largest nodule right upper lung lobe 1.9 cm  Bilateral lung nodules, similar, on contrast-enhanced chest CT 03/10/2023 (largest nodule right upper lung lobe, 1.9 cm)  -right upper lung lobe lung lobe nodule CT-guided needle core biopsy 05/25/2023:  Adenocarcinoma (a metastases from poorly-differentiated carcinoma of parotid gland, is favored)  ---  -by virtue of skin involvement, cT4a  -cN0  -lung metastases, therefore, M1  >> cT4 a cN0 M1, stage IVC      Past 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 Esquivel), with Mohs resection    Benign thyroid nodule:  -suspected hypermetabolic right thyroid nodule on FDG PET-CT 02/16/2023  -single solid 14 x 9 x 11 mm right thyroid nodule, TI-RADS 4, on thyroid ultrasound 03/10/2023  -FNA right thyroid nodule 03/23/2023: Benign thyroid nodule       Plan:  Re-stage with MRI with contrast of skull-base to clavicle and CT chest with contrast    Has lung metastases, proven with biopsy  Needs palliative systemic therapy  Expectant management for slow-growing disease, is also an option    Systemic palliative chemotherapy options:  -Cisplatin/Navelbine  -cisplatin/doxorubicin/cyclophosphamide (category 2B)  -paclitaxel (category 2A for non adenoid cystic carcinoma)  -carboplatin/Taxol  -carboplatin/gemcitabine    Useful in certain circumstances:   -androgen receptor therapy for AR pos tumors: Leuprolide; bicalutamide  -NTRK therapy for NTRK gene fusion pos tumors: Larotrectinib; entrectinib  -HER2 targeted therapy for HER2 pos tumors: Trastuzumab; ado trastuzumab emtansine; trastuzumab/pertuzumab; docetaxel/trastuzumab; Fam-trastuzumab deruxtecan (category 2B)  -axitinib, sorafenib: Category  2B  -lenvatinib (category 2B) for adenoid cystic carcinoma  -pembrolizumab (for microsatellite instability-high, i.e., MSI-H, MMR deficient, i.e., dMMR, TMB-H (=/> 10 mut/Mb)   -dabrafenib/trametinib for BRAF V600 E pos tumors  -Selpercatinib for RET gene fusion pos tumors  >>>  Will order NGS testing on biopsy of parotid gland or lung metastasis (to include, androgen receptor, NTRK gene fusion, HER2, MSI/MMR, TMB, BRAF V600 E, RET gene fusion)    To start with, will treat with carboplatin/Taxol   Regimen:  -paclitaxel 175 mg per m2 IV over 3 hours on day 1, followed by  -carboplatin AUC 5.5 IV over 30 minutes on day 1  Repeat cycle every 21 days until disease progression or unacceptable toxicity  Or,   -paclitaxel 200 mg per m2 IV over 3 hours on day 1, followed by  -carboplatin AUC 6 IV over 30 minutes on day 1   Repeat cycle every 21 days until disease progression or unacceptable toxicity    7/6/2023: A/P:   He has completed cycle 1 day 1 of paclitaxel/carboplatin.   He experienced some post treatment symptoms(heavy sweating, constipation, urinary incontinence, distortion of sense of taste. He attributes these to steroid use. States all resolved once off steroids.   Advised him some of the symptoms can be attributed to steroids, but some could be secondary to chemotherapy.   He has developed neutropenia following cycle 1. He denies any fever or symptoms of infections.   Recommend decreasing his Dexamethasone to 4 mg on days 2, 3, 4 see if it helps with taste changes, heavy sweating and urinary incontinence.   Constipation. Start taking senna one tablet daily. If not helping can increase to 2 tablets daily. Call office if he does not move his bowels in 3 days.     Start Cipro 500 mg BID x 7 days.   Infection precautions reviewed.   Add Neulasta; to be administered day 2 of each cycle starting on cycle # 2. He has difficult time with transportation, so will request onpro injector.  He has a pick line. Dressing  changed today.   He is scheduled for medi-port placement on 7/10/2023. Advised him surgeon's office will call him the day prior to let him know time of appointment. Lata (nurse navigator is working on setting up transportation or getting him approved for over night stay.   He will return to clinic on 7/10/23 for repeat CBC, CMP.   During chemotherapy, check CBC and CMP weekly(standing orders).   He will return to clinic in 2 week for MD visit, treatment(cycle # 2), images(done on 6/29/23), labs done prior.    Re-stage with imaging studies a couple of months after starting chemotherapy.   Patient advised to call office for any acute concerns(including but not limited to fever or infection) or questions. When he needs to seek ER care and why reviewed.     Above discussed at length with the patient.  All questions answered.    Discussed labs.   He understands and agrees with this plan.

## 2023-07-06 NOTE — Clinical Note
RTC 2 weeks with NP, treatment, labs prior  Needs repeat CBC on 7/10/2023 Has port placement on 7/10/23, trying to figure out transportation.  Will be adding Neupogen to his next cycle due to neutropenia following cycle # 1

## 2023-07-07 RX ORDER — IPRATROPIUM BROMIDE AND ALBUTEROL SULFATE 2.5; .5 MG/3ML; MG/3ML
3 SOLUTION RESPIRATORY (INHALATION) ONCE AS NEEDED
Status: CANCELLED | OUTPATIENT
Start: 2023-07-07 | End: 2034-12-03

## 2023-07-07 RX ORDER — PROCHLORPERAZINE EDISYLATE 5 MG/ML
5 INJECTION INTRAMUSCULAR; INTRAVENOUS ONCE AS NEEDED
Status: CANCELLED | OUTPATIENT
Start: 2023-07-07 | End: 2034-12-03

## 2023-07-07 RX ORDER — SODIUM CHLORIDE, SODIUM LACTATE, POTASSIUM CHLORIDE, CALCIUM CHLORIDE 600; 310; 30; 20 MG/100ML; MG/100ML; MG/100ML; MG/100ML
INJECTION, SOLUTION INTRAVENOUS CONTINUOUS
Status: CANCELLED | OUTPATIENT
Start: 2023-07-07

## 2023-07-07 RX ORDER — DIPHENHYDRAMINE HYDROCHLORIDE 50 MG/ML
25 INJECTION INTRAMUSCULAR; INTRAVENOUS ONCE AS NEEDED
Status: CANCELLED | OUTPATIENT
Start: 2023-07-07 | End: 2034-12-03

## 2023-07-07 RX ORDER — HYDROMORPHONE HYDROCHLORIDE 1 MG/ML
0.5 INJECTION, SOLUTION INTRAMUSCULAR; INTRAVENOUS; SUBCUTANEOUS EVERY 5 MIN PRN
Status: CANCELLED | OUTPATIENT
Start: 2023-07-07

## 2023-07-07 RX ORDER — HYDROMORPHONE HYDROCHLORIDE 1 MG/ML
0.2 INJECTION, SOLUTION INTRAMUSCULAR; INTRAVENOUS; SUBCUTANEOUS EVERY 5 MIN PRN
Status: CANCELLED | OUTPATIENT
Start: 2023-07-07

## 2023-07-07 RX ORDER — ONDANSETRON 2 MG/ML
4 INJECTION INTRAMUSCULAR; INTRAVENOUS ONCE
Status: CANCELLED | OUTPATIENT
Start: 2023-07-07 | End: 2023-07-07

## 2023-07-07 RX ORDER — MEPERIDINE HYDROCHLORIDE 25 MG/ML
12.5 INJECTION INTRAMUSCULAR; INTRAVENOUS; SUBCUTANEOUS ONCE
Status: CANCELLED | OUTPATIENT
Start: 2023-07-07 | End: 2023-07-07

## 2023-07-07 RX ORDER — OXYCODONE AND ACETAMINOPHEN 5; 325 MG/1; MG/1
2 TABLET ORAL ONCE
Status: CANCELLED | OUTPATIENT
Start: 2023-07-07 | End: 2023-07-07

## 2023-07-10 ENCOUNTER — INFUSION (OUTPATIENT)
Dept: INFUSION THERAPY | Facility: HOSPITAL | Age: 88
End: 2023-07-10
Attending: INTERNAL MEDICINE
Payer: MEDICARE

## 2023-07-10 ENCOUNTER — HOSPITAL ENCOUNTER (OUTPATIENT)
Facility: HOSPITAL | Age: 88
Discharge: HOME | End: 2023-07-10
Attending: SURGERY | Admitting: SURGERY
Payer: MEDICARE

## 2023-07-10 ENCOUNTER — TELEPHONE (OUTPATIENT)
Dept: HEMATOLOGY/ONCOLOGY | Facility: CLINIC | Age: 88
End: 2023-07-10
Payer: MEDICARE

## 2023-07-10 VITALS
BODY MASS INDEX: 23.16 KG/M2 | OXYGEN SATURATION: 93 % | HEART RATE: 88 BPM | DIASTOLIC BLOOD PRESSURE: 92 MMHG | SYSTOLIC BLOOD PRESSURE: 141 MMHG | WEIGHT: 168.19 LBS | TEMPERATURE: 98 F

## 2023-07-10 DIAGNOSIS — D70.1 CHEMOTHERAPY-INDUCED NEUTROPENIA: Primary | ICD-10-CM

## 2023-07-10 DIAGNOSIS — T45.1X5A CHEMOTHERAPY-INDUCED NEUTROPENIA: Primary | ICD-10-CM

## 2023-07-10 DIAGNOSIS — C07 ADENOCARCINOMA OF PAROTID GLAND: Primary | ICD-10-CM

## 2023-07-10 PROCEDURE — 96372 THER/PROPH/DIAG INJ SC/IM: CPT

## 2023-07-10 PROCEDURE — 63600175 PHARM REV CODE 636 W HCPCS: Performed by: NURSE PRACTITIONER

## 2023-07-10 RX ADMIN — FILGRASTIM-SNDZ 300 MCG: 300 INJECTION, SOLUTION INTRAVENOUS; SUBCUTANEOUS at 09:07

## 2023-07-10 NOTE — DISCHARGE INSTRUCTIONS
Your surgeon will contact your Oncologist regarding when your surgery will be rescheduled. Surgery clinic will contact you. Please return to the emergency department if you develop a fever, shortness breath, chest pain or any other concerning symptoms.   Surgery clinic telephone number is [phone].

## 2023-07-10 NOTE — NURSING
0940  Pt did labs and ANC is 60.  Orders noted for zarxio for 5 days but check labs again on day 3 to see if needs full number of days.  Pt instructed on SE of bone aches and told to take motrin and claritan.  Informed pt to wear a mask and stay away from crowds and sick people. Pt to go to ER if temp greater than 100.4 or feeling ill.  New appts given to pt.    
No

## 2023-07-10 NOTE — PROGRESS NOTES
Patient arrived, ambulatory for surgery this morning. His surgery has been cancelled by Dr. Stewart because of Lab results. He was unable to be contacted because he was already on his way to the hospital for his scheduled arrival time.  I escorted him to room 621 and he is seated on the side of the bed. I informed him of his surgery cancellation and that one of the surgeons will be up to speak with him. He is disappointed, but verbalized understanding.   Provided with water to drink per  his request. Call bell at his side. No questions at this time.

## 2023-07-11 ENCOUNTER — HOSPITAL ENCOUNTER (OUTPATIENT)
Dept: CARDIOLOGY | Facility: HOSPITAL | Age: 88
Discharge: HOME | End: 2023-07-11
Attending: FAMILY MEDICINE
Payer: MEDICARE

## 2023-07-11 ENCOUNTER — INFUSION (OUTPATIENT)
Dept: INFUSION THERAPY | Facility: HOSPITAL | Age: 88
End: 2023-07-11
Attending: INTERNAL MEDICINE
Payer: MEDICARE

## 2023-07-11 VITALS
SYSTOLIC BLOOD PRESSURE: 109 MMHG | WEIGHT: 165.38 LBS | TEMPERATURE: 98 F | DIASTOLIC BLOOD PRESSURE: 68 MMHG | OXYGEN SATURATION: 96 % | BODY MASS INDEX: 22.77 KG/M2 | HEART RATE: 75 BPM

## 2023-07-11 DIAGNOSIS — R91.8 LUNG MASS: ICD-10-CM

## 2023-07-11 DIAGNOSIS — C78.00 METASTASIS TO LUNG: ICD-10-CM

## 2023-07-11 DIAGNOSIS — T45.1X5A CHEMOTHERAPY-INDUCED NEUTROPENIA: Primary | ICD-10-CM

## 2023-07-11 DIAGNOSIS — D70.1 CHEMOTHERAPY-INDUCED NEUTROPENIA: Primary | ICD-10-CM

## 2023-07-11 DIAGNOSIS — R22.1 MASS OF NECK: ICD-10-CM

## 2023-07-11 DIAGNOSIS — I49.9 IRREGULAR HEART BEATS: Primary | ICD-10-CM

## 2023-07-11 DIAGNOSIS — I49.9 IRREGULAR HEART BEATS: ICD-10-CM

## 2023-07-11 PROCEDURE — 63600175 PHARM REV CODE 636 W HCPCS: Performed by: NURSE PRACTITIONER

## 2023-07-11 PROCEDURE — 93005 ELECTROCARDIOGRAM TRACING: CPT

## 2023-07-11 PROCEDURE — 96372 THER/PROPH/DIAG INJ SC/IM: CPT

## 2023-07-11 RX ADMIN — FILGRASTIM-SNDZ 300 MCG: 300 INJECTION, SOLUTION INTRAVENOUS; SUBCUTANEOUS at 12:07

## 2023-07-11 NOTE — NURSING
1150  Pt arrived for #2 zarxio injection.  Denies any pain or complaint.  After injection, pt ate lunch, then was escorted to cardiology for EKG ordered by his physician for irregular heartbeats.

## 2023-07-12 ENCOUNTER — INFUSION (OUTPATIENT)
Dept: INFUSION THERAPY | Facility: HOSPITAL | Age: 88
End: 2023-07-12
Attending: INTERNAL MEDICINE
Payer: MEDICARE

## 2023-07-12 ENCOUNTER — CLINICAL SUPPORT (OUTPATIENT)
Dept: HEMATOLOGY/ONCOLOGY | Facility: CLINIC | Age: 88
End: 2023-07-12
Payer: MEDICARE

## 2023-07-12 VITALS
RESPIRATION RATE: 20 BRPM | SYSTOLIC BLOOD PRESSURE: 117 MMHG | HEART RATE: 92 BPM | WEIGHT: 164.63 LBS | OXYGEN SATURATION: 97 % | BODY MASS INDEX: 22.66 KG/M2 | TEMPERATURE: 98 F | DIASTOLIC BLOOD PRESSURE: 65 MMHG

## 2023-07-12 DIAGNOSIS — Z85.828 HISTORY OF SQUAMOUS CELL CARCINOMA OF SKIN: ICD-10-CM

## 2023-07-12 DIAGNOSIS — C78.01 SECONDARY ADENOCARCINOMA OF RIGHT LUNG: ICD-10-CM

## 2023-07-12 DIAGNOSIS — C78.01 SECONDARY MALIGNANCY OF RIGHT LUNG: ICD-10-CM

## 2023-07-12 DIAGNOSIS — E04.1 THYROID NODULE: ICD-10-CM

## 2023-07-12 DIAGNOSIS — C79.2 CARCINOMATOUS METASTASIS IN SKIN: ICD-10-CM

## 2023-07-12 DIAGNOSIS — C07 CARCINOMA OF PAROTID GLAND: ICD-10-CM

## 2023-07-12 DIAGNOSIS — C07 ADENOCARCINOMA OF PAROTID GLAND: Primary | ICD-10-CM

## 2023-07-12 DIAGNOSIS — R91.8 LUNG NODULE, MULTIPLE: ICD-10-CM

## 2023-07-12 DIAGNOSIS — C07 ADENOCARCINOMA OF PAROTID GLAND: ICD-10-CM

## 2023-07-12 LAB
ABS NEUT CALC (OHS): 11.37 X10(3)/MCL (ref 2.1–9.2)
ALBUMIN SERPL-MCNC: 3.3 G/DL (ref 3.4–4.8)
ALBUMIN/GLOB SERPL: 1 RATIO (ref 1.1–2)
ALP SERPL-CCNC: 85 UNIT/L (ref 40–150)
ALT SERPL-CCNC: 48 UNIT/L (ref 0–55)
AST SERPL-CCNC: 36 UNIT/L (ref 5–34)
BASOPHILS NFR BLD MANUAL: 0.14 X10(3)/MCL (ref 0–0.2)
BASOPHILS NFR BLD MANUAL: 1 % (ref 0–2)
BILIRUBIN DIRECT+TOT PNL SERPL-MCNC: 0.4 MG/DL
BUN SERPL-MCNC: 23.2 MG/DL (ref 8.4–25.7)
CALCIUM SERPL-MCNC: 9 MG/DL (ref 8.8–10)
CHLORIDE SERPL-SCNC: 99 MMOL/L (ref 98–107)
CO2 SERPL-SCNC: 27 MMOL/L (ref 23–31)
CREAT SERPL-MCNC: 1.47 MG/DL (ref 0.73–1.18)
EOSINOPHIL NFR BLD MANUAL: 0.57 X10(3)/MCL (ref 0–0.9)
EOSINOPHIL NFR BLD MANUAL: 4 % (ref 0–8)
ERYTHROCYTE [DISTWIDTH] IN BLOOD BY AUTOMATED COUNT: 13 % (ref 11.5–17)
GFR SERPLBLD CREATININE-BSD FMLA CKD-EPI: 46 MLS/MIN/1.73/M2
GLOBULIN SER-MCNC: 3.4 GM/DL (ref 2.4–3.5)
GLUCOSE SERPL-MCNC: 90 MG/DL (ref 82–115)
HCT VFR BLD AUTO: 35.9 % (ref 42–52)
HGB BLD-MCNC: 11.9 G/DL (ref 14–18)
LYMPHOCYTES NFR BLD MANUAL: 1.71 X10(3)/MCL
LYMPHOCYTES NFR BLD MANUAL: 12 % (ref 13–40)
MAGNESIUM SERPL-MCNC: 2.8 MG/DL (ref 1.6–2.6)
MCH RBC QN AUTO: 31.3 PG (ref 27–31)
MCHC RBC AUTO-ENTMCNC: 33.1 G/DL (ref 33–36)
MCV RBC AUTO: 94.5 FL (ref 80–94)
MONOCYTES NFR BLD MANUAL: 0.43 X10(3)/MCL (ref 0.1–1.3)
MONOCYTES NFR BLD MANUAL: 3 % (ref 2–11)
NEUTROPHILS NFR BLD MANUAL: 70 % (ref 47–80)
NEUTS BAND NFR BLD MANUAL: 10 % (ref 0–11)
NRBC BLD AUTO-RTO: 0 %
PLATELET # BLD AUTO: 189 X10(3)/MCL (ref 130–400)
PLATELET # BLD EST: ADEQUATE 10*3/UL
PMV BLD AUTO: 9.1 FL (ref 7.4–10.4)
POTASSIUM SERPL-SCNC: 3.5 MMOL/L (ref 3.5–5.1)
PROT SERPL-MCNC: 6.7 GM/DL (ref 5.8–7.6)
RBC # BLD AUTO: 3.8 X10(6)/MCL (ref 4.7–6.1)
RBC MORPH BLD: NORMAL
SODIUM SERPL-SCNC: 136 MMOL/L (ref 136–145)
WBC # SPEC AUTO: 14.21 X10(3)/MCL (ref 4.5–11.5)

## 2023-07-12 PROCEDURE — 85027 COMPLETE CBC AUTOMATED: CPT

## 2023-07-12 PROCEDURE — 96523 IRRIG DRUG DELIVERY DEVICE: CPT

## 2023-07-12 PROCEDURE — 80053 COMPREHEN METABOLIC PANEL: CPT

## 2023-07-12 PROCEDURE — A4216 STERILE WATER/SALINE, 10 ML: HCPCS | Performed by: INTERNAL MEDICINE

## 2023-07-12 PROCEDURE — 25000003 PHARM REV CODE 250: Performed by: INTERNAL MEDICINE

## 2023-07-12 PROCEDURE — 83735 ASSAY OF MAGNESIUM: CPT

## 2023-07-12 PROCEDURE — 36415 COLL VENOUS BLD VENIPUNCTURE: CPT

## 2023-07-12 RX ORDER — HEPARIN 100 UNIT/ML
500 SYRINGE INTRAVENOUS
Status: DISCONTINUED | OUTPATIENT
Start: 2023-07-12 | End: 2023-07-12 | Stop reason: HOSPADM

## 2023-07-12 RX ORDER — HEPARIN 100 UNIT/ML
500 SYRINGE INTRAVENOUS
Status: CANCELLED | OUTPATIENT
Start: 2023-07-19

## 2023-07-12 RX ORDER — SODIUM CHLORIDE 0.9 % (FLUSH) 0.9 %
10 SYRINGE (ML) INJECTION
Status: COMPLETED | OUTPATIENT
Start: 2023-07-12 | End: 2023-07-12

## 2023-07-12 RX ORDER — SODIUM CHLORIDE 0.9 % (FLUSH) 0.9 %
10 SYRINGE (ML) INJECTION
Status: CANCELLED | OUTPATIENT
Start: 2023-07-19

## 2023-07-12 RX ADMIN — Medication 10 ML: at 08:07

## 2023-07-12 NOTE — NURSING
0850  Pt did labs and is here for PICC dressing change.  Small amt of dried blood noted at insertion site but no redness, swelling, and dressing was intact.  Sterile dressing changed.  Noted redness on outside of dressing.  Pt states he has been putting alcohol on it.  Instructed pt to stop b/c it is irritating the skin.  0930  Labs resulted Mag 2.8.  Pt denies taking any magnesium.  ANC today is 11,360.  Notified Dr. Kong and he responded that zarxio was no longer needed.  Pt to return next week for PICC dressing change and trx. Pt denies any pain.  Does report some constipation and weakness to lower legs. Pt ambulating on his own.

## 2023-07-12 NOTE — PROGRESS NOTES
Magnesium 2.8, elevated.      Please check with patient how he is doing.    If he is taking magnesium supplements, please ask him to stop.  Recheck magnesium level in a couple of days.

## 2023-07-17 ENCOUNTER — DOCUMENT SCAN (OUTPATIENT)
Dept: HOME HEALTH SERVICES | Facility: HOSPITAL | Age: 88
End: 2023-07-17
Payer: MEDICARE

## 2023-07-17 ENCOUNTER — EXTERNAL HOME HEALTH (OUTPATIENT)
Dept: HOME HEALTH SERVICES | Facility: HOSPITAL | Age: 88
End: 2023-07-17
Payer: MEDICARE

## 2023-07-17 DIAGNOSIS — C07 ADENOCARCINOMA OF PAROTID GLAND: Primary | ICD-10-CM

## 2023-07-17 RX ORDER — DIPHENHYDRAMINE HYDROCHLORIDE 50 MG/ML
50 INJECTION INTRAMUSCULAR; INTRAVENOUS ONCE AS NEEDED
OUTPATIENT
Start: 2023-08-09

## 2023-07-17 RX ORDER — FAMOTIDINE 10 MG/ML
20 INJECTION INTRAVENOUS
OUTPATIENT
Start: 2023-08-09

## 2023-07-17 RX ORDER — EPINEPHRINE 0.3 MG/.3ML
0.3 INJECTION SUBCUTANEOUS ONCE AS NEEDED
OUTPATIENT
Start: 2023-08-30

## 2023-07-17 RX ORDER — CEFAZOLIN SODIUM 2 G/50ML
2 SOLUTION INTRAVENOUS
Status: CANCELLED | OUTPATIENT
Start: 2023-07-17

## 2023-07-17 RX ORDER — HEPARIN 100 UNIT/ML
500 SYRINGE INTRAVENOUS
Status: CANCELLED | OUTPATIENT
Start: 2023-07-19

## 2023-07-17 RX ORDER — SODIUM CHLORIDE, SODIUM LACTATE, POTASSIUM CHLORIDE, CALCIUM CHLORIDE 600; 310; 30; 20 MG/100ML; MG/100ML; MG/100ML; MG/100ML
INJECTION, SOLUTION INTRAVENOUS CONTINUOUS
Status: CANCELLED | OUTPATIENT
Start: 2023-07-17

## 2023-07-17 RX ORDER — HEPARIN 100 UNIT/ML
500 SYRINGE INTRAVENOUS
OUTPATIENT
Start: 2023-08-30

## 2023-07-17 RX ORDER — ONDANSETRON 2 MG/ML
4 INJECTION INTRAMUSCULAR; INTRAVENOUS EVERY 6 HOURS PRN
Status: CANCELLED | OUTPATIENT
Start: 2023-07-17

## 2023-07-17 RX ORDER — DIPHENHYDRAMINE HYDROCHLORIDE 50 MG/ML
50 INJECTION INTRAMUSCULAR; INTRAVENOUS ONCE AS NEEDED
OUTPATIENT
Start: 2023-08-30

## 2023-07-17 RX ORDER — EPINEPHRINE 0.3 MG/.3ML
0.3 INJECTION SUBCUTANEOUS ONCE AS NEEDED
Status: CANCELLED | OUTPATIENT
Start: 2023-07-19

## 2023-07-17 RX ORDER — SODIUM CHLORIDE 0.9 % (FLUSH) 0.9 %
10 SYRINGE (ML) INJECTION
Status: CANCELLED | OUTPATIENT
Start: 2023-07-19

## 2023-07-17 RX ORDER — DIPHENHYDRAMINE HYDROCHLORIDE 50 MG/ML
50 INJECTION INTRAMUSCULAR; INTRAVENOUS
Start: 2023-08-09

## 2023-07-17 RX ORDER — SODIUM CHLORIDE 0.9 % (FLUSH) 0.9 %
10 SYRINGE (ML) INJECTION
OUTPATIENT
Start: 2023-08-09

## 2023-07-17 RX ORDER — DIPHENHYDRAMINE HYDROCHLORIDE 50 MG/ML
50 INJECTION INTRAMUSCULAR; INTRAVENOUS
Status: CANCELLED
Start: 2023-07-19

## 2023-07-17 RX ORDER — FAMOTIDINE 10 MG/ML
20 INJECTION INTRAVENOUS
OUTPATIENT
Start: 2023-08-30

## 2023-07-17 RX ORDER — FAMOTIDINE 10 MG/ML
20 INJECTION INTRAVENOUS
Status: CANCELLED | OUTPATIENT
Start: 2023-07-19

## 2023-07-17 RX ORDER — HEPARIN SODIUM 5000 [USP'U]/ML
5000 INJECTION, SOLUTION INTRAVENOUS; SUBCUTANEOUS ONCE
Status: CANCELLED | OUTPATIENT
Start: 2023-07-17

## 2023-07-17 RX ORDER — DIPHENHYDRAMINE HYDROCHLORIDE 50 MG/ML
50 INJECTION INTRAMUSCULAR; INTRAVENOUS ONCE AS NEEDED
Status: CANCELLED | OUTPATIENT
Start: 2023-07-19

## 2023-07-17 RX ORDER — DIPHENHYDRAMINE HYDROCHLORIDE 50 MG/ML
50 INJECTION INTRAMUSCULAR; INTRAVENOUS
Start: 2023-08-30

## 2023-07-17 RX ORDER — SODIUM CHLORIDE 0.9 % (FLUSH) 0.9 %
10 SYRINGE (ML) INJECTION
OUTPATIENT
Start: 2023-08-30

## 2023-07-17 RX ORDER — EPINEPHRINE 0.3 MG/.3ML
0.3 INJECTION SUBCUTANEOUS ONCE AS NEEDED
OUTPATIENT
Start: 2023-08-09

## 2023-07-17 RX ORDER — HEPARIN 100 UNIT/ML
500 SYRINGE INTRAVENOUS
OUTPATIENT
Start: 2023-08-09

## 2023-07-18 ENCOUNTER — OFFICE VISIT (OUTPATIENT)
Dept: HEMATOLOGY/ONCOLOGY | Facility: CLINIC | Age: 88
End: 2023-07-18
Attending: INTERNAL MEDICINE
Payer: MEDICARE

## 2023-07-18 ENCOUNTER — INFUSION (OUTPATIENT)
Dept: INFUSION THERAPY | Facility: HOSPITAL | Age: 88
End: 2023-07-18
Attending: INTERNAL MEDICINE
Payer: MEDICARE

## 2023-07-18 VITALS
RESPIRATION RATE: 20 BRPM | HEIGHT: 71 IN | HEART RATE: 78 BPM | DIASTOLIC BLOOD PRESSURE: 75 MMHG | TEMPERATURE: 98 F | BODY MASS INDEX: 23.1 KG/M2 | OXYGEN SATURATION: 99 % | WEIGHT: 165 LBS | SYSTOLIC BLOOD PRESSURE: 137 MMHG

## 2023-07-18 DIAGNOSIS — Z85.828 HISTORY OF SQUAMOUS CELL CARCINOMA OF SKIN: ICD-10-CM

## 2023-07-18 DIAGNOSIS — E04.1 THYROID NODULE: ICD-10-CM

## 2023-07-18 DIAGNOSIS — C07 ADENOCARCINOMA OF PAROTID GLAND: Primary | ICD-10-CM

## 2023-07-18 DIAGNOSIS — R79.89 ELEVATED SERUM CREATININE: Primary | ICD-10-CM

## 2023-07-18 DIAGNOSIS — C07 ADENOCARCINOMA OF PAROTID GLAND: ICD-10-CM

## 2023-07-18 DIAGNOSIS — C78.01 SECONDARY ADENOCARCINOMA OF RIGHT LUNG: ICD-10-CM

## 2023-07-18 DIAGNOSIS — C79.2 CARCINOMATOUS METASTASIS IN SKIN: ICD-10-CM

## 2023-07-18 DIAGNOSIS — R91.8 LUNG NODULE, MULTIPLE: ICD-10-CM

## 2023-07-18 PROCEDURE — 96413 CHEMO IV INFUSION 1 HR: CPT

## 2023-07-18 PROCEDURE — 63600175 PHARM REV CODE 636 W HCPCS: Performed by: INTERNAL MEDICINE

## 2023-07-18 PROCEDURE — 25000003 PHARM REV CODE 250: Performed by: INTERNAL MEDICINE

## 2023-07-18 PROCEDURE — 96417 CHEMO IV INFUS EACH ADDL SEQ: CPT

## 2023-07-18 PROCEDURE — 99215 PR OFFICE/OUTPT VISIT, EST, LEVL V, 40-54 MIN: ICD-10-PCS | Mod: S$PBB,,, | Performed by: INTERNAL MEDICINE

## 2023-07-18 PROCEDURE — 96415 CHEMO IV INFUSION ADDL HR: CPT

## 2023-07-18 PROCEDURE — 96375 TX/PRO/DX INJ NEW DRUG ADDON: CPT

## 2023-07-18 PROCEDURE — 99213 OFFICE O/P EST LOW 20 MIN: CPT | Mod: PBBFAC,25 | Performed by: INTERNAL MEDICINE

## 2023-07-18 PROCEDURE — 99215 OFFICE O/P EST HI 40 MIN: CPT | Mod: S$PBB,,, | Performed by: INTERNAL MEDICINE

## 2023-07-18 PROCEDURE — 96377 APPLICATON ON-BODY INJECTOR: CPT

## 2023-07-18 RX ORDER — DIPHENHYDRAMINE HYDROCHLORIDE 50 MG/ML
50 INJECTION INTRAMUSCULAR; INTRAVENOUS ONCE AS NEEDED
Status: DISCONTINUED | OUTPATIENT
Start: 2023-07-18 | End: 2023-07-18 | Stop reason: HOSPADM

## 2023-07-18 RX ORDER — HEPARIN 100 UNIT/ML
500 SYRINGE INTRAVENOUS
Status: DISCONTINUED | OUTPATIENT
Start: 2023-07-18 | End: 2023-07-18 | Stop reason: HOSPADM

## 2023-07-18 RX ORDER — DIPHENHYDRAMINE HYDROCHLORIDE 50 MG/ML
50 INJECTION INTRAMUSCULAR; INTRAVENOUS
Status: COMPLETED | OUTPATIENT
Start: 2023-07-18 | End: 2023-07-18

## 2023-07-18 RX ORDER — EPINEPHRINE 0.3 MG/.3ML
0.3 INJECTION SUBCUTANEOUS ONCE AS NEEDED
Status: DISCONTINUED | OUTPATIENT
Start: 2023-07-18 | End: 2023-07-18 | Stop reason: HOSPADM

## 2023-07-18 RX ORDER — FAMOTIDINE 10 MG/ML
20 INJECTION INTRAVENOUS
Status: COMPLETED | OUTPATIENT
Start: 2023-07-18 | End: 2023-07-18

## 2023-07-18 RX ORDER — SODIUM CHLORIDE 0.9 % (FLUSH) 0.9 %
10 SYRINGE (ML) INJECTION
Status: DISCONTINUED | OUTPATIENT
Start: 2023-07-18 | End: 2023-07-18 | Stop reason: HOSPADM

## 2023-07-18 RX ADMIN — PEGFILGRASTIM 6 MG: KIT SUBCUTANEOUS at 03:07

## 2023-07-18 RX ADMIN — PALONOSETRON HYDROCHLORIDE 0.25 MG: 0.25 INJECTION, SOLUTION INTRAVENOUS at 11:07

## 2023-07-18 RX ADMIN — DIPHENHYDRAMINE HYDROCHLORIDE 50 MG: 50 INJECTION, SOLUTION INTRAMUSCULAR; INTRAVENOUS at 10:07

## 2023-07-18 RX ADMIN — FAMOTIDINE 20 MG: 10 INJECTION, SOLUTION INTRAVENOUS at 10:07

## 2023-07-18 RX ADMIN — CARBOPLATIN 350 MG: 10 INJECTION INTRAVENOUS at 02:07

## 2023-07-18 RX ADMIN — PACLITAXEL 342 MG: 6 INJECTION, SOLUTION INTRAVENOUS at 11:07

## 2023-07-18 RX ADMIN — APREPITANT 130 MG: 130 INJECTION, EMULSION INTRAVENOUS at 10:07

## 2023-07-18 NOTE — Clinical Note
Orders for today:   From cycle 2 of chemotherapy onwards, patient should receive Neulasta Continue chemotherapy every 3 weeks Re-stage with contrast enhanced CT scans of chest and MRI scan of soft tissues of the neck with contrast late August/early September Check CBC and CMP weekly  Please order liquid testing for NGS  Follow-up with NP in 3 weeks.

## 2023-07-18 NOTE — PROGRESS NOTES
Reason for Visit:  Chemo treatment for poorly differentiated carcinoma of left parotid gland with lung mets    PMHx: SCCa involving left cheek, BPH      Height: 5'11:  Weight:   Wt Readings from Last 15 Encounters:   07/18/23 74.8 kg (165 lb)   07/12/23 74.7 kg (164 lb 9.6 oz)   07/11/23 75 kg (165 lb 6.4 oz)   07/10/23 76.3 kg (168 lb 3.4 oz)   07/06/23 76.8 kg (169 lb 5 oz)   07/06/23 76.8 kg (169 lb 5 oz)   06/28/23 75.8 kg (167 lb 1.7 oz)   06/27/23 74.8 kg (165 lb)   06/27/23 74.1 kg (163 lb 6.4 oz)   06/19/23 75.7 kg (166 lb 12.8 oz)   06/12/23 74.8 kg (165 lb)   05/31/23 73.6 kg (162 lb 3.2 oz)   05/25/23 71.1 kg (156 lb 12 oz)   05/10/23 74.4 kg (164 lb)   04/17/23 74.6 kg (164 lb 6.4 oz)       Usual BW: 175 lb > 1 year; most recent weight 165 lb  Weight Change: No significant wt changes  IBW:  172 lb  BMI: 23 (normal)    Allergies: Patient has no known allergies.    Current Medications:    Current Outpatient Medications:     dexAMETHasone (DECADRON) 4 MG Tab, Take 2 tablets(8 mg) once daily on days 2,3, and 4 of chemotherapy cycle., Disp: 24 tablet, Rfl: 1    ondansetron (ZOFRAN) 8 MG tablet, Take 1 tablet (8 mg total) by mouth every 8 (eight) hours as needed for Nausea (Nausea)., Disp: 30 tablet, Rfl: 2    tamsulosin (FLOMAX) 0.4 mg Cap, Take 1 capsule (0.4 mg total) by mouth every evening., Disp: 30 capsule, Rfl: 2    trimethoprim (TRIMPEX) 100 mg Tab, Take 100 mg by mouth once daily., Disp: , Rfl:       Labs:  7/18/23 -- H/H 11.4/34.4 L, Glu 92, Na 135 L, K 3.9, BUN 14.2, Cr 1.3, Alb 3.2, AST 77 H,  H    Diet History:   7/18/23 -- Pt reports good appetite eating 3 meals daily, reports cooks & prepares his own meals; denies difficulty chewing or swallowing; constipation managed with Senna with LBM reported by patient on 7/17 ; no significant weight changes noted, BMI normal; pt screens at low nutrition risk at this time    Nutrition Education:    Patient educated on:  Management of constipation,  Nutrition during cancer treatment .      Teaching Method:  Explanation and Printed Materials    Patient's Understanding: Verbalizes understanding    Barriers to learning: Hearing Deficit    Expected Compliance:  good    All questions were answered. Dietitian's contact information provided.         Intervention:  General healthy diet  Medical management of constipation  Consult RD as needed      Monitoring:  energy intake, weight, labs, and diet EDU needs

## 2023-07-18 NOTE — PLAN OF CARE
C2 Taxol/Carbo given via PICC tolerated well    Neulasta OBI applied to LUE    PICC dressing changed

## 2023-07-18 NOTE — PROGRESS NOTES
History:  Past Medical History:   Diagnosis Date    Skin cancer    Social history:  Single.  No children.  Lives in [place], [place].  Drinks 4 cans of beer over the weekend.  No tobacco, alcohol, or illicit drug abuse. He unfortunately has several social factors that may affect his care including the fact that he is of older age, lives alone, can not always hear his phone, and has no surroudning family or friends to help. He has a car that he can drive, but has no one to drive him to and from appointments where he may need sedation.    Family history:  Mother experience some kind of skin cancer in her 90s.  Several uncles had unknown kind of cancer.    Health maintenance:  Primary care provider in [place], [place].  Past Surgical History:   Procedure Laterality Date    BIOPSY OF THYROID Right       Social History     Socioeconomic History    Marital status: Single   Tobacco Use    Smoking status: Never     Passive exposure: Never    Smokeless tobacco: Never   Substance and Sexual Activity    Alcohol use: Yes     Alcohol/week: 3.0 standard drinks     Types: 3 Cans of beer per week     Comment: 3 cans of beer on the weekend    Drug use: Never    Sexual activity: Not Currently      History reviewed. No pertinent family history.     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, S/P WLE in 2016, S/P Mohs resection  -benign thyroid nodule right thyroid lobe      History of Present Illness:   Pain        Oncologic/Hematologic History:  Oncology History   Adenocarcinoma of parotid gland   5/25/2023 Cancer Staged    Staging form: Major Salivary Glands, AJCC 8th Edition  - Clinical stage from 5/25/2023: Stage IVC (cT4a, cN0, pM1)     6/10/2023  Initial Diagnosis    Adenocarcinoma of parotid gland     6/28/2023 -  Chemotherapy    Treatment Summary   Plan Name: OP PACLITAXEL CARBOPLATIN (AUC 5) Q3W  Treatment Goal: Palliative  Status: Active  Start Date: 6/28/2023  End Date: 5/29/2024 (Planned)  Provider: Tony Kong MD  Chemotherapy: CARBOplatin (PARAPLATIN) 320 mg in sodium chloride 0.9% 317 mL chemo infusion, 320 mg (100 % of original dose 321.6 mg), Intravenous, Clinic/HOD 1 time, 1 of 17 cycles  Dose modification:   (original dose 321.6 mg, Cycle 1)  Administration: 320 mg (6/28/2023)  PACLitaxeL (TAXOL) 175 mg/m2 = 342 mg in sodium chloride 0.9% 622 mL chemo infusion, 175 mg/m2 = 342 mg, Intravenous, Clinic/HOD 1 time, 1 of 17 cycles  Administration: 342 mg (6/28/2023)     87-year-old gentleman, referred from [facility], with metastatic adenocarcinoma of parotid gland, with lung metastases (proven with biopsy).      Erik Lundy is a 87 y.o. 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 Esquivel with Mohs resection.  He was recently referred to [facility] for evaluation of a left neck mass, present for a month.    FNA of left parotid mass resulted in poorly-differentiated carcinoma  Reported 7 lb weight loss over 2 months  Denied dysphagia, odynophagia, dysphonia, dyspnea, hematemesis, hemoptysis, focal weakness, new skin lesions or additional masses in head or neck area.    Physical exam by ENT on 05/31/2023: 3.5 cm firm mass involving left tail of parotid, fixed to the adjacent sternocleidomastoid muscle/masseter muscle as well as overlying skin; 3 new subcentimeter nodules fixed to the skin in the left postauricular skin just superior to this area; also a hypopigmented skin lesion inferior to the mass; no palpable lymphadenopathy; nontender; no thyroid nodules or goiter  Developed some numbness in the periauricular area on the left for 2-3 weeks    Imaging studies/biopsy reports as  below:      01/19/2023:  FNA without imaging, left superior level 5:   -pos for malignant cells; poorly-differentiated carcinoma (consistent with a poorly-differentiated carcinoma in which a high-grade primary parotid malignancy or metastatic carcinoma is in the differential)    -01/24/2023:  CT neck without contrast (evaluation of mass left side of neck):  1.9 x 2.0 x 2.4 cm mass left parotid gland  Multiple thyroid nodules measuring up to 1.2 cm  Small nodules left upper lung lobe met, measuring up to 2 mm    02/16/2023: FDG PET-CT (evaluation of poorly-differentiated carcinoma involving left parotid gland) (comparison:  Outside CT neck 01/24/2023):  1. Hypermetabolic left parotid nodule (1.9 x 2 cm, maximum SUV 12.8)  2. Bilateral hypermetabolic upper lobe pulmonary nodules (spiculated right upper lung lobe nodule 1.9 cm, maximum SUV 4.8; left upper lobe lung nodule 9 mm, maximum SUV 2.9)  3. Suspected hypermetabolic right thyroid lobe nodule.    03/10/2023: Ultrasound thyroid (evaluation of nontoxic single thyroid nodule):   Mostly cystic nodules in the right and left alyson thyroid with exception of a single solid slightly hypoechoic nodule 14 x 9 x 11 mm corresponding to a TI-RADS type 4 nodule    03/10/2023: CT chest with contrast (PET pos lung nodules, concerning for metastases from left parotid gland malignancy) (comparison:  PET-CT 02/16/2023):  Bilateral lung nodules overall similar to the prior examination.  Findings are consistent with metastatic disease.  (Spiculated nodule right upper lung lobe, 1.9 x 1.6 cm; parenchymal nodularity left upper lobe; spiculated nodule lingula, 9 mm x 9 mm; new atelectasis right lower lung lobe; scattered punctate 1-2 mm nodules right lower lobe; no significant mediastinal lymphadenopathy)  Thyroid nodules    03/10/2023: CT temporal bone with contrast (evaluation of left parotid gland malignant neoplasm):  Unremarkable temporal bone CT    03/10/2023: CT soft tissue of the  neck with contrast (evaluation of malignant neoplasm of parotid gland) (comparison:  PET-CT 02/16/2023):  2.5 cm left parotid tail mass; no cervical lymphadenopathy (left parotid tail mass 1.8 x 2.5 cm, abuts the skin surface; no definitive evidence of perineural spread)    03/23/2023:  FNA right thyroid nodule:  -cytomorphology of a benign thyroid nodule    05/25/2023:  Right upper lung lobe nodule, CT-guided needle core biopsy:  -scant fragments of-scant fragment of adenocarcinoma (history of poorly-differentiated carcinoma of parotid gland is noted; a metastasis is favored)    06/12/2023:   Presents for initial medical oncology consultation.  Pleasant gentleman.  In no acute discomfort.    Very hard of hearing.    Overall, feels well.  ECOG 1.  Some generalized weakness.  Some trouble swallowing.    Has left parotid gland tumor; mildly to moderately painful.  No bleeding or ulceration.  No unusual headaches or focal neurological symptoms.       OP PACLITAXEL CARBOPLATIN (AUC 5) Q3W     07/18/2023:   -monoclonal testing (cancer gene mutation panel by NGS) on right lung biopsy dated 05/25/2023:  Negative for ALK, RET, NTRK1/2/3, RNA gene fusions; nondiagnostic study (unable to amplified DNA); PD-L1 expression negative based on both TPS (< 1%) and CPS (<1); negative androgen receptor by IHC  -palliative chemotherapy with carboplatin/Taxol started 06/28/2023  -06/29/2023:  Baseline CT chest with contrast (comparison: CT chest 03/10/2023):   1. Improved right upper lung lobe spiculated nodule in size.   2. Improved left upper lung lobe previously present nodule.   3. New irregular defined nodule left lung lingular segment and minimal new punctate nodules left lower lung lobe.  -06/29/2023: Brain MRI with and without contrast:  No intracranial metastases  -07/06/2023 (8 days post cycle 1 of carboplatin/Taxol):  WBC 1.17 K. Hemoglobin 19.0.  Hematocrit 56.1.  Platelets 71 K. ANC 0.37 K. neutropenia treated with  filgrastim and prophylactic ciprofloxacin; Neulasta to be administered starting cycle 2 of chemotherapy  -[facility] scheduled for 07/28/2023  Labs reviewed.    Presents for a follow-up visit. Presbyacusis. In no acute discomfort.  Did not experience any significant side effects with cycle 1 of chemotherapy.  Did experience neutropenia but no neutropenic fevers.  Fair appetite.  No nausea or vomiting.  Left parotid gland tumor is already shrinking.  No chest pain, cough, dyspnea, hemoptysis, unusual headaches, focal neurological symptoms, abdominal pain, etc..  ECOG 0-1.      Medications:  Current Outpatient Medications on File Prior to Visit   Medication Sig Dispense Refill    dexAMETHasone (DECADRON) 4 MG Tab Take 2 tablets(8 mg) once daily on days 2,3, and 4 of chemotherapy cycle. 24 tablet 1    ondansetron (ZOFRAN) 8 MG tablet Take 1 tablet (8 mg total) by mouth every 8 (eight) hours as needed for Nausea (Nausea). 30 tablet 2    tamsulosin (FLOMAX) 0.4 mg Cap Take 1 capsule (0.4 mg total) by mouth every evening. 30 capsule 2    trimethoprim (TRIMPEX) 100 mg Tab Take 100 mg by mouth once daily.       Current Facility-Administered Medications on File Prior to Visit   Medication Dose Route Frequency Provider Last Rate Last Admin    sodium chloride 0.9% flush 10 mL  10 mL Intravenous PRN Compa Garcia MD           Review of Systems:   All systems reviewed and found to be negative except for the symptoms detailed above    Physical Examination:   VITAL SIGNS:   Vitals:    07/18/23 0853   BP: 137/75   Pulse: 78   Resp: 20   Temp: 97.7 °F (36.5 °C)       GENERAL:  In no apparent distress.    HEAD:  No signs of head trauma.  EYES:  Pupils are equal.  Extraocular motions intact.    EARS:  Hearing grossly intact.  MOUTH:  Oropharynx is normal.   NECK:  No adenopathy, no JVD.     CHEST:  Chest with clear breath sounds bilaterally.  No wheezes, rales, rhonchi.    CARDIAC:  Regular rate and rhythm.  S1 and S2, without  murmurs, gallops, rubs.  VASCULAR:  No Edema.  Peripheral pulses normal and equal in all extremities.  ABDOMEN:  Soft, without detectable tenderness.  No sign of distention.  No   rebound or guarding, and no masses palpated.   Bowel Sounds normal.  MUSCULOSKELETAL:  Good range of motion of all major joints. Extremities withouta clubbing, cyanosis or edema.    NEUROLOGIC EXAM:  Alert and oriented x 3.  No focal sensory or strength deficits.   Speech normal.  Follows commands.  PSYCHIATRIC:  Mood normal.  06/12/2023: In no acute discomfort.  Left parotid gland tumor is noted, mildly tender, approximately 4 cm x 4 cm, with a dark spot at the inferior aspect, without superficial ulceration or bleeding.    No results for input(s): CBC in the last 72 hours.   No results for input(s): CMP in the last 72 hours.     Assessment:  Problem List Items Addressed This Visit          Pulmonary    Lung nodule, multiple       Oncology    Carcinomatous metastasis in skin    Adenocarcinoma of parotid gland - Primary    Secondary adenocarcinoma of right lung    History of squamous cell carcinoma of skin       Endocrine    Thyroid nodule       Metastatic poorly-differentiated carcinoma/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:   -presentation:  Left neck mass for a month  -on physical exam by ENT 05/31/2023:  3.5 cm firm mass left tail of parotid, fixed to the adjacent sternocleidomastoid muscle/masseter muscle as well as overlying skin; 3 new subcentimeter nodules fixed to the skin in the left post auricular skin; hypopigmented skin lesion inferior to the mass as well  -FNA left superior level 5 mass 01/19/2023:  Poorly-differentiated carcinoma, consistent with a poorly-differentiated carcinoma in which a high-grade primary parotid malignancy or metastatic carcinoma is in the differential   -left parotid gland mass 1.9 x 2.0 x 2.4 cm on noncontrast CT neck 01/24/2023  -hypermetabolic left parotid nodule 1.9 x 2 cm on  FDG PET-CT 02/16/2023  -2.5 cm left parotid tail mass on CT neck with contrast 03/10/2023, abutting the skin  -no cervical lymphadenopathy  ----  -small nodules left upper lung lobe, up to 2 mm, on noncontrast CT neck 01/24/2023  -bilateral hypermetabolic upper lobe lung nodules on FDG PET-CT 02/16/2023, largest nodule right upper lung lobe 1.9 cm  Bilateral lung nodules, similar, on contrast-enhanced chest CT 03/10/2023 (largest nodule right upper lung lobe, 1.9 cm)  -right upper lung lobe lung lobe nodule CT-guided needle core biopsy 05/25/2023:  Adenocarcinoma (a metastases from poorly-differentiated carcinoma of parotid gland, is favored)  -----  -by virtue of skin involvement, cT4a  -cN0  -lung metastases, therefore, M1  >> cT4 a cN0 M1, stage IVC  -palliative chemotherapy with carboplatin/Taxol started 06/28/2023   -baseline chest CT with contrast and brain MRI with contrast 06/29/2023   -severe neutropenia, ANC 0.37 K, on 07/06/2023, 8 days post cycle 1 of chemotherapy; treated and resolved uneventfully  -07/18/2023:  Left parotid gland tumor is already shrinking both cycle 1 of chemotherapy; did not experience any side effects with chemotherapy; ECOG 0-1      Molecular testing (cancer gene mutation panel by NGS  (Performed on right lung biopsy dated 05/25/2023):  Negative for ALK, RET, NTRK1/2/3, RNA gene fusions;   nondiagnostic study (unable to amplified DNA);   PD-L1 expression negative based on both TPS (< 1%) and CPS (<1);   negative androgen receptor by IHC      Past 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 Esquivel), with Mohs resection      Benign thyroid nodule:  -suspected hypermetabolic right thyroid nodule on FDG PET-CT 02/16/2023  -single solid 14 x 9 x 11 mm right thyroid nodule, TI-RADS 4, on thyroid ultrasound 03/10/2023  -FNA right thyroid nodule 03/23/2023: Benign thyroid nodule         Plan:  Has lung metastases, proven with biopsy  Needs  palliative systemic therapy  Expectant management for slow-growing disease, is also an option  Palliative chemotherapy with carboplatin/Taxol every 3 weeks, started 06/28/2023  Severe neutropenia, ANC 0.37 K, 8 days post chemotherapy  >>>  Hematopoietic growth factor support with Neulasta from cycle 2 of chemotherapy   Chemotherapy every 3 weeks until disease progression or unacceptable toxicity  Re-stage with contrast enhanced CT scan of chest and MRI scan of soft tissues of the neck with contrast 2 months (late August/early September) after starting chemotherapy  Check CBC and CMP weekly    Systemic palliative chemotherapy options:  -Cisplatin/Navelbine  -cisplatin/doxorubicin/cyclophosphamide (category 2B)  -paclitaxel (category 2A for non adenoid cystic carcinoma)  -carboplatin/Taxol  -carboplatin/gemcitabine  >>>  We decided to treat him with carboplatin Taxol to start with    Carboplatin/Taxol every 3 weeks, options:  -paclitaxel 175 mg per m2 IV over 3 hours on day 1, followed by  -carboplatin AUC 5.5 IV over 30 minutes on day 1  Repeat cycle every 21 days until disease progression or unacceptable toxicity  Or,   -paclitaxel 200 mg per m2 IV over 3 hours on day 1, followed by  -carboplatin AUC 6 IV over 30 minutes on day 1   Repeat cycle every 21 days until disease progression or unacceptable toxicity    Useful in certain circumstances:   -androgen receptor therapy for AR pos tumors: Leuprolide; bicalutamide  -NTRK therapy for NTRK gene fusion pos tumors: Larotrectinib; entrectinib  -HER2 targeted therapy for HER2 pos tumors: Trastuzumab; ado trastuzumab emtansine; trastuzumab/pertuzumab; docetaxel/trastuzumab; Fam-trastuzumab deruxtecan (category 2B)  -axitinib, sorafenib: Category 2B  -lenvatinib (category 2B) for adenoid cystic carcinoma  -pembrolizumab (for microsatellite instability-high, i.e., MSI-H, MMR deficient, i.e., dMMR, TMB-H (=/> 10 mut/Mb)   -dabrafenib/trametinib for BRAF V600 E pos  tumors  -Selpercatinib for RET gene fusion pos tumors  >>>  Monoclonal testing (cancer gene mutation panel by NGS) on right lung biopsy dated 05/25/2023:  Negative for ALK, RET, NTRK1/2/3, RNA gene fusions;   nondiagnostic study (unable to amplified DNA);   PD-L1 expression negative based on both TPS (< 1%) and CPS (<1);   negative androgen receptor by IHC  >>>  Nondiagnostic study on tissue (lung metastasis)  Will order liquid testing    Follow-up with NP in 3 weeks.    Above discussed at length with the patient.  All questions answered.    Discussed labs, scans, pathology report, and staging of parotid gland cancer, and gave him copies of relevant reports.    He understands and agrees with this plan.  He also understands that his prognosis is guarded.    Follow-up:  No follow-ups on file.  a

## 2023-07-25 ENCOUNTER — INFUSION (OUTPATIENT)
Dept: INFUSION THERAPY | Facility: HOSPITAL | Age: 88
End: 2023-07-25
Attending: INTERNAL MEDICINE
Payer: MEDICARE

## 2023-07-25 VITALS
HEART RATE: 85 BPM | SYSTOLIC BLOOD PRESSURE: 128 MMHG | TEMPERATURE: 98 F | RESPIRATION RATE: 20 BRPM | OXYGEN SATURATION: 96 % | DIASTOLIC BLOOD PRESSURE: 75 MMHG

## 2023-07-25 DIAGNOSIS — C07 ADENOCARCINOMA OF PAROTID GLAND: Primary | ICD-10-CM

## 2023-07-25 DIAGNOSIS — C78.01 SECONDARY ADENOCARCINOMA OF RIGHT LUNG: ICD-10-CM

## 2023-07-25 DIAGNOSIS — C78.01 SECONDARY MALIGNANCY OF RIGHT LUNG: ICD-10-CM

## 2023-07-25 DIAGNOSIS — C07 PAROTID GLAND ADENOCARCINOMA: Primary | ICD-10-CM

## 2023-07-25 PROCEDURE — A4216 STERILE WATER/SALINE, 10 ML: HCPCS | Performed by: INTERNAL MEDICINE

## 2023-07-25 PROCEDURE — 96523 IRRIG DRUG DELIVERY DEVICE: CPT

## 2023-07-25 PROCEDURE — 25000003 PHARM REV CODE 250: Performed by: INTERNAL MEDICINE

## 2023-07-25 RX ORDER — SODIUM CHLORIDE 0.9 % (FLUSH) 0.9 %
10 SYRINGE (ML) INJECTION
Status: CANCELLED | OUTPATIENT
Start: 2023-07-26

## 2023-07-25 RX ORDER — HEPARIN 100 UNIT/ML
500 SYRINGE INTRAVENOUS
Status: DISCONTINUED | OUTPATIENT
Start: 2023-07-25 | End: 2023-07-25 | Stop reason: HOSPADM

## 2023-07-25 RX ORDER — SODIUM CHLORIDE 0.9 % (FLUSH) 0.9 %
10 SYRINGE (ML) INJECTION
Status: COMPLETED | OUTPATIENT
Start: 2023-07-25 | End: 2023-07-25

## 2023-07-25 RX ORDER — HEPARIN 100 UNIT/ML
500 SYRINGE INTRAVENOUS
Status: CANCELLED | OUTPATIENT
Start: 2023-07-26

## 2023-07-25 RX ADMIN — Medication 10 ML: at 11:07

## 2023-08-02 ENCOUNTER — CLINICAL SUPPORT (OUTPATIENT)
Dept: HEMATOLOGY/ONCOLOGY | Facility: CLINIC | Age: 88
End: 2023-08-02
Payer: MEDICARE

## 2023-08-02 ENCOUNTER — INFUSION (OUTPATIENT)
Dept: INFUSION THERAPY | Facility: HOSPITAL | Age: 88
End: 2023-08-02
Attending: INTERNAL MEDICINE
Payer: MEDICARE

## 2023-08-02 DIAGNOSIS — C07 ADENOCARCINOMA OF PAROTID GLAND: Primary | ICD-10-CM

## 2023-08-02 DIAGNOSIS — E87.6 HYPOKALEMIA: Primary | ICD-10-CM

## 2023-08-02 DIAGNOSIS — R79.89 ELEVATED SERUM CREATININE: ICD-10-CM

## 2023-08-02 DIAGNOSIS — C78.01 SECONDARY MALIGNANCY OF RIGHT LUNG: ICD-10-CM

## 2023-08-02 DIAGNOSIS — C78.01 SECONDARY ADENOCARCINOMA OF RIGHT LUNG: ICD-10-CM

## 2023-08-02 LAB
ALBUMIN SERPL-MCNC: 3.3 G/DL (ref 3.4–4.8)
ALBUMIN/GLOB SERPL: 1 RATIO (ref 1.1–2)
ALP SERPL-CCNC: 77 UNIT/L (ref 40–150)
ALT SERPL-CCNC: 52 UNIT/L (ref 0–55)
AST SERPL-CCNC: 61 UNIT/L (ref 5–34)
BASOPHILS # BLD AUTO: 0.02 X10(3)/MCL
BASOPHILS NFR BLD AUTO: 0.1 %
BILIRUBIN DIRECT+TOT PNL SERPL-MCNC: 0.4 MG/DL
BUN SERPL-MCNC: 34 MG/DL (ref 8.4–25.7)
CALCIUM SERPL-MCNC: 8.3 MG/DL (ref 8.8–10)
CHLORIDE SERPL-SCNC: 89 MMOL/L (ref 98–107)
CO2 SERPL-SCNC: 24 MMOL/L (ref 23–31)
CREAT SERPL-MCNC: 2.18 MG/DL (ref 0.73–1.18)
EOSINOPHIL # BLD AUTO: 0 X10(3)/MCL (ref 0–0.9)
EOSINOPHIL NFR BLD AUTO: 0 %
ERYTHROCYTE [DISTWIDTH] IN BLOOD BY AUTOMATED COUNT: 13.5 % (ref 11.5–17)
GFR SERPLBLD CREATININE-BSD FMLA CKD-EPI: 29 MLS/MIN/1.73/M2
GLOBULIN SER-MCNC: 3.3 GM/DL (ref 2.4–3.5)
GLUCOSE SERPL-MCNC: 115 MG/DL (ref 82–115)
HCT VFR BLD AUTO: 32.3 % (ref 42–52)
HGB BLD-MCNC: 11.4 G/DL (ref 14–18)
IMM GRANULOCYTES # BLD AUTO: 0.31 X10(3)/MCL (ref 0–0.04)
IMM GRANULOCYTES NFR BLD AUTO: 2 %
LYMPHOCYTES # BLD AUTO: 0.98 X10(3)/MCL (ref 0.6–4.6)
LYMPHOCYTES NFR BLD AUTO: 6.4 %
MAGNESIUM SERPL-MCNC: 1.9 MG/DL (ref 1.6–2.6)
MCH RBC QN AUTO: 31.6 PG (ref 27–31)
MCHC RBC AUTO-ENTMCNC: 35.3 G/DL (ref 33–36)
MCV RBC AUTO: 89.5 FL (ref 80–94)
MONOCYTES # BLD AUTO: 0.74 X10(3)/MCL (ref 0.1–1.3)
MONOCYTES NFR BLD AUTO: 4.9 %
NEUTROPHILS # BLD AUTO: 13.2 X10(3)/MCL (ref 2.1–9.2)
NEUTROPHILS NFR BLD AUTO: 86.6 %
NRBC BLD AUTO-RTO: 0 %
PLATELET # BLD AUTO: 121 X10(3)/MCL (ref 130–400)
PLATELETS.RETICULATED NFR BLD AUTO: 6 % (ref 0.9–11.2)
PMV BLD AUTO: 10 FL (ref 7.4–10.4)
POTASSIUM SERPL-SCNC: 3.4 MMOL/L (ref 3.5–5.1)
PROT SERPL-MCNC: 6.6 GM/DL (ref 5.8–7.6)
RBC # BLD AUTO: 3.61 X10(6)/MCL (ref 4.7–6.1)
SODIUM SERPL-SCNC: 126 MMOL/L (ref 136–145)
WBC # SPEC AUTO: 15.25 X10(3)/MCL (ref 4.5–11.5)

## 2023-08-02 PROCEDURE — 99211 OFF/OP EST MAY X REQ PHY/QHP: CPT | Mod: PBBFAC,25

## 2023-08-02 PROCEDURE — 25000003 PHARM REV CODE 250: Performed by: INTERNAL MEDICINE

## 2023-08-02 PROCEDURE — A4216 STERILE WATER/SALINE, 10 ML: HCPCS | Performed by: INTERNAL MEDICINE

## 2023-08-02 PROCEDURE — 80053 COMPREHEN METABOLIC PANEL: CPT

## 2023-08-02 PROCEDURE — 96360 HYDRATION IV INFUSION INIT: CPT

## 2023-08-02 PROCEDURE — 83735 ASSAY OF MAGNESIUM: CPT

## 2023-08-02 PROCEDURE — 36415 COLL VENOUS BLD VENIPUNCTURE: CPT

## 2023-08-02 PROCEDURE — 85025 COMPLETE CBC W/AUTO DIFF WBC: CPT

## 2023-08-02 PROCEDURE — 96523 IRRIG DRUG DELIVERY DEVICE: CPT

## 2023-08-02 RX ORDER — SODIUM CHLORIDE 0.9 % (FLUSH) 0.9 %
10 SYRINGE (ML) INJECTION
Status: COMPLETED | OUTPATIENT
Start: 2023-08-02 | End: 2023-08-02

## 2023-08-02 RX ORDER — SODIUM CHLORIDE 0.9 % (FLUSH) 0.9 %
10 SYRINGE (ML) INJECTION
Status: CANCELLED | OUTPATIENT
Start: 2023-08-09

## 2023-08-02 RX ORDER — HEPARIN 100 UNIT/ML
500 SYRINGE INTRAVENOUS
Status: CANCELLED | OUTPATIENT
Start: 2023-08-09

## 2023-08-02 RX ORDER — POTASSIUM CHLORIDE 20 MEQ/1
40 TABLET, EXTENDED RELEASE ORAL DAILY
Qty: 28 TABLET | Refills: 0 | Status: SHIPPED | OUTPATIENT
Start: 2023-08-02 | End: 2023-08-16

## 2023-08-02 RX ORDER — SODIUM CHLORIDE 0.9 % (FLUSH) 0.9 %
10 SYRINGE (ML) INJECTION
OUTPATIENT
Start: 2023-08-02

## 2023-08-02 RX ORDER — SODIUM CHLORIDE 0.9 % (FLUSH) 0.9 %
10 SYRINGE (ML) INJECTION
Status: CANCELLED | OUTPATIENT
Start: 2023-08-02

## 2023-08-02 RX ORDER — HEPARIN 100 UNIT/ML
500 SYRINGE INTRAVENOUS
Status: CANCELLED | OUTPATIENT
Start: 2023-08-02

## 2023-08-02 RX ORDER — HEPARIN 100 UNIT/ML
500 SYRINGE INTRAVENOUS
OUTPATIENT
Start: 2023-08-02

## 2023-08-02 RX ADMIN — Medication 10 ML: at 02:08

## 2023-08-02 RX ADMIN — SODIUM CHLORIDE 1000 ML: 9 INJECTION, SOLUTION INTRAVENOUS at 02:08

## 2023-08-02 NOTE — NURSING
1867 Patient is here for labs & PICC dressing change. Abrasions observed to left hand fingers noted. Patient [place] post office.Abrasions cleaned w/ saline & bandaids applied. Labs reviewed w/ Dr. Knog. Potassium 3.4, creatinine 2.18, na 126. New orders to check magnesium level, administer 1 L NS and rx for po potassium was called in to pharmacy.

## 2023-08-04 DIAGNOSIS — C07 ADENOCARCINOMA OF PAROTID GLAND: Primary | ICD-10-CM

## 2023-08-07 DIAGNOSIS — C07 ADENOCARCINOMA OF PAROTID GLAND: Primary | ICD-10-CM

## 2023-08-08 ENCOUNTER — TELEPHONE (OUTPATIENT)
Dept: HEMATOLOGY/ONCOLOGY | Facility: CLINIC | Age: 88
End: 2023-08-08
Payer: MEDICARE

## 2023-08-08 ENCOUNTER — DOCUMENTATION ONLY (OUTPATIENT)
Dept: HEMATOLOGY/ONCOLOGY | Facility: CLINIC | Age: 88
End: 2023-08-08
Payer: MEDICARE

## 2023-08-08 ENCOUNTER — HOSPITAL ENCOUNTER (OUTPATIENT)
Facility: HOSPITAL | Age: 88
Discharge: HOME | End: 2023-08-09
Attending: STUDENT IN AN ORGANIZED HEALTH CARE EDUCATION/TRAINING PROGRAM | Admitting: SURGERY
Payer: MEDICARE

## 2023-08-08 DIAGNOSIS — R55 NEAR SYNCOPE: ICD-10-CM

## 2023-08-08 DIAGNOSIS — R29.6 RECURRENT FALLS: Primary | ICD-10-CM

## 2023-08-08 DIAGNOSIS — S06.5XAA SDH (SUBDURAL HEMATOMA): ICD-10-CM

## 2023-08-08 LAB
ALBUMIN SERPL-MCNC: 3.2 G/DL (ref 3.4–4.8)
ALBUMIN/GLOB SERPL: 1.2 RATIO (ref 1.1–2)
ALP SERPL-CCNC: 99 UNIT/L (ref 40–150)
ALT SERPL-CCNC: 47 UNIT/L (ref 0–55)
AST SERPL-CCNC: 34 UNIT/L (ref 5–34)
BASOPHILS # BLD AUTO: 0.03 X10(3)/MCL
BASOPHILS NFR BLD AUTO: 0.3 %
BILIRUB SERPL-MCNC: 0.7 MG/DL
BUN SERPL-MCNC: 17.3 MG/DL (ref 8.4–25.7)
CALCIUM SERPL-MCNC: 8.7 MG/DL (ref 8.8–10)
CHLORIDE SERPL-SCNC: 94 MMOL/L (ref 98–107)
CO2 SERPL-SCNC: 27 MMOL/L (ref 23–31)
CREAT SERPL-MCNC: 1.42 MG/DL (ref 0.73–1.18)
EOSINOPHIL # BLD AUTO: 0.01 X10(3)/MCL (ref 0–0.9)
EOSINOPHIL NFR BLD AUTO: 0.1 %
ERYTHROCYTE [DISTWIDTH] IN BLOOD BY AUTOMATED COUNT: 14.2 % (ref 11.5–17)
GFR SERPLBLD CREATININE-BSD FMLA CKD-EPI: 48 MLS/MIN/1.73/M2
GLOBULIN SER-MCNC: 2.7 GM/DL (ref 2.4–3.5)
GLUCOSE SERPL-MCNC: 128 MG/DL (ref 82–115)
HCT VFR BLD AUTO: 32.9 % (ref 42–52)
HGB BLD-MCNC: 11.4 G/DL (ref 14–18)
IMM GRANULOCYTES # BLD AUTO: 0.12 X10(3)/MCL (ref 0–0.04)
IMM GRANULOCYTES NFR BLD AUTO: 1.1 %
INR PPP: 1.2
LYMPHOCYTES # BLD AUTO: 1.01 X10(3)/MCL (ref 0.6–4.6)
LYMPHOCYTES NFR BLD AUTO: 9.4 %
MCH RBC QN AUTO: 31.5 PG (ref 27–31)
MCHC RBC AUTO-ENTMCNC: 34.7 G/DL (ref 33–36)
MCV RBC AUTO: 90.9 FL (ref 80–94)
MONOCYTES # BLD AUTO: 0.98 X10(3)/MCL (ref 0.1–1.3)
MONOCYTES NFR BLD AUTO: 9.1 %
NEUTROPHILS # BLD AUTO: 8.57 X10(3)/MCL (ref 2.1–9.2)
NEUTROPHILS NFR BLD AUTO: 80 %
NRBC BLD AUTO-RTO: 0 %
PLATELET # BLD AUTO: 317 X10(3)/MCL (ref 130–400)
PMV BLD AUTO: 9.1 FL (ref 7.4–10.4)
POTASSIUM SERPL-SCNC: 4.9 MMOL/L (ref 3.5–5.1)
PROT SERPL-MCNC: 5.9 GM/DL (ref 5.8–7.6)
PROTHROMBIN TIME: 14.8 SECONDS (ref 12.5–14.5)
RBC # BLD AUTO: 3.62 X10(6)/MCL (ref 4.7–6.1)
SODIUM SERPL-SCNC: 130 MMOL/L (ref 136–145)
WBC # SPEC AUTO: 10.72 X10(3)/MCL (ref 4.5–11.5)

## 2023-08-08 PROCEDURE — 99291 CRITICAL CARE FIRST HOUR: CPT

## 2023-08-08 PROCEDURE — G0378 HOSPITAL OBSERVATION PER HR: HCPCS

## 2023-08-08 PROCEDURE — 85025 COMPLETE CBC W/AUTO DIFF WBC: CPT | Performed by: STUDENT IN AN ORGANIZED HEALTH CARE EDUCATION/TRAINING PROGRAM

## 2023-08-08 PROCEDURE — 85610 PROTHROMBIN TIME: CPT | Performed by: STUDENT IN AN ORGANIZED HEALTH CARE EDUCATION/TRAINING PROGRAM

## 2023-08-08 PROCEDURE — 80053 COMPREHEN METABOLIC PANEL: CPT | Performed by: STUDENT IN AN ORGANIZED HEALTH CARE EDUCATION/TRAINING PROGRAM

## 2023-08-08 PROCEDURE — 96361 HYDRATE IV INFUSION ADD-ON: CPT

## 2023-08-08 PROCEDURE — 96360 HYDRATION IV INFUSION INIT: CPT

## 2023-08-08 PROCEDURE — 25000003 PHARM REV CODE 250: Performed by: NURSE PRACTITIONER

## 2023-08-08 RX ORDER — METHOCARBAMOL 500 MG/1
500 TABLET, FILM COATED ORAL EVERY 8 HOURS PRN
Status: DISCONTINUED | OUTPATIENT
Start: 2023-08-08 | End: 2023-08-09 | Stop reason: HOSPADM

## 2023-08-08 RX ORDER — LEVETIRACETAM 500 MG/1
500 TABLET ORAL 2 TIMES DAILY
Status: DISCONTINUED | OUTPATIENT
Start: 2023-08-08 | End: 2023-08-09 | Stop reason: HOSPADM

## 2023-08-08 RX ORDER — MORPHINE SULFATE 4 MG/ML
2 INJECTION, SOLUTION INTRAMUSCULAR; INTRAVENOUS
Status: DISCONTINUED | OUTPATIENT
Start: 2023-08-08 | End: 2023-08-09 | Stop reason: HOSPADM

## 2023-08-08 RX ORDER — FAMOTIDINE 20 MG/1
20 TABLET, FILM COATED ORAL DAILY
Status: DISCONTINUED | OUTPATIENT
Start: 2023-08-09 | End: 2023-08-09

## 2023-08-08 RX ORDER — OXYCODONE HYDROCHLORIDE 5 MG/1
5 TABLET ORAL EVERY 4 HOURS PRN
Status: DISCONTINUED | OUTPATIENT
Start: 2023-08-08 | End: 2023-08-09 | Stop reason: HOSPADM

## 2023-08-08 RX ORDER — POLYETHYLENE GLYCOL 3350 17 G/17G
17 POWDER, FOR SOLUTION ORAL 2 TIMES DAILY
Status: DISCONTINUED | OUTPATIENT
Start: 2023-08-08 | End: 2023-08-09 | Stop reason: HOSPADM

## 2023-08-08 RX ORDER — SODIUM CHLORIDE 9 MG/ML
INJECTION, SOLUTION INTRAVENOUS CONTINUOUS
Status: DISCONTINUED | OUTPATIENT
Start: 2023-08-08 | End: 2023-08-09

## 2023-08-08 RX ORDER — DOCUSATE SODIUM 100 MG/1
100 CAPSULE, LIQUID FILLED ORAL 2 TIMES DAILY
Status: DISCONTINUED | OUTPATIENT
Start: 2023-08-08 | End: 2023-08-09 | Stop reason: HOSPADM

## 2023-08-08 RX ORDER — BISACODYL 10 MG
10 SUPPOSITORY, RECTAL RECTAL DAILY PRN
Status: DISCONTINUED | OUTPATIENT
Start: 2023-08-08 | End: 2023-08-09 | Stop reason: HOSPADM

## 2023-08-08 RX ORDER — TALC
6 POWDER (GRAM) TOPICAL NIGHTLY PRN
Status: DISCONTINUED | OUTPATIENT
Start: 2023-08-08 | End: 2023-08-09 | Stop reason: HOSPADM

## 2023-08-08 RX ADMIN — DOCUSATE SODIUM 100 MG: 100 CAPSULE, LIQUID FILLED ORAL at 09:08

## 2023-08-08 RX ADMIN — SODIUM CHLORIDE: 9 INJECTION, SOLUTION INTRAVENOUS at 09:08

## 2023-08-08 RX ADMIN — LEVETIRACETAM 500 MG: 500 TABLET, FILM COATED ORAL at 09:08

## 2023-08-08 RX ADMIN — POLYETHYLENE GLYCOL 3350 17 G: 17 POWDER, FOR SOLUTION ORAL at 09:08

## 2023-08-08 NOTE — NURSING
PADMINI Blair received report from MAXIMILIAN Chaves RN that patient had arrived for scheduled appointment today appearing disheveled and very weak. Patient transported to emergency department for medical evaluation.   PADMINI Blair contacted Kendra at [facility] for update of patient's status. Kendra reports that patient was seen by home health nurse on Monday 8/7/23 with blood pressure of 70/48, pulse 112, temp 99.1; patient reporting he has had falls in the home. Reports that the nurse directed patient to go to ER to be evaluated. Kendra says that the home health nurse reports patient does have a working AC but keeps it warm in the home. Patient does have appropriate running water. Patient has had home health SW visits to evaluate safety and patient needs.  PADMINI Blair addressed with home health the issue of patient's self-neglect and if a report to [employer] is needed. Kendra states that patient is in his right mind but there are concerns about his falls in the home. Home health nurse is scheduled to see patient again on Thursday 8/10/23.  PADMINI Blair contacted CHERRIE Prakash in outpatient CM to inform of above and discuss options. CHERRIE Prakash feels that [employer] may need to be called to evaluate patient's situation. CHERRIE Prakash will notify ER .   Will follow up with Kendra at [facility] pending outcome of ER visit.

## 2023-08-08 NOTE — TELEPHONE ENCOUNTER
Patient to follow up with  at next visit for further evaluation to see if continuing chemotherapy is in his best interest. Vit D level. 23  Continue Vit D 2000 U po daily.

## 2023-08-08 NOTE — PROGRESS NOTES
PADMINI Blair received report from MAXIMILIAN Chaves RN that patient had arrived for scheduled appointment today appearing disheveled and very weak. Patient transported to emergency department for medical evaluation.   PADMINI Blair contacted Kendra at [facility] for update of patient's status. Kendra reports that patient was seen by home health nurse on Monday 8/7/23 with blood pressure of 70/48, pulse 112, temp 99.1; patient reporting he has had falls in the home. Reports that the nurse directed patient to go to ER to be evaluated. Kendra says that the home health nurse reports patient does have a working AC but keeps it warm in the home. Patient does have appropriate running water. Patient has had home health SW visits to evaluate safety and patient needs.  PADMINI Blair addressed with home health the issue of patient's self-neglect and if a report to [employer] is needed. Kendra states that patient is in his right mind but there are concerns about his falls in the home. Home health nurse is scheduled to see patient again on Thursday 8/10/23.  PADMINI Blair contacted CHERRIE Prakash in outpatient CM to inform of above and discuss options. CHERRIE Prakash feels that [employer] may need to be called to evaluate patient's situation. CHERRIE Prakash will notify ER .   Will follow up with Kendra at [facility] pending outcome of ER visit.     >>>    In light of above, we have decided to hold chemotherapy for now.

## 2023-08-09 VITALS
WEIGHT: 154 LBS | RESPIRATION RATE: 18 BRPM | SYSTOLIC BLOOD PRESSURE: 104 MMHG | HEIGHT: 72 IN | TEMPERATURE: 99 F | BODY MASS INDEX: 20.86 KG/M2 | OXYGEN SATURATION: 96 % | HEART RATE: 79 BPM | DIASTOLIC BLOOD PRESSURE: 56 MMHG

## 2023-08-09 PROBLEM — S06.5XAA SDH (SUBDURAL HEMATOMA): Status: ACTIVE | Noted: 2023-08-09

## 2023-08-09 LAB
ALBUMIN SERPL-MCNC: 3.1 G/DL (ref 3.4–4.8)
ALBUMIN/GLOB SERPL: 1.3 RATIO (ref 1.1–2)
ALP SERPL-CCNC: 96 UNIT/L (ref 40–150)
ALT SERPL-CCNC: 45 UNIT/L (ref 0–55)
AST SERPL-CCNC: 31 UNIT/L (ref 5–34)
AV INDEX (PROSTH): 1.1
AV MEAN GRADIENT: 2 MMHG
AV PEAK GRADIENT: 4 MMHG
AV VALVE AREA BY VELOCITY RATIO: 3.63 CM²
AV VALVE AREA: 3.82 CM²
AV VELOCITY RATIO: 1.05
BASOPHILS # BLD AUTO: 0.04 X10(3)/MCL
BASOPHILS NFR BLD AUTO: 0.4 %
BILIRUB SERPL-MCNC: 0.7 MG/DL
BSA FOR ECHO PROCEDURE: 1.88 M2
BUN SERPL-MCNC: 14.9 MG/DL (ref 8.4–25.7)
CALCIUM SERPL-MCNC: 8.6 MG/DL (ref 8.8–10)
CHLORIDE SERPL-SCNC: 99 MMOL/L (ref 98–107)
CO2 SERPL-SCNC: 26 MMOL/L (ref 23–31)
CREAT SERPL-MCNC: 1.33 MG/DL (ref 0.73–1.18)
CV ECHO LV RWT: 0.42 CM
DOP CALC AO PEAK VEL: 1.01 M/S
DOP CALC AO VTI: 17.2 CM
DOP CALC LVOT AREA: 3.5 CM2
DOP CALC LVOT DIAMETER: 2.1 CM
DOP CALC LVOT PEAK VEL: 1.06 M/S
DOP CALC LVOT STROKE VOLUME: 65.78 CM3
DOP CALC MV VTI: 20.4 CM
DOP CALCLVOT PEAK VEL VTI: 19 CM
E WAVE DECELERATION TIME: 192 MSEC
E/A RATIO: 0.7
E/E' RATIO: 8.46 M/S
ECHO LV POSTERIOR WALL: 0.93 CM (ref 0.6–1.1)
EOSINOPHIL # BLD AUTO: 0.01 X10(3)/MCL (ref 0–0.9)
EOSINOPHIL NFR BLD AUTO: 0.1 %
ERYTHROCYTE [DISTWIDTH] IN BLOOD BY AUTOMATED COUNT: 14.3 % (ref 11.5–17)
FRACTIONAL SHORTENING: 29 % (ref 28–44)
GFR SERPLBLD CREATININE-BSD FMLA CKD-EPI: 52 MLS/MIN/1.73/M2
GLOBULIN SER-MCNC: 2.4 GM/DL (ref 2.4–3.5)
GLUCOSE SERPL-MCNC: 88 MG/DL (ref 82–115)
HCT VFR BLD AUTO: 31.2 % (ref 42–52)
HGB BLD-MCNC: 10.8 G/DL (ref 14–18)
IMM GRANULOCYTES # BLD AUTO: 0.1 X10(3)/MCL (ref 0–0.04)
IMM GRANULOCYTES NFR BLD AUTO: 1 %
INTERVENTRICULAR SEPTUM: 0.91 CM (ref 0.6–1.1)
LEFT ATRIUM SIZE: 3.2 CM
LEFT ATRIUM VOLUME INDEX MOD: 9.9 ML/M2
LEFT ATRIUM VOLUME MOD: 19 CM3
LEFT INTERNAL DIMENSION IN SYSTOLE: 3.13 CM (ref 2.1–4)
LEFT VENTRICLE DIASTOLIC VOLUME INDEX: 46.18 ML/M2
LEFT VENTRICLE DIASTOLIC VOLUME: 88.2 ML
LEFT VENTRICLE MASS INDEX: 69 G/M2
LEFT VENTRICLE SYSTOLIC VOLUME INDEX: 20.3 ML/M2
LEFT VENTRICLE SYSTOLIC VOLUME: 38.8 ML
LEFT VENTRICULAR INTERNAL DIMENSION IN DIASTOLE: 4.41 CM (ref 3.5–6)
LEFT VENTRICULAR MASS: 132.37 G
LV LATERAL E/E' RATIO: 7.86 M/S
LV SEPTAL E/E' RATIO: 9.17 M/S
LVOT MG: 3 MMHG
LVOT MV: 0.72 CM/S
LYMPHOCYTES # BLD AUTO: 1.45 X10(3)/MCL (ref 0.6–4.6)
LYMPHOCYTES NFR BLD AUTO: 13.9 %
MCH RBC QN AUTO: 31 PG (ref 27–31)
MCHC RBC AUTO-ENTMCNC: 34.6 G/DL (ref 33–36)
MCV RBC AUTO: 89.7 FL (ref 80–94)
MONOCYTES # BLD AUTO: 1.01 X10(3)/MCL (ref 0.1–1.3)
MONOCYTES NFR BLD AUTO: 9.7 %
MV MEAN GRADIENT: 2 MMHG
MV PEAK A VEL: 0.79 M/S
MV PEAK E VEL: 0.55 M/S
MV PEAK GRADIENT: 3 MMHG
MV STENOSIS PRESSURE HALF TIME: 60 MS
MV VALVE AREA BY CONTINUITY EQUATION: 3.22 CM2
MV VALVE AREA P 1/2 METHOD: 3.67 CM2
NEUTROPHILS # BLD AUTO: 7.79 X10(3)/MCL (ref 2.1–9.2)
NEUTROPHILS NFR BLD AUTO: 74.9 %
NRBC BLD AUTO-RTO: 0 %
OHS LV EJECTION FRACTION SIMPSONS BIPLANE MOD: 6 %
PISA TR MAX VEL: 2.13 M/S
PLATELET # BLD AUTO: 293 X10(3)/MCL (ref 130–400)
PMV BLD AUTO: 8.9 FL (ref 7.4–10.4)
POTASSIUM SERPL-SCNC: 4.8 MMOL/L (ref 3.5–5.1)
PROT SERPL-MCNC: 5.5 GM/DL (ref 5.8–7.6)
RBC # BLD AUTO: 3.48 X10(6)/MCL (ref 4.7–6.1)
RIGHT VENTRICULAR END-DIASTOLIC DIMENSION: 1.41 CM
SODIUM SERPL-SCNC: 132 MMOL/L (ref 136–145)
TDI LATERAL: 0.07 M/S
TDI SEPTAL: 0.06 M/S
TDI: 0.07 M/S
TR MAX PG: 18 MMHG
TRICUSPID ANNULAR PLANE SYSTOLIC EXCURSION: 1.82 CM
WBC # SPEC AUTO: 10.4 X10(3)/MCL (ref 4.5–11.5)
Z-SCORE OF LEFT VENTRICULAR DIMENSION IN END DIASTOLE: -1.98
Z-SCORE OF LEFT VENTRICULAR DIMENSION IN END SYSTOLE: -0.44

## 2023-08-09 PROCEDURE — 99205 PR OFFICE/OUTPT VISIT, NEW, LEVL V, 60-74 MIN: ICD-10-PCS | Mod: ,,, | Performed by: NEUROLOGICAL SURGERY

## 2023-08-09 PROCEDURE — 97162 PT EVAL MOD COMPLEX 30 MIN: CPT

## 2023-08-09 PROCEDURE — 25000003 PHARM REV CODE 250: Performed by: NURSE PRACTITIONER

## 2023-08-09 PROCEDURE — G0378 HOSPITAL OBSERVATION PER HR: HCPCS

## 2023-08-09 PROCEDURE — 63600175 PHARM REV CODE 636 W HCPCS: Performed by: NURSE PRACTITIONER

## 2023-08-09 PROCEDURE — 99205 OFFICE O/P NEW HI 60 MIN: CPT | Mod: ,,, | Performed by: NEUROLOGICAL SURGERY

## 2023-08-09 PROCEDURE — 97166 OT EVAL MOD COMPLEX 45 MIN: CPT

## 2023-08-09 PROCEDURE — 96372 THER/PROPH/DIAG INJ SC/IM: CPT | Performed by: NURSE PRACTITIONER

## 2023-08-09 PROCEDURE — 85025 COMPLETE CBC W/AUTO DIFF WBC: CPT | Performed by: NURSE PRACTITIONER

## 2023-08-09 PROCEDURE — 80053 COMPREHEN METABOLIC PANEL: CPT | Performed by: NURSE PRACTITIONER

## 2023-08-09 PROCEDURE — 94799 UNLISTED PULMONARY SVC/PX: CPT

## 2023-08-09 PROCEDURE — 99900031 HC PATIENT EDUCATION (STAT)

## 2023-08-09 PROCEDURE — 94761 N-INVAS EAR/PLS OXIMETRY MLT: CPT

## 2023-08-09 PROCEDURE — 96361 HYDRATE IV INFUSION ADD-ON: CPT

## 2023-08-09 RX ORDER — POTASSIUM CHLORIDE 20 MEQ/1
40 TABLET, EXTENDED RELEASE ORAL DAILY
Status: DISCONTINUED | OUTPATIENT
Start: 2023-08-09 | End: 2023-08-09

## 2023-08-09 RX ORDER — TRIMETHOPRIM 100 MG/1
100 TABLET ORAL DAILY
Status: DISCONTINUED | OUTPATIENT
Start: 2023-08-09 | End: 2023-08-09 | Stop reason: HOSPADM

## 2023-08-09 RX ORDER — TAMSULOSIN HYDROCHLORIDE 0.4 MG/1
1 CAPSULE ORAL NIGHTLY
Status: DISCONTINUED | OUTPATIENT
Start: 2023-08-09 | End: 2023-08-09 | Stop reason: HOSPADM

## 2023-08-09 RX ORDER — ENOXAPARIN SODIUM 100 MG/ML
40 INJECTION SUBCUTANEOUS EVERY 12 HOURS
Status: DISCONTINUED | OUTPATIENT
Start: 2023-08-09 | End: 2023-08-09 | Stop reason: HOSPADM

## 2023-08-09 RX ADMIN — LEVETIRACETAM 500 MG: 500 TABLET, FILM COATED ORAL at 08:08

## 2023-08-09 RX ADMIN — DOCUSATE SODIUM 100 MG: 100 CAPSULE, LIQUID FILLED ORAL at 08:08

## 2023-08-09 RX ADMIN — FAMOTIDINE 20 MG: 20 TABLET, FILM COATED ORAL at 08:08

## 2023-08-09 RX ADMIN — POLYETHYLENE GLYCOL 3350 17 G: 17 POWDER, FOR SOLUTION ORAL at 08:08

## 2023-08-09 RX ADMIN — TRIMETHOPRIM 100 MG: 100 TABLET ORAL at 10:08

## 2023-08-09 RX ADMIN — ENOXAPARIN SODIUM 40 MG: 40 INJECTION SUBCUTANEOUS at 10:08

## 2023-08-09 NOTE — DISCHARGE SUMMARY
[facility]  Trauma Surgery  Discharge Summary      Patient Name: Erik Lundy  MRN: [id number]  Admission Date: 8/8/2023  Hospital Length of Stay: 0 days  Discharge Date and Time:  08/09/2023 2:33 PM  Attending Physician: Wojciech Kwan MD   Discharging Provider: GABI Parson  Primary Care Provider: Benedicto Vargas MD    HPI:   No notes on file    * No surgery found *      Indwelling Lines/Drains at time of discharge:   Lines/Drains/Airways     Peripherally Inserted Central Catheter Line  Duration           PICC Double Lumen 06/28/23 0714 right basilic 42 days              Hospital Course: 87-year-old male was in his oncology appointment and had some hypotension and dizziness and was sent to the emergency department.  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  After repeat imaging this fluid collection is stable.  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  He did get an echocardiogram and carotid ultrasound which were unremarkable for the cause of his frequent falls.  Given his age and chemotherapy this likely is related.  It appears that the chemotherapy will be stopped by the oncologist.  It is imperative that the patient follow up with his primary care provider to continue assessing these falls and altering any factors that may contribute.  He will need follow up with his oncologist as well who follows him closely and Neurosurgery we will see the patient as an outpatient.  He will need some DME he was recommended by our therapist who deemed him safe for discharge home.  Case management is aware and once this is obtained he will be discharged.      Goals of Care Treatment Preferences:  Code Status: Full Code      Consults:   Consults (From admission, onward)        Status Ordering Provider     Inpatient consult to Social Work/Case Management  Once        Provider:  (Not yet assigned)     Acknowledged BAO ARMSTRONG     Inpatient consult to Neurosurgery  Once        Provider:  Emma Bennett MD    Completed MELINDA WOOD          Significant Diagnostic Studies: N/A    Pending Diagnostic Studies:     None        Final Active Diagnoses:    Diagnosis Date Noted POA    PRINCIPAL PROBLEM:  SDH (subdural hematoma) [S06.5XAA] 08/09/2023 Yes      Problems Resolved During this Admission:      Discharged Condition: good    Disposition: Home or Self Care    Follow Up:   Follow-up Information     Iveth Jain FNP Follow up.    Specialty: Neurosurgery  Why: 2 weeks with repeat CT head  Contact information:  [address] [place] [address] [address]  [place] [address]  [phone]             Benedicto Vargas MD Follow up in 2 day(s).    Specialty: Family Medicine  Contact information:  [address] [place] [address] [id number] [phone]                       Patient Instructions:   No discharge procedures on file.  Medications:  Reconciled Home Medications:      Medication List      CONTINUE taking these medications    dexAMETHasone 4 MG Tab  Commonly known as: DECADRON  Take 2 tablets(8 mg) once daily on days 2,3, and 4 of chemotherapy cycle.     ondansetron 8 MG tablet  Commonly known as: ZOFRAN  Take 1 tablet (8 mg total) by mouth every 8 (eight) hours as needed for Nausea (Nausea).     potassium chloride SA 20 MEQ tablet  Commonly known as: K-DUR,KLOR-CON  Take 2 tablets (40 mEq total) by mouth once daily. for 14 days     tamsulosin 0.4 mg Cap  Commonly known as: FLOMAX  Take 1 capsule (0.4 mg total) by mouth every evening.     trimethoprim 100 mg Tab  Commonly known as: TRIMPEX  Take 100 mg by mouth once daily.          Time spent on the discharge of patient: 35 minutes    GABI Parson  Trauma Surgery  [facility] [place] [facility]

## 2023-08-09 NOTE — TERTIARY TRAUMA SURVEY NOTE
TERTIARY TRAUMA SURVEY (TTS)    List Injuries Identified to Date:   1. Subdural hematoma    List Operations and Procedures:   1. None    Past Surgical History:   Procedure Laterality Date    BIOPSY OF THYROID Right        Incidental findings:   1. None    Past Medical History:   1. As listed below.    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   HENT:      Head: Normocephalic and atraumatic.      Nose: Nose normal.   Eyes:      Pupils: Pupils are equal, round, and reactive to light.   Cardiovascular:      Rate and Rhythm: Normal rate.      Pulses: Normal pulses.      Comments: Normal peripheral pulses  Pulmonary:      Effort: Pulmonary effort is normal. No respiratory distress.   Chest:      Chest wall: No tenderness.   Abdominal:      General: Abdomen is flat. Bowel sounds are normal. There is no distension.      Palpations: Abdomen is soft.      Tenderness: There is no abdominal tenderness.   Musculoskeletal:         General: No swelling, tenderness, deformity or signs of injury.       Cervical back: Normal range of motion and neck supple. No tenderness.   Skin:     General: Skin is warm and dry.      Capillary Refill: Capillary refill takes less than 2 seconds.      Findings: No lesion.   Neurological:      General: No focal deficit present.      Mental Status: He is alert and oriented to person, place, and time. Mental status is at baseline.   Psychiatric:         Mood and Affect: Mood normal.         Behavior: Behavior normal.         Thought Content: Thought content normal.         Judgment: Judgment normal.         Imaging Review:     Imaging Results              CT Head Without Contrast (Final result)  Result time 08/08/23 20:06:29      Final result by Maged Sullivan MD (08/08/23 20:06:29)                   Impression:      No interval change.  No acute intracranial findings identified.      Electronically signed by: Maged Sullivan  Date:    08/08/2023  Time:    20:06               Narrative:    EXAMINATION:  CT HEAD WITHOUT CONTRAST    CLINICAL HISTORY:  Subdural hemorrhage;    TECHNIQUE:  Sequential axial images were performed of the brain without contrast.    Dose product length of 955 mGycm. Automated exposure control was utilized to minimize radiation dose.    COMPARISON:  CT brain August 8, 2023.  MRI brain June 29, 2023    FINDINGS:  There is no intracranial mass effect, midline shift, hydrocephalus or hemorrhage. There is no sulcal effacement or low attenuation changes to suggest recent large vessel territory infarction. Chronic appearing periventricular and subcortical white matter low attenuation changes are present and are consistent with chronic microangiopathic ischemia. The ventricular system and sulcal markings prominence is consistent with atrophy.  Right cerebral convexity chronic subdural hematoma or hygroma is without significant interval change.  There is no acute hyperdense extra axial fluid collection.  Mucoperiosteal thickening of the right maxillary sinus.   Otherwise, visualized paranasal sinuses are clear without mucosal thickening, polypoidal abnormality or air-fluid levels. Mastoid air cells aeration is optimal.                                       Lab Review:   CBC:  Recent Labs   Lab Result Units 06/28/23  0653 07/06/23  0652 07/10/23  0727 07/12/23  0750 07/18/23  0657 07/25/23  0954 08/02/23  1321 08/08/23  0747 08/08/23 2011 08/09/23  0354   WBC x10(3)/mcL 7.07 1.17* 2.08* 14.21* 7.06 5.40 15.25* 9.74 10.72 10.40   RBC x10(6)/mcL 4.03* 5.99 3.96* 3.80* 3.63* 3.55* 3.61* 3.59* 3.62* 3.48*   Hgb g/dL 12.7* 19.0* 12.6* 11.9* 11.4* 11.4* 11.4* 11.3* 11.4* 10.8*   Hct % 38.7* 56.1* 37.3* 35.9* 34.4* 33.9* 32.3* 33.5* 32.9* 31.2*   Platelet x10(3)/mcL 213 71* 170 189 303 274 121* 302 317 293   MCV fL 96.0* 93.7 94.2* 94.5* 94.8* 95.5* 89.5 93.3 90.9 89.7   MCH pg 31.5* 31.7* 31.8* 31.3* 31.4* 32.1* 31.6* 31.5* 31.5* 31.0   MCHC g/dL 32.8* 33.9 33.8 33.1 33.1 33.6 35.3 33.7 34.7 34.6       CMP:  Recent Labs   Lab Result Units 06/28/23  0653 07/06/23  0652 07/10/23  0727 07/12/23  0750 07/18/23  0657 07/25/23  0954 08/02/23  1321 08/08/23  0747 08/08/23 2011 08/09/23  0354   Calcium Level Total mg/dL 9.0 9.2 9.1 9.0 9.4 9.2 8.3* 8.9 8.7* 8.6*   Albumin Level g/dL 3.8 3.7 3.6 3.3* 3.2* 3.2* 3.3* 3.4 3.2* 3.1*   Sodium Level mmol/L 140 134* 136 136 135* 135* 126* 129* 130* 132*   Potassium Level mmol/L 4.0 4.6 3.9 3.5 3.9 4.7 3.4* 5.0 4.9 4.8   Carbon Dioxide mmol/L 27 29 24 27 26 27 24 24 27 26   Blood Urea Nitrogen mg/dL 17.8 26.5* 15.6 23.2 14.2 18.6 34.0* 15.2 17.3 14.9   Creatinine mg/dL 1.95* 1.61* 1.46* 1.47* 1.34* 1.25* 2.18* 1.31* 1.42* 1.33*   Alkaline Phosphatase unit/L 93 75 68 85 77 95 77 99 99 96   Alanine Aminotransferase unit/L 14 33 34 48 101* 41 52 53 47 45   Aspartate Aminotransferase unit/L 18 28 26 36* 77* 24 61* 39* 34 31   Bilirubin Total mg/dL 0.4 0.5 0.5 0.4 0.4 0.4 0.4 0.9 0.7 0.7       Troponin:  Recent Labs   Lab Result Units 08/08/23  0925  "  Troponin-I ng/mL 0.027       ETOH:  No results for input(s): "ETHANOL" in the last 72 hours.     Urine Drug Screen:  No results for input(s): "COCAINE", "OPIATE", "BARBITURATE", "AMPHETAMINE", "FENTANYL", "CANNABINOIDS", "MDMA" in the last 72 hours.    Invalid input(s): "BENZODIAZEPINE", "PHENCYCLIDINE"   Plan:   Head CT stable   No complaints  Normal exam   We will follow up neurosurgery, likely can start Lovenox today, possible discharge  We will follow up echo and carotid due to frequent falls  If not able to discharge we will discuss transfer to Medicine  Dwain Ardon NP  c - [phone]    "

## 2023-08-09 NOTE — PLAN OF CARE
Problem: Occupational Therapy  Goal: Occupational Therapy Goal  Description: Goals to be met by: 8/23     Patient will increase functional independence with ADLs by performing:    LE Dressing with Modified [place].  Grooming while standing at sink with Modified [place].  Toileting from toilet with Modified [place] for hygiene and clothing management.   Toilet transfer to toilet with Modified [place].    Outcome: Ongoing, Progressing

## 2023-08-09 NOTE — H&P
Trauma Surgery   History and Physical Note    Patient Name: Erik Lundy  YOB: 1935  Date: 08/08/2023 9:10 PM  Date of Admission: 8/8/2023  HD#0  POD#* No surgery found *    PRESENTING HISTORY   Chief Complaint/Reason for Admission: GLF. 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 Reports multiple falls at home (up to 8 in the last couple weeks) which seemingly following some dizziness, no LOC. Has a superficial abrasions/ laceration to left brow line. Denies pain or complaint but was referred to ER after his Oncology clinic witness a fall. CT head favors SDH with chronic changes but was not on scan two months prior. Of note, patient in overall poor care with poor dentition, matted hair and prominent bones. NSGY was consulted by ED which recommended admission to floor with our service.   Lab history reviewed with Scr from 1.3-1.8. Current laboratory data with Na 130, Scr 1.4, trop 0.027    Review of Systems:  12 point ROS negative except as stated in HPI    PAST HISTORY:   Past medical history:  Past Medical History:   Diagnosis Date    Skin cancer        Past surgical history:  Past Surgical History:   Procedure Laterality Date    BIOPSY OF THYROID Right        Family history:  No family history on file.    Social history:  Social History     Socioeconomic History    Marital status: Single   Tobacco Use    Smoking status: Never     Passive exposure: Never    Smokeless tobacco: Never   Substance and Sexual Activity    Alcohol use: Yes     Alcohol/week: 3.0 standard drinks of alcohol     Types: 3 Cans of beer per week     Comment: 3 cans of beer on the weekend    Drug use: Never    Sexual activity: Not Currently     Social History     Tobacco Use   Smoking Status Never    Passive exposure: Never   Smokeless Tobacco Never      Social History     Substance and Sexual  Activity   Alcohol Use Yes    Alcohol/week: 3.0 standard drinks of alcohol    Types: 3 Cans of beer per week    Comment: 3 cans of beer on the weekend        MEDICATIONS & ALLERGIES:   Allergies: Review of patient's allergies indicates:  No Known Allergies  Home Meds:   Current Outpatient Medications   Medication Instructions    dexAMETHasone (DECADRON) 4 MG Tab Take 2 tablets(8 mg) once daily on days 2,3, and 4 of chemotherapy cycle.    ondansetron (ZOFRAN) 8 mg, Oral, Every 8 hours PRN    potassium chloride SA (K-DUR,KLOR-CON) 20 MEQ tablet 40 mEq, Oral, Daily    tamsulosin (FLOMAX) 0.4 mg, Oral, Nightly    trimethoprim (TRIMPEX) 100 mg, Oral, Daily      Current Facility-Administered Medications on File Prior to Encounter   Medication Dose Route Frequency Provider Last Rate Last Admin    [COMPLETED] 0.9%  NaCl infusion  500 mL Intravenous ED 1 Time Greg Maddox MD   Stopped at 08/08/23 1204    sodium chloride 0.9% flush 10 mL  10 mL Intravenous PRN Compa Garcia MD        [DISCONTINUED] 0.9%  NaCl infusion  1,000 mL Intravenous ED 1 Time Greg Maddox MD         Current Outpatient Medications on File Prior to Encounter   Medication Sig Dispense Refill    dexAMETHasone (DECADRON) 4 MG Tab Take 2 tablets(8 mg) once daily on days 2,3, and 4 of chemotherapy cycle. 24 tablet 1    ondansetron (ZOFRAN) 8 MG tablet Take 1 tablet (8 mg total) by mouth every 8 (eight) hours as needed for Nausea (Nausea). 30 tablet 2    potassium chloride SA (K-DUR,KLOR-CON) 20 MEQ tablet Take 2 tablets (40 mEq total) by mouth once daily. for 14 days 28 tablet 0    tamsulosin (FLOMAX) 0.4 mg Cap Take 1 capsule (0.4 mg total) by mouth every evening. 30 capsule 2    trimethoprim (TRIMPEX) 100 mg Tab Take 100 mg by mouth once daily.        Current Facility-Administered Medications on File Prior to Encounter   Medication    [COMPLETED] 0.9%  NaCl infusion    sodium chloride 0.9% flush 10 mL    [DISCONTINUED] 0.9%  NaCl infusion     Current  Outpatient Medications on File Prior to Encounter   Medication Sig    dexAMETHasone (DECADRON) 4 MG Tab Take 2 tablets(8 mg) once daily on days 2,3, and 4 of chemotherapy cycle.    ondansetron (ZOFRAN) 8 MG tablet Take 1 tablet (8 mg total) by mouth every 8 (eight) hours as needed for Nausea (Nausea).    potassium chloride SA (K-DUR,KLOR-CON) 20 MEQ tablet Take 2 tablets (40 mEq total) by mouth once daily. for 14 days    tamsulosin (FLOMAX) 0.4 mg Cap Take 1 capsule (0.4 mg total) by mouth every evening.    trimethoprim (TRIMPEX) 100 mg Tab Take 100 mg by mouth once daily.     Scheduled Meds:   docusate sodium  100 mg Oral BID    [START ON 8/9/2023] famotidine  20 mg Oral Daily    levETIRAcetam  500 mg Oral BID    polyethylene glycol  17 g Oral BID     Continuous Infusions:   sodium chloride 0.9% 75 mL/hr at 08/08/23 2119     PRN Meds:bisacodyL, melatonin, methocarbamoL, morphine, oxyCODONE    OBJECTIVE:   Vital Signs:  VITAL SIGNS: 24 HR MIN & MAX LAST   Temp  Min: 97.2 °F (36.2 °C)  Max: 98.1 °F (36.7 °C)  98.1 °F (36.7 °C)   BP  Min: 113/65  Max: 168/95  131/84    Pulse  Min: 65  Max: 97  97    Resp  Min: 15  Max: 18  18    SpO2  Min: 95 %  Max: 99 %  97 %      HT: 6' (182.9 cm)  WT: 69.9 kg (154 lb)  BMI: 20.9   Intake/output: No intake/output data recorded.     Lines/drains/airway:  PICC Double Lumen 06/28/23 0714 right basilic (Active)   Site Assessment Bleeding 08/08/23 1024   Extremity Assessment Distal to IV No redness;No swelling;No warmth;No abnormal discoloration 08/08/23 1024   Dressing Type No CHG impregnated dressing/sponge (patient < 2 mos) 08/08/23 1024   Dressing Intervention Integrity maintained 08/08/23 1024   Distal Patency/Care flushed w/o difficulty;blood return present 08/08/23 1024   Number of days: 41       Physical Exam:  General:  Well developed, undernourished nourished, no acute distress  HEENT:  Normocephalic, periorbital abrasion  CV:  RR, 2+ DPs bilaterally  Resp/chest: NWOB  GI:   "Abdomen soft, non-tender, non-distended  :  Deferred  MSK:  No muscle atrophy, cyanosis, peripheral edema, moving all extremities spontaneously  Neuro: GCS 15 although with slow verbal response. CNII-XII grossly intact, alert and oriented to person, place, and time. Strength and motor function grossly intact to all extremities, sensation intact to all extremities.  Skin/Wounds:  warm dry     Labs:  Troponin:  Recent Labs     08/08/23  0925   TROPONINI 0.027     CBC:  Recent Labs     08/08/23  0747 08/08/23 2011   WBC 9.74 10.72   RBC 3.59* 3.62*   HGB 11.3* 11.4*   HCT 33.5* 32.9*    317   MCV 93.3 90.9   MCH 31.5* 31.5*   MCHC 33.7 34.7     CMP:  Recent Labs     08/08/23  0747 08/08/23 2011   CALCIUM 8.9 8.7*   ALBUMIN 3.4 3.2*   * 130*   K 5.0 4.9   CO2 24 27   BUN 15.2 17.3   CREATININE 1.31* 1.42*   ALKPHOS 99 99   ALT 53 47   AST 39* 34   BILITOT 0.9 0.7     Lactic Acid:  No results for input(s): "LACTATE" in the last 72 hours.  ETOH:  No results for input(s): "ETHANOL" in the last 72 hours.   Urine Drug Screen:  No results for input(s): "COCAINE", "OPIATE", "BARBITURATE", "AMPHETAMINE", "FENTANYL", "CANNABINOIDS", "MDMA" in the last 72 hours.    Invalid input(s): "BENZODIAZEPINE", "PHENCYCLIDINE"   ABG  No results for input(s): "PH", "PO2", "PCO2", "HCO3", "BE" in the last 168 hours.      Diagnostic Results:  CT Head Without Contrast   Final Result      No interval change.  No acute intracranial findings identified.         Electronically signed by: Maged Sullivan   Date:    08/08/2023   Time:    20:06          ASSESSMENT & PLAN:    87 year old male with PMH BPH/ proctitis and adenocarcinoma admitted with SDH following multiple GLF.    - Admit for obs with tele   - Obtain Echo and carotid doppler  - NSGY consulted, will follow recs  - NPO   - Keppra   - PT/OT and CM consults   - pain control PRN   - gentle IVF   - daily labs  - obtain and resume pertinent home meds   - hold VTE ppx due to SDH  - " GI ppx with H2B      Monica Stewart [facility]   Trauma/Acute Care Surgery  [facility]  C: [phone]

## 2023-08-09 NOTE — NURSING
Nurses Note -- 4 Eyes      8/8/2023   11:07 PM      Skin assessed during: Admit      [] No Altered Skin Integrity Present    [x]Prevention Measures Documented      [x] Yes- Altered Skin Integrity Present or Discovered   [] LDA Added if Not in Epic (Describe Wound)   [] New Altered Skin Integrity was Present on Admit and Documented in LDA   [] Wound Image Taken    Wound Care Consulted? Yes    Attending Nurse:  Negra Ospina RN/Staff Member:   Prabhjot Barakat LPN

## 2023-08-09 NOTE — ED PROVIDER NOTES
Encounter Date: 8/8/2023       History     Chief Complaint   Patient presents with    Transfer      Pt. Transferred from [facility] for Neuro services s/t SDH.      Erik Lundy is a 87 y.o. male with a past medical history of skin cancer who presents to the ED for evaluation after several falls.  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  Neurosurgery was consulted and recommended transfer to our hospital for further evaluation and management.  Patient is awake and alert and answering questions appropriately.  He denies any pain currently.          Review of patient's allergies indicates:  No Known Allergies  Past Medical History:   Diagnosis Date    Skin cancer      Past Surgical History:   Procedure Laterality Date    BIOPSY OF THYROID Right      No family history on file.  Social History     Tobacco Use    Smoking status: Never     Passive exposure: Never    Smokeless tobacco: Never   Substance Use Topics    Alcohol use: Yes     Alcohol/week: 3.0 standard drinks of alcohol     Types: 3 Cans of beer per week     Comment: 3 cans of beer on the weekend    Drug use: Never     Review of Systems   Constitutional:  Negative for chills and fever.   HENT:  Negative for drooling and sore throat.    Eyes:  Negative for pain and redness.   Respiratory:  Negative for shortness of breath, wheezing and stridor.    Cardiovascular:  Negative for chest pain, palpitations and leg swelling.   Gastrointestinal:  Negative for abdominal pain, nausea and vomiting.   Genitourinary:  Negative for dysuria and hematuria.   Musculoskeletal:  Negative for back pain, neck pain and neck stiffness.   Skin:  Negative for rash and wound.   Neurological:  Negative for weakness and numbness.   Hematological:  Does not bruise/bleed easily.       Physical Exam     Initial Vitals [08/08/23 1901]   BP Pulse Resp Temp SpO2   131/84 97 18 98.1 °F (36.7 °C) 97 %      MAP        --         Physical Exam    Nursing note and vitals reviewed.  Constitutional: He appears well-developed. He is not diaphoretic. No distress.   HENT:   Head: Normocephalic and atraumatic.   Nose: Nose normal.   Mouth/Throat: Oropharynx is clear and moist.   Eyes: Conjunctivae and EOM are normal. Pupils are equal, round, and reactive to light.   Cardiovascular:  Normal rate and regular rhythm.           No murmur heard.  Pulmonary/Chest: Breath sounds normal. No respiratory distress. He has no wheezes. He has no rales.   Abdominal: Abdomen is soft. He exhibits no distension. There is no abdominal tenderness.     Neurological: He is alert and oriented to person, place, and time. He has normal strength. No cranial nerve deficit.   Skin: Skin is warm. Capillary refill takes less than 2 seconds. No rash noted.         ED Course   Critical Care    Date/Time: 8/8/2023 10:41 PM    Performed by: Wil Medina MD  Authorized by: Wil Medina MD  Direct patient critical care time: 35 minutes  Total critical care time (exclusive of procedural time) : 35 minutes  Critical care time was exclusive of separately billable procedures and treating other patients.  Critical care was necessary to treat or prevent imminent or life-threatening deterioration of the following conditions: CNS failure or compromise.  Critical care was time spent personally by me on the following activities: blood draw for specimens, development of treatment plan with patient or surrogate, discussions with consultants, evaluation of patient's response to treatment, examination of patient, obtaining history from patient or surrogate, ordering and performing treatments and interventions, ordering and review of laboratory studies, ordering and review of radiographic studies, pulse oximetry, re-evaluation of patient's condition and review of old charts.        Labs Reviewed   COMPREHENSIVE METABOLIC PANEL - Abnormal; Notable for the following  components:       Result Value    Sodium Level 130 (*)     Chloride 94 (*)     Glucose Level 128 (*)     Creatinine 1.42 (*)     Calcium Level Total 8.7 (*)     Albumin Level 3.2 (*)     All other components within normal limits   PROTIME-INR - Abnormal; Notable for the following components:    PT 14.8 (*)     All other components within normal limits   CBC WITH DIFFERENTIAL - Abnormal; Notable for the following components:    RBC 3.62 (*)     Hgb 11.4 (*)     Hct 32.9 (*)     MCH 31.5 (*)     IG# 0.12 (*)     All other components within normal limits   CBC W/ AUTO DIFFERENTIAL    Narrative:     The following orders were created for panel order CBC auto differential.  Procedure                               Abnormality         Status                     ---------                               -----------         ------                     CBC with Differential[[id number]]        Abnormal            Final result                 Please view results for these tests on the individual orders.          Imaging Results              CT Head Without Contrast (Final result)  Result time 08/08/23 20:06:29      Final result by Maged Sullivan MD (08/08/23 20:06:29)                   Impression:      No interval change.  No acute intracranial findings identified.      Electronically signed by: Maged Sullivan  Date:    08/08/2023  Time:    20:06               Narrative:    EXAMINATION:  CT HEAD WITHOUT CONTRAST    CLINICAL HISTORY:  Subdural hemorrhage;    TECHNIQUE:  Sequential axial images were performed of the brain without contrast.    Dose product length of 955 mGycm. Automated exposure control was utilized to minimize radiation dose.    COMPARISON:  CT brain August 8, 2023.  MRI brain June 29, 2023    FINDINGS:  There is no intracranial mass effect, midline shift, hydrocephalus or hemorrhage. There is no sulcal effacement or low attenuation changes to suggest recent large vessel territory infarction. Chronic appearing  periventricular and subcortical white matter low attenuation changes are present and are consistent with chronic microangiopathic ischemia. The ventricular system and sulcal markings prominence is consistent with atrophy.  Right cerebral convexity chronic subdural hematoma or hygroma is without significant interval change.  There is no acute hyperdense extra axial fluid collection.  Mucoperiosteal thickening of the right maxillary sinus.  Otherwise, visualized paranasal sinuses are clear without mucosal thickening, polypoidal abnormality or air-fluid levels. Mastoid air cells aeration is optimal.                                       Medications   0.9%  NaCl infusion ( Intravenous New Bag 8/8/23 2119)   oxyCODONE immediate release tablet 5 mg (has no administration in time range)   morphine injection 2 mg (has no administration in time range)   methocarbamoL tablet 500 mg (has no administration in time range)   levETIRAcetam tablet 500 mg (500 mg Oral Given 8/8/23 2103)   famotidine tablet 20 mg (has no administration in time range)   melatonin tablet 6 mg (has no administration in time range)   polyethylene glycol packet 17 g (17 g Oral Given 8/8/23 2103)   docusate sodium capsule 100 mg (100 mg Oral Given 8/8/23 2103)   bisacodyL suppository 10 mg (has no administration in time range)                 ED Course as of 08/08/23 2244   Tue Aug 08, 2023   1929 Neurosurgery paged [JG]   1935 Trauma paged [JG]      ED Course User Index  [JG] Dwain Ireland          Medical Decision Making  Problems Addressed:  Recurrent falls: acute illness or injury with systemic symptoms that poses a threat to life or bodily functions    Amount and/or Complexity of Data Reviewed  Labs: ordered.  Radiology: ordered.        Differential diagnosis (includes but is not limited to):   ICH, skull fracture, electrolyte abnormalities, dehydration    MDM Narrative  87-year-old male presents as a transfer from an outside hospital for  neurosurgical evaluation of a subdural hematoma.  Patient is awake and alert with a GCS of 15 in his moving all extremities.  Neurosurgery has been consulted, appreciate recommendations.  Post transfer labs are pending.  Neuro checks.    Update:  Neurosurgery recommends admission to the floor, CT head at the 6 hour vikki, q.4 hours neuro checks.  Trauma consulted and will admit the patient to the floor.    Dispo:  Admit    My independent radiology interpretation:  As above  Point of care US (independently performed and interpreted):   Decision rules/clinical scoring:     Sepsis Perfusion Assessment:     Amount and/or Complexity of Data Reviewed  Independent historian: EMS   Summary of history:  Report received from EMS including patient complaint, vital signs, medications given  External data reviewed: notes from previous admissions, notes from previous ED visits, notes from clinic visits, and transfer records  Summary of data reviewed:  Prior notes reviewed in the electronic medical record.  Transfer records reviewed  Risk and benefits of testing: discussed   Labs: ordered and reviewed  Radiology: ordered and independent interpretation performed (see above or ED course)  ECG/medicine tests: ordered and independent interpretation performed (see above or ED course)  Discussion of management or test interpretation with external provider(s): discussed with Trauma, Neurosurgery consultant   Summary of discussion:  As above    Risk  Parenteral controlled substances   Drug therapy requiring intense monitoring for toxicity   Decision regarding hospitalization  Shared decision making     Critical Care  30-74 minutes     Data Reviewed/Counseling: I have personally reviewed the patient's vital signs, nursing notes, and other relevant tests, information, and imaging. I had a detailed discussion regarding the historical points, exam findings, and any diagnostic results supporting the discharge diagnosis. I personally performed  the history, PE, MDM and procedures as documented above and agree with the scribe's documentation.    Portions of this note were dictated using voice recognition software. Although it was reviewed for accuracy, some inherent voice recognition errors may have occurred and may be present in this document.        Clinical Impression:   Final diagnoses:  [R29.6] Recurrent falls (Primary)  [S06.5XAA] SDH (subdural hematoma)        ED Disposition Condition    Observation                 Wil Medina MD  08/08/23 2991

## 2023-08-09 NOTE — PLAN OF CARE
Problem: Physical Therapy  Goal: Physical Therapy Goal  Description: Goals to be met by: 23     Patient will increase functional independence with mobility by performin. Gait  x 400 feet with Modified [place] using Rolling Walker.   2. Ascend/descend 2 stairs with no Handrails & Modified [place]   3. Increased functional strength to 5/5 in BLE's.    Outcome: Ongoing, Progressing

## 2023-08-09 NOTE — NURSING
Subjective:      Patient ID: Erik Lundy is a 87 y.o. male.    Chief Complaint: Transfer  (Pt. Transferred from [facility] for Neuro services s/t SDH. )    HPI  Review of Systems   Objective:     Physical Exam   Assessment:     1. Recurrent falls    2. SDH (subdural hematoma)    3. Near syncope           Altered Skin Integrity 08/08/23 2214 Right anterior Knee (Active)   08/08/23 2214   Altered Skin Integrity Present on Admission - Did Patient arrive to the hospital with altered skin?: yes   Side: Right   Orientation: anterior   Location: Knee   Wound Number:    Is this injury device related?: No   Primary Wound Type:    Description of Altered Skin Integrity:    Ankle-Brachial Index:    Pulses:    Removal Indication and Assessment:    Wound Outcome:    (Retired) Wound Length (cm):    (Retired) Wound Width (cm):    (Retired) Depth (cm):    Wound Description (Comments):    Removal Indications:    Wound Image   08/09/23 1000   Dressing Appearance Open to air 08/09/23 0800   Drainage Amount None 08/09/23 1000   Care Cleansed with:;Wound cleanser;Applied:;Povidone iodine 08/09/23 1000            Altered Skin Integrity 08/08/23 2214 Left anterior Knee (Active)   08/08/23 2214   Altered Skin Integrity Present on Admission - Did Patient arrive to the hospital with altered skin?: yes   Side: Left   Orientation: anterior   Location: Knee   Wound Number:    Is this injury device related?:    Primary Wound Type:    Description of Altered Skin Integrity:    Ankle-Brachial Index:    Pulses:    Removal Indication and Assessment:    Wound Outcome:    (Retired) Wound Length (cm):    (Retired) Wound Width (cm):    (Retired) Depth (cm):    Wound Description (Comments):    Removal Indications:    Wound Image   08/09/23 1000   Dressing Appearance Open to air 08/08/23 2214   Drainage Amount None 08/09/23 1000   Wound Length (cm) 1.6 cm 08/09/23 1000   Wound Width (cm) 1 cm 08/09/23 1000   Wound Surface Area (cm^2) 1.6 cm^2 08/09/23 1000   Care  Cleansed with:;Wound cleanser;Applied:;Povidone iodine 08/09/23 1000            Altered Skin Integrity 08/08/23 2214 Left posterior Elbow (Active)   08/08/23 2214   Altered Skin Integrity Present on Admission - Did Patient arrive to the hospital with altered skin?: yes   Side: Left   Orientation: posterior   Location: Elbow   Wound Number:    Is this injury device related?:    Primary Wound Type:    Description of Altered Skin Integrity:    Ankle-Brachial Index:    Pulses:    Removal Indication and Assessment:    Wound Outcome:    (Retired) Wound Length (cm):    (Retired) Wound Width (cm):    (Retired) Depth (cm):    Wound Description (Comments):    Removal Indications:    Wound Image   08/09/23 1000   Dressing Appearance Open to air 08/09/23 0800   Drainage Amount None 08/09/23 1000   Wound Length (cm) 1 cm 08/09/23 1000   Wound Width (cm) 3 cm 08/09/23 1000   Wound Surface Area (cm^2) 3 cm^2 08/09/23 1000   Care Cleansed with:;Wound cleanser;Applied:;Povidone iodine 08/09/23 1000   Dressing Applied;Foam 08/09/23 0800            Altered Skin Integrity 08/08/23 2214 Right lower;proximal;dorsal Arm (Active)   08/08/23 2214   Altered Skin Integrity Present on Admission - Did Patient arrive to the hospital with altered skin?: yes   Side: Right   Orientation: lower;proximal;dorsal   Location: Arm   Wound Number:    Is this injury device related?:    Primary Wound Type:    Description of Altered Skin Integrity:    Ankle-Brachial Index:    Pulses:    Removal Indication and Assessment:    Wound Outcome:    (Retired) Wound Length (cm):    (Retired) Wound Width (cm):    (Retired) Depth (cm):    Wound Description (Comments):    Removal Indications:    Wound Image   08/09/23 1000   Dressing Appearance Open to air 08/09/23 0800   Drainage Amount None 08/09/23 1000   Wound Length (cm) 2 cm 08/09/23 1000   Wound Width (cm) 3 cm 08/09/23 1000   Wound Surface Area (cm^2) 6 cm^2 08/09/23 1000   Care Cleansed with:;Wound  cleanser;Applied:;Povidone iodine 08/09/23 1000   Dressing Applied;Foam 08/09/23 0800            Altered Skin Integrity 08/08/23 2214 Left Eyebrow (Active)   08/08/23 2214   Altered Skin Integrity Present on Admission - Did Patient arrive to the hospital with altered skin?: yes   Side: Left   Orientation:    Location: Eyebrow   Wound Number:    Is this injury device related?:    Primary Wound Type:    Description of Altered Skin Integrity:    Ankle-Brachial Index:    Pulses:    Removal Indication and Assessment:    Wound Outcome:    (Retired) Wound Length (cm):    (Retired) Wound Width (cm):    (Retired) Depth (cm):    Wound Description (Comments):    Removal Indications:    Wound Image   08/09/23 1000   Dressing Appearance Open to air 08/09/23 0800   Drainage Amount None 08/09/23 1000   Wound Length (cm) 0.3 cm 08/09/23 1000   Wound Width (cm) 1 cm 08/09/23 1000   Wound Surface Area (cm^2) 0.3 cm^2 08/09/23 1000   Care Cleansed with:;Wound cleanser;Applied:;Povidone iodine 08/09/23 1000       Plan:            WOCN consult-86 y/o in with recurrent falls syncopy and a subdural hematoma.  Currently awake alert-able to assist with turning.     He has multiple old dried scabs to elbows and knees and a sm scab to his left eyebrow.   See photos.  Care  put in place with instructions to nurse.    Nursing to monitor offloading as needed q shift with q2hr turning when in bed and use of wedge.  Will check on him weekly while in hospital.

## 2023-08-09 NOTE — PT/OT/SLP EVAL
Physical Therapy Evaluation    Patient Name:  Erik Lundy   MRN:  [id number]    Recommendations:     Discharge Recommendations: home health PT   Discharge Equipment Recommendations: walker, rolling   Barriers to discharge: None    Assessment:     Erik Lundy is a 87 y.o. male admitted with recurrent falls & subacute-chronic SDH. Patient reports living alone in [facility] with 2 steps to enter. At baseline, he is independent.  He presents with the following impairments/functional limitations: weakness, gait instability, impaired balance, decreased safety awareness. He was SBA for bed mobility. Ambulated 200 ft with CGA-MIN A. Balance was very poor. He would benefit from a rolling walker and  PT at discharge. Progress patient as tolerated.     Rehab Prognosis: Good; patient would benefit from acute skilled PT services to address these deficits and reach maximum level of function.    Recent Surgery: * No surgery found *      Plan:     During this hospitalization, patient to be seen 3 x/week (3-5x/week) to address the identified rehab impairments via gait training, therapeutic activities, therapeutic exercises, neuromuscular re-education and progress toward the following goals:    Plan of Care Expires:  09/09/23    Subjective     Chief Complaint: none  Patient/Family Comments/goals: none  Pain/Comfort:  Pain Rating 1: 0/10    Patients cultural, spiritual, Nondenominational conflicts given the current situation: no    Living Environment:  Lives alone in [facility] with 2 steps to enter.  Prior to admission, patients level of function was independent.  Equipment used at home: none.  DME owned (not currently used): none.  Upon discharge, patient will have assistance from no one.    Objective:     Communicated with nurse prior to session.  Patient found supine with telemetry, PureWick  upon PT entry to room.    General Precautions: Standard, fall  Orthopedic Precautions:N/A   Braces: N/A  Respiratory Status: Room air    Exams:  Cognitive  Exam:  Patient is oriented to Person, Place, Time, and Situation  Sensation: -       Intact  RLE ROM: WFL  RLE Strength: 4/5 grossly  LLE ROM: WFL  LLE Strength: 4/5 grossly  Skin integrity: Visible skin intact      Functional Mobility:  Bed Mobility:     Supine to Sit: stand by assistance  Transfers:  Sit to Stand:  contact guard assistance with rolling walker  Gait: 200 ft with CGA-MIN A  Balance: poor      AM-PAC 6 CLICK MOBILITY  Total Score:20     Education Provided:  Role and goals of PT, transfer training, bed mobility, gait training, balance training, safety awareness, assistive device, strengthening exercises, deep breathing techniques, and importance of participating in PT to return to PLOF.    Expected compliance:  Moderate      Patient left up in chair with all lines intact, call button in reach, and educated on calling for assistance when ready to return to bed .    GOALS:   Multidisciplinary Problems       Physical Therapy Goals          Problem: Physical Therapy    Goal Priority Disciplines Outcome Goal Variances Interventions   Physical Therapy Goal     PT, PT/OT Ongoing, Progressing     Description: Goals to be met by: 23     Patient will increase functional independence with mobility by performin. Gait  x 400 feet with Modified [place] using Rolling Walker.   2. Ascend/descend 2 stairs with no Handrails & Modified [place]   3. Increased functional strength to 5/5 in BLE's.                         History:     Past Medical History:   Diagnosis Date    Skin cancer        Past Surgical History:   Procedure Laterality Date    BIOPSY OF THYROID Right        Time Tracking:     PT Received On: 23  PT Start Time: 935     PT Stop Time: 1000  PT Total Time (min): 25 min     Billable Minutes: Evaluation 25 minutes      2023

## 2023-08-09 NOTE — CONSULTS
[facility] - Ortho Neuro  Neurosurgery  Consult Note    Inpatient consult to Neurosurgery  Consult performed by: Isela Bingham PA  Consult ordered by: Wil Medina MD  Reason for consult: Chronic SDH        Subjective:     Chief Complaint/Reason for Admission: SDH    History of Present Illness: Patient is an 87 y.o. 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 Patient was sent to the Ed from the oncology clinic after having a fall while he was walking in the clinic. Clinic nurse reported patient appeared disheveled and very weak. He reported 8 falls over the last two days. He does not remember what happens and just finds himself on the floor. He is followed by HH at home.    [facility] from [facility] was significant for hypoattenuating right frontal SDH collection most suggestive of chronic SDH. Dr. Bennett was consulted for evaluation and treatment recommendations.    On PE today he is sitting up in bed, NAD. He currently denies HA, blurred vision and N/V. He denies weakness but does state he falls frequently d/t weakness when standing. No other complaints at time of rounds. Of note, patient is very hard of hearing.    PTA Medications   Medication Sig    dexAMETHasone (DECADRON) 4 MG Tab Take 2 tablets(8 mg) once daily on days 2,3, and 4 of chemotherapy cycle.    ondansetron (ZOFRAN) 8 MG tablet Take 1 tablet (8 mg total) by mouth every 8 (eight) hours as needed for Nausea (Nausea).    potassium chloride SA (K-DUR,KLOR-CON) 20 MEQ tablet Take 2 tablets (40 mEq total) by mouth once daily. for 14 days    tamsulosin (FLOMAX) 0.4 mg Cap Take 1 capsule (0.4 mg total) by mouth every evening.    trimethoprim (TRIMPEX) 100 mg Tab Take 100 mg by mouth once daily.       Review of patient's allergies indicates:  No Known Allergies    Past Medical History:   Diagnosis Date     Skin cancer      Past Surgical History:   Procedure Laterality Date    BIOPSY OF THYROID Right      Family History    None       Tobacco Use    Smoking status: Never     Passive exposure: Never    Smokeless tobacco: Never   Substance and Sexual Activity    Alcohol use: Yes     Alcohol/week: 3.0 standard drinks of alcohol     Types: 3 Cans of beer per week     Comment: 3 cans of beer on the weekend    Drug use: Never    Sexual activity: Not Currently     Review of Systems  Objective:   12 pt ROS WNL, except for HPI    Weight: 69.9 kg (154 lb)  Body mass index is 20.88 kg/m².  Vital Signs (Most Recent):  Temp: 97.5 °F (36.4 °C) (08/09/23 0951)  Pulse: 101 (08/09/23 0956)  Resp: 18 (08/09/23 0951)  BP: (!) 107/54 (08/09/23 0956)  SpO2: 96 % (08/09/23 0951) Vital Signs (24h Range):  Temp:  [97.5 °F (36.4 °C)-98.2 °F (36.8 °C)] 97.5 °F (36.4 °C)  Pulse:  [] 101  Resp:  [15-18] 18  SpO2:  [95 %-98 %] 96 %  BP: (107-168)/() 107/54       Physical Exam:  Nursing note and vitals reviewed.    Constitutional: No distress.     Eyes: Pupils are equal, round, and reactive to light. EOM are normal.     Cardiovascular: Intact distal pulses.     Abdominal: Soft.     Psych/Behavior: He is alert. He is oriented to person, place, and time. He has a normal mood and affect.     Musculoskeletal:        Neck: Range of motion is full.        Back: Range of motion is full.        Right Upper Extremities: Range of motion is full. Muscle strength is 5/5.        Left Upper Extremities: Range of motion is full. Muscle strength is 5/5.       Right Lower Extremities: Range of motion is full. Muscle strength is 5/5.        Left Lower Extremities: Range of motion is full. Muscle strength is 5/5.     Neurological:        DTRs: He displays no Babinski's sign on the right side. He displays no Babinski's sign on the left side.        Cranial nerves: Cranial nerve(s) II, III, IV, V, VI, VII, VIII, IX, X, XI and XII are intact.   -pronator  drift       Significant Labs:  Recent Labs   Lab 08/08/23  0747 08/08/23 2011 08/09/23  0354   * 130* 132*   K 5.0 4.9 4.8   CO2 24 27 26   BUN 15.2 17.3 14.9   CREATININE 1.31* 1.42* 1.33*   CALCIUM 8.9 8.7* 8.6*   MG 1.60  --   --      Recent Labs   Lab 08/08/23  0747 08/08/23 2011 08/09/23  0354   WBC 9.74 10.72 10.40   HGB 11.3* 11.4* 10.8*   HCT 33.5* 32.9* 31.2*    317 293     Recent Labs   Lab 08/08/23  1535 08/08/23 2011   INR 1.1 1.2     Microbiology Results (last 7 days)       ** No results found for the last 168 hours. **            Significant Diagnostics:  CT Head Without Contrast [[id number]] Resulted: 08/08/23 2006   Order Status: Completed Updated: 08/08/23 2009   Narrative:     EXAMINATION:   CT HEAD WITHOUT CONTRAST     CLINICAL HISTORY:   Subdural hemorrhage;     TECHNIQUE:   Sequential axial images were performed of the brain without contrast.     Dose product length of 955 mGycm. Automated exposure control was utilized to minimize radiation dose.     COMPARISON:   CT brain August 8, 2023.  MRI brain June 29, 2023     FINDINGS:   There is no intracranial mass effect, midline shift, hydrocephalus or hemorrhage. There is no sulcal effacement or low attenuation changes to suggest recent large vessel territory infarction. Chronic appearing periventricular and subcortical white matter low attenuation changes are present and are consistent with chronic microangiopathic ischemia. The ventricular system and sulcal markings prominence is consistent with atrophy.  Right cerebral convexity chronic subdural hematoma or hygroma is without significant interval change.  There is no acute hyperdense extra axial fluid collection.  Mucoperiosteal thickening of the right maxillary sinus.  Otherwise, visualized paranasal sinuses are clear without mucosal thickening, polypoidal abnormality or air-fluid levels. Mastoid air cells aeration is optimal.    Impression:       No interval change.  No acute  intracranial findings identified.        Assessment/Plan:     He is currently GCS 15, no deficits appreciated.  Repeat CT head done on arrival was reviewed; chronic SDH is stable in size with no mass effect or midline shift  No surgical intervention planned at this time  Recommend further syncopal work up  He will be scheduled to f/u outpatient in 2 weeks for repeat CT head  OK for lovenox if needed  PT/OT for strengthening    Thank you for your consult. I will sign off. Please contact us if you have any additional questions.    SHERRI Sow  Neurosurgery  [facility]

## 2023-08-09 NOTE — TRAUMA COM
Discussed plan of care with  x2 as well as therapy.  All are in agreement that patient is not safe to [facility].  At least not until he has a more comprehensive evaluation by his primary care physician and [facility] who knows him very well.  Patient was GCS 15 and cognitively intact.  He was able to have a discussion including re describing in his own words why we believe the patient should not be driving.  We discuss multiple risks and risk factors.  I also have exploring options for placement with the patient as well as resources to assist with groceries, appointments, etc..  The patient keenly understands the risks of driving at his age and with his medical conditions and what can happen if he crashes.  Regardless, the patient states that he will dry.  He understands we will under no circumstances provide any transportation to [facility] where his vehicle is.  We will be happy to assist him with placement, follow up with his primary care provider which has been accomplished, discussion with the home health agency, community resources, and arrived to his residence.  These discussions were had with the patient in case management the same time.  He was cleared to be discharged as long as we are not providing transportation to his vehicle.

## 2023-08-09 NOTE — HOSPITAL COURSE
87-year-old male was in his oncology appointment and had some hypotension and dizziness and was sent to the emergency department.  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  After repeat imaging this fluid collection is stable.  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  He did get an echocardiogram and carotid ultrasound which were unremarkable for the cause of his frequent falls.  Given his age and chemotherapy this likely is related.  It appears that the chemotherapy will be stopped by the oncologist.  It is imperative that the patient follow up with his primary care provider to continue assessing these falls and altering any factors that may contribute.  He will need follow up with his oncologist as well who follows him closely and Neurosurgery we will see the patient as an outpatient.  He will need some DME he was recommended by our therapist who deemed him safe for discharge home.  Case management is aware and once this is obtained he will be discharged.

## 2023-08-09 NOTE — PT/OT/SLP EVAL
Occupational Therapy  Evaluation    Name: Erik Lundy  MRN: [id number]  Admitting Diagnosis: SDH (subdural hematoma), falls   Recent Surgery: * No surgery found *      Recommendations:     Discharge Recommendations: home with home health  Discharge Equipment Recommendations:  walker, rolling, bath bench  Barriers to discharge:  Decreased caregiver support    Assessment:     Erik Lundy is a 87 y.o. male with a medical diagnosis of SDH (subdural hematoma). Performance deficits affecting function: weakness, impaired functional mobility, impaired endurance, gait instability, impaired balance, impaired self care skills. Pt reported multiple falls at home- recommend use of a RW and a TTB. Pt would benefit from continued skilled therapy services in order to maximize safety and IND c ADLs.     Rehab Prognosis: Good; patient would benefit from acute skilled OT services to address these deficits and reach maximum level of function.       Plan:     Patient to be seen 3 x/week to address the above listed problems via self-care/home management, therapeutic activities, therapeutic exercises  Plan of Care Expires: 08/23/23  Plan of Care Reviewed with: patient    Subjective     Chief Complaint: None  Patient/Family Comments/goals: To go home     Occupational Profile:  Living Environment: Pt lives in a [facility] c 2 steps to enter. Reported he has a tub.   Previous level of function: IND c ADLs and mobility   Equipment Used at Home: none  Assistance upon Discharge: Pt reported he will not have assistance at d/c. Pt receives  services.     Pain/Comfort:  Pain Rating 1: 0/10    Patients cultural, spiritual, Jehovah's witness conflicts given the current situation: no    Objective:     Communicated with: RN prior to session.  Patient found supine with  (Male external catheter) upon OT entry to room.    General Precautions: Standard, fall  Orthopedic Precautions: N/A  Braces: N/A  Respiratory Status: Room air    Occupational Performance:    Bed  Mobility:    Patient completed Scooting/Bridging with modified independence  Patient completed Supine to Sit with modified independence  Patient completed Sit to Supine with modified independence    Functional Mobility/Transfers:  Patient completed Sit <> Stand Transfer with contact guard assistance  with  rolling walker   Patient completed Toilet Transfer Step Transfer technique with contact guard assistance with  rolling walker  Functional Mobility: Pt ambulated to bathroom using RW c CGA.     Activities of Daily Living:  Lower Body Dressing: minimum assistance to doff/don socks while seated EOB. Pt reported he typically completes this task in supine.     Cognitive/Visual Perceptual:  Cognitive/Psychosocial Skills:     -       Mood/Affect/Coping skills/emotional control: Appropriate to situation and Cooperative    Physical Exam:  Upper Extremity Strength:    -       Right Upper Extremity: WFL  -       Left Upper Extremity: WFL    Therapeutic Positioning  Risk for acquired pressure injuries is decreased due to ability to get to BSC/toilet with assist.    OT interventions performed during the course of today's session in an effort to prevent and/or reduce acquired pressure injuries:   Therapeutic positioning completed     Skin assessment: all bony prominences were assessed    Findings: no redness or breakdown noted    OT recommendations for therapeutic positioning throughout hospitalization:   Follow [facility] Pressure Injury Prevention Protocol        Patient Education:  Patient provided with verbal education regarding OT role/goals/POC, fall prevention, and safety awareness.  Understanding was verbalized.     Patient left HOB elevated with call button in reach    GOALS:   Multidisciplinary Problems       Occupational Therapy Goals          Problem: Occupational Therapy    Goal Priority Disciplines Outcome Interventions   Occupational Therapy Goal     OT, PT/OT Ongoing, Progressing    Description: Goals to be met by:  8/23     Patient will increase functional independence with ADLs by performing:    LE Dressing with Modified [place].  Grooming while standing at sink with Modified [place].  Toileting from toilet with Modified [place] for hygiene and clothing management.   Toilet transfer to toilet with Modified [place].                         History:     Past Medical History:   Diagnosis Date    Skin cancer          Past Surgical History:   Procedure Laterality Date    BIOPSY OF THYROID Right        Time Tracking:     OT Date of Treatment: 08/09/23  OT Start Time: 1453  OT Stop Time: 1505  OT Total Time (min): 12 min    Billable Minutes:Evaluation Moderate complexity     8/9/2023

## 2023-08-09 NOTE — PROGRESS NOTES
Mr. Lundy verbalized understanding of all discharge instructions, follow-up appointment schedules, and s/s to be aware of that require immediate medical attention. All questions and concerns have been addressed. Education regarding inability to safely drive and strong suggestion to refrain from driving given to Mr. Lundy by PATRICK August. Mr. Lundy verbalized understanding but refused to comply with medical advice regarding driving limitations. IV and telemetry dc'd. Mr. Ludny is dressed, belongings are packed, and he is waiting for transportation to go downstairs to wait for self-called cab.

## 2023-08-09 NOTE — PLAN OF CARE
Problem: Adult Inpatient Plan of Care  Goal: Plan of Care Review  Outcome: Ongoing, Progressing  Goal: Patient-Specific Goal (Individualized)  Outcome: Ongoing, Progressing  Goal: Absence of Hospital-Acquired Illness or Injury  Outcome: Ongoing, Progressing  Goal: Optimal Comfort and Wellbeing  Outcome: Ongoing, Progressing  Goal: Readiness for Transition of Care  Outcome: Ongoing, Progressing     Problem: Infection  Goal: Absence of Infection Signs and Symptoms  Outcome: Ongoing, Progressing     Problem: Skin Injury Risk Increased  Goal: Skin Health and Integrity  Outcome: Ongoing, Progressing     Problem: Impaired Wound Healing  Goal: Optimal Wound Healing  Outcome: Ongoing, Progressing     Problem: Fall Injury Risk  Goal: Absence of Fall and Fall-Related Injury  Outcome: Ongoing, Progressing

## 2023-08-09 NOTE — PLAN OF CARE
D/C orders noted-Went to room and pt states he lives alone and has no fmly/friends/ neighbors that can pick him up. He did say he had his house key with him and also a railing to assist him to get up steps.  Contacted PT and they state that pt is mobile enough to safely get into his home.  Spoke to pt re: DME and he states will use [facility] in [place] so they can deliver to his home.  Pt also states that he has HH services with [facility]  ph# [phone].  Called [place] admin assist. With CM to schedule Hospital Van to transport home.  Plan d/c time 15:30    Order and info faxed to [facility]'s with request to deliver to pt's home.  Rec'd confirmation of transmission..    Nurse back from speaking with pt- he informs her that he does not want the van to take him to his home in [place], but rather to [facility] so he can get his car that is in parking Lot there.   CM contacted Dwain Ardon NP to question if pt was able to drive self home to [place].  Dwain Ardon PA came agin to discuss with pt that he does not feel  the pt can safely drive at this time.  Pt states he knows he can drive as he has been driving back and forth for treatment.  We asked again if pt could have someone retrieve his car for him and he states he has no one.  Dwain explicitly went over all the medical reasons why pt should not drive with pt and the pt continues to assist he can.       Dwain NP, CM x 2 and PT had a mtg and PT agrees that pt should not be driving at this time.  Dwain NGUYEN, Sissy CM and myself went back to room.  Dwain again advised the pt that he should not drive and his decision to do so is against medical advice.  Sissy reinforced and discussed, along with Dwain that his health is interfering with his ability to drive and it is not unreasonable to think that over time he will be to the point of more weakness and the pt needs to really think about a plan as he has no support at home.  Sissy approched NH placement and the pt opposed.   As pt is AAO x3 and is able to make his own decisions Sissy explained the hospital could not assist in providing transportation to get to [facility] to get in car and drive as Dwain NGUYEN deems this is against medical advice. If pt chooses to d/c and upon exit of building he calls a call a cab to take him to [facility] then it's his poor decision and we have no controil over that.   Dwain informed pt to make appt with his PCP Dr. Vargas and discuss if pt should continue to drive.    Referral sent to [facility] ph# [phone]- spoke to Octavia .  She confirmed that pt has no one to help him at all.  Informed of the conversation surrounding driving, food, etc.  She believes he gets MOW.  She states they will follow and if he is not able to manage his self care then will contact EPS.

## 2023-08-11 LAB
LEFT CCA DIST DIAS: 12 CM/S
LEFT CCA DIST SYS: 88 CM/S
LEFT CCA PROX DIAS: 13 CM/S
LEFT CCA PROX SYS: 78 CM/S
LEFT ECA DIAS: 3 CM/S
LEFT ECA SYS: 41 CM/S
LEFT ICA DIST DIAS: 14 CM/S
LEFT ICA DIST SYS: 46 CM/S
LEFT ICA MID DIAS: 16 CM/S
LEFT ICA MID SYS: 48 CM/S
LEFT ICA PROX DIAS: 18 CM/S
LEFT ICA PROX SYS: 58 CM/S
LEFT VERTEBRAL DIAS: 0 CM/S
LEFT VERTEBRAL SYS: 24 CM/S
OHS CV CAROTID RIGHT ICA EDV HIGHEST: 15
OHS CV CAROTID ULTRASOUND LEFT ICA/CCA RATIO: 0.66
OHS CV CAROTID ULTRASOUND RIGHT ICA/CCA RATIO: 0.63
OHS CV PV CAROTID LEFT HIGHEST CCA: 88
OHS CV PV CAROTID LEFT HIGHEST ICA: 58
OHS CV PV CAROTID RIGHT HIGHEST CCA: 108
OHS CV PV CAROTID RIGHT HIGHEST ICA: 43
OHS CV US CAROTID LEFT HIGHEST EDV: 18
RIGHT CCA DIST DIAS: 17 CM/S
RIGHT CCA DIST SYS: 68 CM/S
RIGHT CCA PROX DIAS: 22 CM/S
RIGHT CCA PROX SYS: 108 CM/S
RIGHT ECA DIAS: 5 CM/S
RIGHT ECA SYS: 63 CM/S
RIGHT ICA DIST DIAS: 11 CM/S
RIGHT ICA DIST SYS: 37 CM/S
RIGHT ICA MID DIAS: 15 CM/S
RIGHT ICA MID SYS: 43 CM/S
RIGHT ICA PROX DIAS: 12 CM/S
RIGHT ICA PROX SYS: 42 CM/S
RIGHT VERTEBRAL DIAS: 4 CM/S
RIGHT VERTEBRAL SYS: 28 CM/S
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
## 2023-08-15 PROBLEM — R29.6 FALLS: Status: ACTIVE | Noted: 2023-08-15

## 2023-08-16 ENCOUNTER — INFUSION (OUTPATIENT)
Dept: INFUSION THERAPY | Facility: HOSPITAL | Age: 88
End: 2023-08-16
Attending: INTERNAL MEDICINE
Payer: MEDICARE

## 2023-08-16 ENCOUNTER — OFFICE VISIT (OUTPATIENT)
Dept: HEMATOLOGY/ONCOLOGY | Facility: CLINIC | Age: 88
End: 2023-08-16
Attending: INTERNAL MEDICINE
Payer: MEDICARE

## 2023-08-16 VITALS
SYSTOLIC BLOOD PRESSURE: 142 MMHG | HEART RATE: 68 BPM | TEMPERATURE: 98 F | OXYGEN SATURATION: 99 % | DIASTOLIC BLOOD PRESSURE: 84 MMHG | RESPIRATION RATE: 20 BRPM

## 2023-08-16 VITALS
HEART RATE: 84 BPM | SYSTOLIC BLOOD PRESSURE: 120 MMHG | TEMPERATURE: 98 F | RESPIRATION RATE: 20 BRPM | WEIGHT: 157.63 LBS | OXYGEN SATURATION: 99 % | BODY MASS INDEX: 21.35 KG/M2 | HEIGHT: 72 IN | DIASTOLIC BLOOD PRESSURE: 70 MMHG

## 2023-08-16 DIAGNOSIS — C78.01 SECONDARY MALIGNANCY OF RIGHT LUNG: ICD-10-CM

## 2023-08-16 DIAGNOSIS — C44.329 SQUAMOUS CELL CARCINOMA OF SKIN OF CHEEK: ICD-10-CM

## 2023-08-16 DIAGNOSIS — C07 ADENOCARCINOMA OF PAROTID GLAND: Primary | ICD-10-CM

## 2023-08-16 DIAGNOSIS — E04.2 MULTINODULAR GOITER: ICD-10-CM

## 2023-08-16 DIAGNOSIS — C78.01 SECONDARY ADENOCARCINOMA OF RIGHT LUNG: ICD-10-CM

## 2023-08-16 DIAGNOSIS — S06.5XAA SDH (SUBDURAL HEMATOMA): ICD-10-CM
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
## 2023-08-16 DIAGNOSIS — C07 CARCINOMA OF PAROTID GLAND: ICD-10-CM

## 2023-08-16 DIAGNOSIS — R91.8 LUNG NODULE, MULTIPLE: ICD-10-CM

## 2023-08-16 LAB
INR PPP: 1.1
PROTHROMBIN TIME: 14 SECONDS (ref 11.4–14)

## 2023-08-16 PROCEDURE — 99215 OFFICE O/P EST HI 40 MIN: CPT | Mod: S$PBB,,, | Performed by: INTERNAL MEDICINE

## 2023-08-16 PROCEDURE — 99215 PR OFFICE/OUTPT VISIT, EST, LEVL V, 40-54 MIN: ICD-10-PCS | Mod: S$PBB,,, | Performed by: INTERNAL MEDICINE

## 2023-08-16 PROCEDURE — 99213 OFFICE O/P EST LOW 20 MIN: CPT | Mod: PBBFAC | Performed by: INTERNAL MEDICINE

## 2023-08-16 RX ORDER — SODIUM CHLORIDE 0.9 % (FLUSH) 0.9 %
10 SYRINGE (ML) INJECTION
Status: DISCONTINUED | OUTPATIENT
Start: 2023-08-16 | End: 2023-08-16 | Stop reason: HOSPADM

## 2023-08-16 RX ORDER — SODIUM CHLORIDE 0.9 % (FLUSH) 0.9 %
10 SYRINGE (ML) INJECTION
OUTPATIENT
Start: 2023-08-23

## 2023-08-16 RX ORDER — HEPARIN 100 UNIT/ML
500 SYRINGE INTRAVENOUS
Status: DISCONTINUED | OUTPATIENT
Start: 2023-08-16 | End: 2023-08-16 | Stop reason: HOSPADM

## 2023-08-16 RX ORDER — HEPARIN 100 UNIT/ML
500 SYRINGE INTRAVENOUS
OUTPATIENT
Start: 2023-08-23

## 2023-08-16 NOTE — PROGRESS NOTES
History:  Past Medical History:   Diagnosis Date    Skin cancer    Social history:  Single.  No children.  Lives in [place], [place].  Drinks 4 cans of beer over the weekend.  No tobacco, alcohol, or illicit drug abuse. He unfortunately has several social factors that may affect his care including the fact that he is of older age, lives alone, can not always hear his phone, and has no surroudning family or friends to help. He has a car that he can drive, but has no one to drive him to and from appointments where he may need sedation.    Family history:  Mother experience some kind of skin cancer in her 90s.  Several uncles had unknown kind of cancer.    Health maintenance:  Primary care provider in [place], [place].  Past Surgical History:   Procedure Laterality Date    BIOPSY OF THYROID Right       Social History     Socioeconomic History    Marital status: Single   Tobacco Use    Smoking status: Never     Passive exposure: Never    Smokeless tobacco: Never   Substance and Sexual Activity    Alcohol use: Yes     Alcohol/week: 3.0 standard drinks of alcohol     Types: 3 Cans of beer per week     Comment: 3 cans of beer on the weekend    Drug use: Never    Sexual activity: Not Currently      No family history on file.     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        Oncologic/Hematologic History:  Oncology History   Adenocarcinoma of parotid gland   5/25/2023 Cancer Staged    Staging form: Major Salivary Glands, AJCC 8th Edition  - Clinical stage from 5/25/2023: Stage IVC (cT4a, cN0, pM1)      6/10/2023 Initial Diagnosis    Adenocarcinoma of parotid gland     6/28/2023 -  Chemotherapy    Treatment Summary   Plan Name: OP PACLITAXEL CARBOPLATIN (AUC 5) Q3W  Treatment Goal: Palliative  Status: Active  Start Date: 6/28/2023  End Date: 5/29/2024 (Planned)  Provider: Tony Kong MD  Chemotherapy: CARBOplatin (PARAPLATIN) 320 mg in sodium chloride 0.9% 317 mL chemo infusion, 320 mg (100 % of original dose 321.6 mg), Intravenous, Clinic/HOD 1 time, 2 of 17 cycles  Dose modification:   (original dose 321.6 mg, Cycle 1)  Administration: 320 mg (6/28/2023)  PACLitaxeL (TAXOL) 175 mg/m2 = 342 mg in sodium chloride 0.9% 622 mL chemo infusion, 175 mg/m2 = 342 mg, Intravenous, Clinic/HOD 1 time, 2 of 17 cycles  Administration: 342 mg (6/28/2023)     87-year-old gentleman, referred from [facility], with metastatic adenocarcinoma of parotid gland, with lung metastases (proven with biopsy).      Erik Lundy is a 87 y.o. 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 Esquivel with Mohs resection.  He was recently referred to [facility] for evaluation of a left neck mass, present for a month.    FNA of left parotid mass resulted in poorly-differentiated carcinoma  Reported 7 lb weight loss over 2 months  Denied dysphagia, odynophagia, dysphonia, dyspnea, hematemesis, hemoptysis, focal weakness, new skin lesions or additional masses in head or neck area.    Physical exam by ENT on 05/31/2023: 3.5 cm firm mass involving left tail of parotid, fixed to the adjacent sternocleidomastoid muscle/masseter muscle as well as overlying skin; 3 new subcentimeter nodules fixed to the skin in the left postauricular skin just superior to this area; also a hypopigmented skin lesion inferior to the mass; no palpable lymphadenopathy; nontender; no thyroid nodules or goiter  Developed some numbness in the periauricular area on the left for 2-3 weeks    Imaging studies/biopsy reports as  below:      01/19/2023:  FNA without imaging, left superior level 5:   -pos for malignant cells; poorly-differentiated carcinoma (consistent with a poorly-differentiated carcinoma in which a high-grade primary parotid malignancy or metastatic carcinoma is in the differential)    -01/24/2023:  CT neck without contrast (evaluation of mass left side of neck):  1.9 x 2.0 x 2.4 cm mass left parotid gland  Multiple thyroid nodules measuring up to 1.2 cm  Small nodules left upper lung lobe met, measuring up to 2 mm    02/16/2023: FDG PET-CT (evaluation of poorly-differentiated carcinoma involving left parotid gland) (comparison:  Outside CT neck 01/24/2023):  1. Hypermetabolic left parotid nodule (1.9 x 2 cm, maximum SUV 12.8)  2. Bilateral hypermetabolic upper lobe pulmonary nodules (spiculated right upper lung lobe nodule 1.9 cm, maximum SUV 4.8; left upper lobe lung nodule 9 mm, maximum SUV 2.9)  3. Suspected hypermetabolic right thyroid lobe nodule.    03/10/2023: Ultrasound thyroid (evaluation of nontoxic single thyroid nodule):   Mostly cystic nodules in the right and left alyson thyroid with exception of a single solid slightly hypoechoic nodule 14 x 9 x 11 mm corresponding to a TI-RADS type 4 nodule    03/10/2023: CT chest with contrast (PET pos lung nodules, concerning for metastases from left parotid gland malignancy) (comparison:  PET-CT 02/16/2023):  Bilateral lung nodules overall similar to the prior examination.  Findings are consistent with metastatic disease.  (Spiculated nodule right upper lung lobe, 1.9 x 1.6 cm; parenchymal nodularity left upper lobe; spiculated nodule lingula, 9 mm x 9 mm; new atelectasis right lower lung lobe; scattered punctate 1-2 mm nodules right lower lobe; no significant mediastinal lymphadenopathy)  Thyroid nodules    03/10/2023: CT temporal bone with contrast (evaluation of left parotid gland malignant neoplasm):  Unremarkable temporal bone CT    03/10/2023: CT soft tissue of the  neck with contrast (evaluation of malignant neoplasm of parotid gland) (comparison:  PET-CT 02/16/2023):  2.5 cm left parotid tail mass; no cervical lymphadenopathy (left parotid tail mass 1.8 x 2.5 cm, abuts the skin surface; no definitive evidence of perineural spread)    03/23/2023:  FNA right thyroid nodule:  -cytomorphology of a benign thyroid nodule    05/25/2023:  Right upper lung lobe nodule, CT-guided needle core biopsy:  -scant fragments of-scant fragment of adenocarcinoma (history of poorly-differentiated carcinoma of parotid gland is noted; a metastasis is favored)    06/12/2023:   Presents for initial medical oncology consultation.  Pleasant gentleman.  In no acute discomfort.    Very hard of hearing.    Overall, feels well.  ECOG 1.  Some generalized weakness.  Some trouble swallowing.    Has left parotid gland tumor; mildly to moderately painful.  No bleeding or ulceration.  No unusual headaches or focal neurological symptoms.       OP PACLITAXEL CARBOPLATIN (AUC 5) Q3W     08/16/2023:   -07/18/2023:  Left parotid gland tumor is already shrinking post cycle 1 of chemotherapy; did not experience any side effects with chemotherapy; ECOG 0-1  08/16/2023:   -cycle 2 of carboplatin/Taxol on 07/18/2023 (Neulasta support from cycle 2 onwards)  -08/08/2023:  Patient had arrived for scheduled appointment today appearing disheveled and very weak. Patient transported to emergency department for medical evaluation.  Patient t was seen by home health nurse on Monday 8/7/23 with blood pressure of 70/48, pulse 112, temp 99.1; patient reporting he has had falls in the home. Reports that the nurse directed patient to go to ER to be evaluated.  Home health nurse reports patient does have a working AC but keeps it warm in the home. Patient does have appropriate running water. Patient has had home health SW visits to evaluate safety and patient needs.  PADMINI Blair addressed with home health the issue of patient's self-neglect  and if a report to [employer] is needed.  Per , patient is in his right mind but there are concerns about his falls in the home. PADMINI Blair contacted CHERRIE Prakash in outpatient CM to inform of above and discuss options. CHERRIE Prakash feels that [employer] may need to be called to evaluate patient's situation.  ER  was notified.  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 Some increase interstitial markings in the left upper lobe also unchanged as compared with the previous exam. Otherwise no active pulmonary disease  -08/08/2023: CT head without contrast (dizziness, persistent/recurrent) (comparison:  MRI 06/29/2023): Hypoattenuating subdural collection over the right frontal lobe most suggestive of chronic subdural hematoma (measures 9 mm; was not evident on prior MRI; mild mass effect on the underlying brain; no midline shift; no convincing acute hemorrhage).  No significant mass effect.  This is new compared to June 2023.  -08/09/2020:  TTE: LVEF 60-65%  Presents for a follow-up visit.  Conversation was held in the presence of Lata Villavicencio, our nurse navigator.  He is very hard of hearing.  Ambulates with the help of walker.  Insists that he is doing well at home.  However, home Health and other services' accounts or at variance with this.  His social for.  He lives by himself.  History of falls.  Recent brain imaging showed chronic subdural hematoma.  Weakness and fatigue.  Fixes his own meals.  Palpitations, trouble swallowing,  constipation, tingling.  He has noted that he fell on 08/01/2023 at home and on 08/08/2023 on weight the ER.  Says that his main problem is asthma which is causing air to become trapped in his stomach which is unable to bowel sounds.  Blood pressure fluctuates.  No fevers or chills.  No nausea or vomiting.  Left parotid tumor has shrunk considerably post 2 cycles of chemotherapy.  He wishes to have scans done to check the status of lung metastases as well.      Medications:  Current Outpatient Medications on File Prior to Visit   Medication Sig Dispense Refill    dexAMETHasone (DECADRON) 4 MG Tab Take 2 tablets(8 mg) once daily on days 2,3, and 4 of chemotherapy cycle. 24 tablet 1    ondansetron (ZOFRAN) 8 MG tablet Take 1 tablet (8 mg total) by mouth every 8 (eight) hours as needed for Nausea (Nausea). 30 tablet 2    potassium chloride SA (K-DUR,KLOR-CON) 20 MEQ tablet Take 2 tablets (40 mEq total) by mouth once daily. for 14 days 28 tablet 0    tamsulosin (FLOMAX) 0.4 mg Cap Take 1 capsule (0.4 mg total) by mouth every evening. 30 capsule 2    trimethoprim (TRIMPEX) 100 mg Tab Take 100 mg by mouth once daily.       Current Facility-Administered Medications on File Prior to Visit   Medication Dose Route Frequency Provider Last Rate Last Admin    sodium chloride 0.9% flush 10 mL  10 mL Intravenous PRN Compa Garcia MD           Review of Systems:   All systems reviewed and found to be negative except for the symptoms detailed above    Physical Examination:   VITAL SIGNS:   There were no vitals filed for this visit.      GENERAL:  In no apparent distress.    HEAD:  No signs of head trauma.  EYES:  Pupils are equal.  Extraocular motions intact.    EARS:  Hearing grossly intact.  MOUTH:  Oropharynx is normal.   NECK:  No adenopathy, no JVD.     CHEST:  Chest with clear breath sounds bilaterally.  No wheezes, rales, rhonchi.    CARDIAC:  Regular rate and rhythm.  S1 and S2, without murmurs, gallops, rubs.  VASCULAR:   "No Edema.  Peripheral pulses normal and equal in all extremities.  ABDOMEN:  Soft, without detectable tenderness.  No sign of distention.  No   rebound or guarding, and no masses palpated.   Bowel Sounds normal.  MUSCULOSKELETAL:  Good range of motion of all major joints. Extremities withouta clubbing, cyanosis or edema.    NEUROLOGIC EXAM:  Alert and oriented x 3.  No focal sensory or strength deficits.   Speech normal.  Follows commands.  PSYCHIATRIC:  Mood normal.  06/12/2023: In no acute discomfort.  Left parotid gland tumor is noted, mildly tender, approximately 4 cm x 4 cm, with a dark spot at the inferior aspect, without superficial ulceration or bleeding.  08/16/2023: Ambulates with the help of a walker.  No grossly apparent focal deficit.  Extremely hard of hearing.  Left parotid mass has shrunk considerably.    No results for input(s): "CBC" in the last 72 hours.   No results for input(s): "CMP" in the last 72 hours.     Assessment:  Problem List Items Addressed This Visit    None    Metastatic poorly-differentiated carcinoma/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:   -presentation:  Left neck mass for a month  -on physical exam by ENT 05/31/2023:  3.5 cm firm mass left tail of parotid, fixed to the adjacent sternocleidomastoid muscle/masseter muscle as well as overlying skin; 3 new subcentimeter nodules fixed to the skin in the left post auricular skin; hypopigmented skin lesion inferior to the mass as well  -FNA left superior level 5 mass 01/19/2023:  Poorly-differentiated carcinoma, consistent with a poorly-differentiated carcinoma in which a high-grade primary parotid malignancy or metastatic carcinoma is in the differential   -left parotid gland mass 1.9 x 2.0 x 2.4 cm on noncontrast CT neck 01/24/2023  -hypermetabolic left parotid nodule 1.9 x 2 cm on FDG PET-CT 02/16/2023  -2.5 cm left parotid tail mass on CT neck with contrast 03/10/2023, abutting the skin  -no cervical " lymphadenopathy  ----------  -small nodules left upper lung lobe, up to 2 mm, on noncontrast CT neck 01/24/2023  -bilateral hypermetabolic upper lobe lung nodules on FDG PET-CT 02/16/2023, largest nodule right upper lung lobe 1.9 cm  Bilateral lung nodules, similar, on contrast-enhanced chest CT 03/10/2023 (largest nodule right upper lung lobe, 1.9 cm)  -right upper lung lobe lung lobe nodule CT-guided needle core biopsy 05/25/2023:  Adenocarcinoma (a metastases from poorly-differentiated carcinoma of parotid gland, is favored)  ---------  -by virtue of skin involvement, cT4a  -cN0  -lung metastases, therefore, M1  >> cT4 a cN0 M1, stage IVC  -palliative chemotherapy with carboplatin/Taxol started 06/28/2023   -baseline chest CT with contrast and brain MRI with contrast 06/29/2023   -severe neutropenia, ANC 0.37 K, on 07/06/2023, 8 days post cycle 1 of chemotherapy; treated and resolved uneventfully  -07/18/2023:  Left parotid gland tumor is already shrinking post cycle 1 of chemotherapy; did not experience any side effects with chemotherapy; ECOG 0-1  08/16/2023:   -cycle 2 of carboplatin/Taxol on 07/18/2023 (Neulasta support from cycle 2 onwards)  -08/08/2023:  Patient had arrived for scheduled appointment today appearing disheveled and very weak. Patient transported to emergency department for medical evaluation.  Patient t was seen by home health nurse on Monday 8/7/23 with blood pressure of 70/48, pulse 112, temp 99.1; patient reporting he has had falls in the home. Reports that the nurse directed patient to go to ER to be evaluated.  Home health nurse reports patient does have a working AC but keeps it warm in the home. Patient does have appropriate running water. Patient has had home health SW visits to evaluate safety and patient needs.  RN Johnnie addressed with home health the issue of patient's self-neglect and if a report to [employer] is needed.  Per , patient is in his right mind but  there are concerns about his falls in the home. PADMINI Blair contacted CHERRIE Prakash in outpatient CM to inform of above and discuss options. CHERRIE Prakash feels that [employer] may need to be called to evaluate patient's situation.  ER  was notified.  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 Some increase interstitial markings in the left upper lobe also unchanged as compared with the previous exam. Otherwise no active pulmonary disease  -08/08/2023: CT head without contrast (dizziness, persistent/recurrent) (comparison:  MRI 06/29/2023): Hypoattenuating subdural collection over the right frontal lobe most suggestive of chronic subdural hematoma (measures 9 mm; was not evident on prior MRI; mild mass effect on the underlying brain; no midline shift; no convincing acute hemorrhage).  No significant mass effect.  This is new compared to June 2023.  -08/09/2020:  TTE: LVEF 60-65%      Molecular testing (cancer gene mutation panel by NGS  (Performed on right lung biopsy dated 05/25/2023):  Negative for ALK, RET, NTRK1/2/3, RNA gene fusions;   nondiagnostic study (unable to amplified DNA);   PD-L1 expression negative based on both TPS (< 1%) and CPS (<1);   negative androgen receptor by IHC      Past 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 Esquivel), with Mohs resection      Benign thyroid nodule:  -suspected hypermetabolic right thyroid nodule on FDG PET-CT 02/16/2023  -single solid 14 x 9 x 11 mm right thyroid nodule,  TI-RADS 4, on thyroid ultrasound 03/10/2023  -FNA right thyroid nodule 03/23/2023: Benign thyroid nodule         Plan:  Please order contrast-enhanced CT scans of C/A/P and MRI scan of soft tissues of the neck at this time to assess response to chemotherapy  Discontinue chemotherapy.  Follow-up in 2 weeks, with scans  -----------------------      Has lung metastases, proven with biopsy  Needs palliative systemic therapy  Expectant management for slow-growing disease, is also an option  Palliative chemotherapy with carboplatin/Taxol every 3 weeks, started 06/28/2023  Severe neutropenia, ANC 0.37 K, 8 days post chemotherapy  -07/18/2023:  Left parotid gland tumor is already shrinking post cycle 1 of chemotherapy; did not experience any side effects with chemotherapy; ECOG 0-1  -cycle 2 of carboplatin/Taxol on 07/18/2023 (Neulasta support from cycle 2 onwards)  -08/08/2023:  Patient disheveled and very weak.  On 08/07/2023, found hypotensive by home health nurse.  Poor social situation.  Self neglect.  Falls at home.  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  No significant mass effect.  This is new compared to June 2023.  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  All questions answered.  He does not want services of hospice at this time.  He appears to be amenable to continued services of home health and maybe, palliative services.  He wishes to have restaging scans done which I have ordered.    Will follow-up in 2 weeks to go over the scans.    At this time, he is amenable to discontinue chemotherapy because of concerns with his ability handle side effects of chemotherapy as well as to prevent weakness, hypotension, and further falls which may cause catastrophic intracranial bleeding.  ------------------      Previous plan:  Hematopoietic growth factor support with Neulasta from cycle 2 of chemotherapy   Chemotherapy every 3 weeks until disease progression or unacceptable toxicity  Re-stage with contrast enhanced CT scan of chest and MRI scan of soft tissues of the neck with contrast 2 months (late August/early September) after starting chemotherapy  Check CBC and CMP weekly    Systemic palliative chemotherapy options:  -Cisplatin/Navelbine  -cisplatin/doxorubicin/cyclophosphamide (category 2B)  -paclitaxel (category 2A for non adenoid cystic carcinoma)  -carboplatin/Taxol  -carboplatin/gemcitabine  >>>  We decided to treat him with carboplatin Taxol to start with    Carboplatin/Taxol every 3 weeks, options:  -paclitaxel 175 mg per m2 IV over 3 hours on day 1, followed by  -carboplatin AUC 5.5 IV over 30 minutes on day 1  Repeat cycle every 21 days until disease progression or unacceptable toxicity  Or,   -paclitaxel 200 mg per m2 IV over 3 hours on day 1, followed by  -carboplatin AUC 6 IV over 30 minutes on day 1    Repeat cycle every 21 days until disease progression or unacceptable toxicity    Useful in certain circumstances:   -androgen receptor therapy for AR pos tumors: Leuprolide; bicalutamide  -NTRK therapy for NTRK gene fusion pos tumors: Larotrectinib; entrectinib  -HER2 targeted therapy for HER2 pos tumors: Trastuzumab; ado trastuzumab emtansine; trastuzumab/pertuzumab; docetaxel/trastuzumab; Fam-trastuzumab deruxtecan (category 2B)  -axitinib, sorafenib: Category 2B  -lenvatinib (category 2B) for adenoid cystic carcinoma  -pembrolizumab (for microsatellite instability-high, i.e., MSI-H, MMR deficient, i.e., dMMR, TMB-H (=/> 10 mut/Mb)   -dabrafenib/trametinib for BRAF V600 E pos tumors  -Selpercatinib for RET gene fusion pos tumors  >>>  Molecular testing (cancer gene mutation panel by NGS) on right lung biopsy dated 05/25/2023:  Negative for ALK, RET, NTRK1/2/3, RNA gene fusions;   nondiagnostic study (unable to amplified DNA);   PD-L1 expression negative based on both TPS (< 1%) and CPS (<1);   negative androgen receptor by IHC  >>>  Nondiagnostic study on tissue (lung metastasis)  Will order liquid testing    Above discussed at length with the patient.  All questions answered.    He understands and agrees with this plan.    Follow-up:  No follow-ups on file.  a

## 2023-08-16 NOTE — Clinical Note
Please order contrast-enhanced CT scans of C/A/P and MRI scan of soft tissues of the neck at this time to assess response to chemotherapy Discontinue chemotherapy. Follow-up in 2 weeks, with scans

## 2023-08-16 NOTE — NURSING
1445 Pt arrived from clinic with verbal orders for PICC dressing change as pt pending a CT scan; PICC line flush orders released    1452 - received call from clinic to pull pt's PICC line today per telephone verbal order Dr Kong; placed order to pull PICC today.

## 2023-08-18 LAB — BEAKER SEE SCANNED REPORT: NORMAL

## 2023-09-05 ENCOUNTER — HOSPITAL ENCOUNTER (OUTPATIENT)
Dept: RADIOLOGY | Facility: HOSPITAL | Age: 88
Discharge: HOME | End: 2023-09-05
Attending: INTERNAL MEDICINE
Payer: MEDICARE

## 2023-09-05 ENCOUNTER — TELEPHONE (OUTPATIENT)
Dept: NEUROSURGERY | Facility: CLINIC | Age: 88
End: 2023-09-05
Payer: MEDICARE

## 2023-09-05 DIAGNOSIS — R22.1 MASS OF NECK: ICD-10-CM

## 2023-09-05 DIAGNOSIS — R91.8 LUNG MASS: ICD-10-CM

## 2023-09-05 DIAGNOSIS — I49.9 IRREGULAR HEART BEATS: ICD-10-CM

## 2023-09-05 DIAGNOSIS — C78.00 METASTASIS TO LUNG: ICD-10-CM

## 2023-09-05 LAB
CREAT SERPL-MCNC: 1.52 MG/DL (ref 0.73–1.18)
GFR SERPLBLD CREATININE-BSD FMLA CKD-EPI: 44 MLS/MIN/1.73/M2

## 2023-09-05 PROCEDURE — A9577 INJ MULTIHANCE: HCPCS

## 2023-09-05 PROCEDURE — 70543 MRI ORBT/FAC/NCK W/O &W/DYE: CPT | Mod: TC

## 2023-09-05 PROCEDURE — 71260 CT THORAX DX C+: CPT | Mod: TC

## 2023-09-05 PROCEDURE — 82565 ASSAY OF CREATININE: CPT | Performed by: INTERNAL MEDICINE

## 2023-09-05 PROCEDURE — 25500020 PHARM REV CODE 255: Performed by: INTERNAL MEDICINE

## 2023-09-05 PROCEDURE — 25500020 PHARM REV CODE 255

## 2023-09-05 RX ADMIN — GADOBENATE DIMEGLUMINE 15 ML: 529 INJECTION, SOLUTION INTRAVENOUS at 10:09

## 2023-09-05 RX ADMIN — IOHEXOL 100 ML: 350 INJECTION, SOLUTION INTRAVENOUS at 11:09

## 2023-09-08 ENCOUNTER — DOCUMENT SCAN (OUTPATIENT)
Dept: HOME HEALTH SERVICES | Facility: HOSPITAL | Age: 88
End: 2023-09-08
Payer: MEDICARE

## 2023-09-08 ENCOUNTER — OFFICE VISIT (OUTPATIENT)
Dept: HEMATOLOGY/ONCOLOGY | Facility: CLINIC | Age: 88
End: 2023-09-08
Attending: INTERNAL MEDICINE
Payer: MEDICARE

## 2023-09-08 VITALS
BODY MASS INDEX: 21.48 KG/M2 | TEMPERATURE: 98 F | RESPIRATION RATE: 20 BRPM | OXYGEN SATURATION: 99 % | HEART RATE: 69 BPM | HEIGHT: 72 IN | WEIGHT: 158.63 LBS | DIASTOLIC BLOOD PRESSURE: 80 MMHG | SYSTOLIC BLOOD PRESSURE: 136 MMHG

## 2023-09-08 DIAGNOSIS — C44.329 SQUAMOUS CELL CARCINOMA OF SKIN OF CHEEK: ICD-10-CM

## 2023-09-08 DIAGNOSIS — C78.01 SECONDARY ADENOCARCINOMA OF RIGHT LUNG: ICD-10-CM

## 2023-09-08 DIAGNOSIS — C79.2 CARCINOMATOUS METASTASIS IN SKIN: ICD-10-CM
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
## 2023-09-08 DIAGNOSIS — C07 ADENOCARCINOMA OF PAROTID GLAND: Primary | ICD-10-CM

## 2023-09-08 DIAGNOSIS — R91.8 LUNG NODULE, MULTIPLE: ICD-10-CM

## 2023-09-08 DIAGNOSIS — S06.5XAA SDH (SUBDURAL HEMATOMA): ICD-10-CM

## 2023-09-08 DIAGNOSIS — E04.1 THYROID NODULE: ICD-10-CM

## 2023-09-08 PROCEDURE — 99213 OFFICE O/P EST LOW 20 MIN: CPT | Mod: PBBFAC | Performed by: INTERNAL MEDICINE

## 2023-09-08 PROCEDURE — 99214 OFFICE O/P EST MOD 30 MIN: CPT | Mod: S$PBB,,, | Performed by: INTERNAL MEDICINE

## 2023-09-08 PROCEDURE — 99214 PR OFFICE/OUTPT VISIT, EST, LEVL IV, 30-39 MIN: ICD-10-PCS | Mod: S$PBB,,, | Performed by: INTERNAL MEDICINE

## 2023-09-08 NOTE — Clinical Note
Refer to Radiation Oncology for consideration of palliative radiotherapy to left parotid mass.   No follow-up with us. Follow-up with ENT and PCP.

## 2023-09-08 NOTE — PROGRESS NOTES
History:  Past Medical History:   Diagnosis Date    Skin cancer    Social history:  Single.  No children.  Lives in [place], [place].  Drinks 4 cans of beer over the weekend.  No tobacco, alcohol, or illicit drug abuse. He unfortunately has several social factors that may affect his care including the fact that he is of older age, lives alone, can not always hear his phone, and has no surroudning family or friends to help. He has a car that he can drive, but has no one to drive him to and from appointments where he may need sedation.    Family history:  Mother experience some kind of skin cancer in her 90s.  Several uncles had unknown kind of cancer.    Health maintenance:  Primary care provider in [place], [place].  Past Surgical History:   Procedure Laterality Date    BIOPSY OF THYROID Right       Social History     Socioeconomic History    Marital status: Single   Tobacco Use    Smoking status: Never     Passive exposure: Never    Smokeless tobacco: Never   Substance and Sexual Activity    Alcohol use: Yes     Alcohol/week: 3.0 standard drinks of alcohol     Types: 3 Cans of beer per week     Comment: 3 cans of beer on the weekend    Drug use: Never    Sexual activity: Not Currently      No family history on file.     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, S/P WLE in 2016, S/P Mohs resection  -falls; subdural hematoma  -benign thyroid nodule right thyroid lobe      History of Present Illness:   Parotid gland (Adenocarcinoma of parotid gland)        Oncologic/Hematologic History:  Oncology History   Adenocarcinoma of parotid gland   5/25/2023 Cancer Staged    Staging form: Major Salivary Glands, AJCC 8th Edition  - Clinical stage  from 5/25/2023: Stage IVC (cT4a, cN0, pM1)     6/10/2023 Initial Diagnosis    Adenocarcinoma of parotid gland     6/28/2023 -  Chemotherapy    Treatment Summary   Plan Name: OP PACLITAXEL CARBOPLATIN (AUC 5) Q3W  Treatment Goal: Palliative  Status: Active  Start Date: 6/28/2023  End Date: 5/29/2024 (Planned)  Provider: Tony Kong MD  Chemotherapy: CARBOplatin (PARAPLATIN) 320 mg in sodium chloride 0.9% 317 mL chemo infusion, 320 mg (100 % of original dose 321.6 mg), Intravenous, Clinic/HOD 1 time, 2 of 17 cycles  Dose modification:   (original dose 321.6 mg, Cycle 1)  Administration: 320 mg (6/28/2023)  PACLitaxeL (TAXOL) 175 mg/m2 = 342 mg in sodium chloride 0.9% 622 mL chemo infusion, 175 mg/m2 = 342 mg, Intravenous, Clinic/HOD 1 time, 2 of 17 cycles  Administration: 342 mg (6/28/2023)     87-year-old gentleman, referred from [facility], with metastatic adenocarcinoma of parotid gland, with lung metastases (proven with biopsy).      Erik Lundy is a 87 y.o. 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 Esquivel with Mohs resection.  He was recently referred to [facility] for evaluation of a left neck mass, present for a month.    FNA of left parotid mass resulted in poorly-differentiated carcinoma  Reported 7 lb weight loss over 2 months  Denied dysphagia, odynophagia, dysphonia, dyspnea, hematemesis, hemoptysis, focal weakness, new skin lesions or additional masses in head or neck area.    Physical exam by ENT on 05/31/2023: 3.5 cm firm mass involving left tail of parotid, fixed to the adjacent sternocleidomastoid muscle/masseter muscle as well as overlying skin; 3 new subcentimeter nodules fixed to the skin in the left postauricular skin just superior to this area; also a hypopigmented skin lesion inferior to the mass; no palpable lymphadenopathy; nontender; no thyroid nodules or goiter  Developed some numbness in the periauricular area on the left for 2-3  weeks    Imaging studies/biopsy reports as below:      01/19/2023:  FNA without imaging, left superior level 5:   -pos for malignant cells; poorly-differentiated carcinoma (consistent with a poorly-differentiated carcinoma in which a high-grade primary parotid malignancy or metastatic carcinoma is in the differential)    -01/24/2023:  CT neck without contrast (evaluation of mass left side of neck):  1.9 x 2.0 x 2.4 cm mass left parotid gland  Multiple thyroid nodules measuring up to 1.2 cm  Small nodules left upper lung lobe met, measuring up to 2 mm    02/16/2023: FDG PET-CT (evaluation of poorly-differentiated carcinoma involving left parotid gland) (comparison:  Outside CT neck 01/24/2023):  1. Hypermetabolic left parotid nodule (1.9 x 2 cm, maximum SUV 12.8)  2. Bilateral hypermetabolic upper lobe pulmonary nodules (spiculated right upper lung lobe nodule 1.9 cm, maximum SUV 4.8; left upper lobe lung nodule 9 mm, maximum SUV 2.9)  3. Suspected hypermetabolic right thyroid lobe nodule.    03/10/2023: Ultrasound thyroid (evaluation of nontoxic single thyroid nodule):   Mostly cystic nodules in the right and left alyson thyroid with exception of a single solid slightly hypoechoic nodule 14 x 9 x 11 mm corresponding to a TI-RADS type 4 nodule    03/10/2023: CT chest with contrast (PET pos lung nodules, concerning for metastases from left parotid gland malignancy) (comparison:  PET-CT 02/16/2023):  Bilateral lung nodules overall similar to the prior examination.  Findings are consistent with metastatic disease.  (Spiculated nodule right upper lung lobe, 1.9 x 1.6 cm; parenchymal nodularity left upper lobe; spiculated nodule lingula, 9 mm x 9 mm; new atelectasis right lower lung lobe; scattered punctate 1-2 mm nodules right lower lobe; no significant mediastinal lymphadenopathy)  Thyroid nodules    03/10/2023: CT temporal bone with contrast (evaluation of left parotid gland malignant neoplasm):  Unremarkable temporal  bone CT    03/10/2023: CT soft tissue of the neck with contrast (evaluation of malignant neoplasm of parotid gland) (comparison:  PET-CT 02/16/2023):  2.5 cm left parotid tail mass; no cervical lymphadenopathy (left parotid tail mass 1.8 x 2.5 cm, abuts the skin surface; no definitive evidence of perineural spread)    03/23/2023:  FNA right thyroid nodule:  -cytomorphology of a benign thyroid nodule    05/25/2023:  Right upper lung lobe nodule, CT-guided needle core biopsy:  -scant fragments of-scant fragment of adenocarcinoma (history of poorly-differentiated carcinoma of parotid gland is noted; a metastasis is favored)    06/12/2023:   Presents for initial medical oncology consultation.  Pleasant gentleman.  In no acute discomfort.    Very hard of hearing.    Overall, feels well.  ECOG 1.  Some generalized weakness.  Some trouble swallowing.    Has left parotid gland tumor; mildly to moderately painful.  No bleeding or ulceration.  No unusual headaches or focal neurological symptoms.       OP PACLITAXEL CARBOPLATIN (AUC 5) Q3W     09/08/2023:   -09/05/2023:  MRI soft tissue of the neck with and without contrast for restaging (comparison: CT neck 03/10/2023, MRI brain 06/29/2023): Similar size of the left parotid tail mass compared to 03/10/2023.  (2.1 x 2.8 x 2.3 cm)  -09/05/2023:  Restaging chest CT with contrast (comparison:  06/29/2023):   Multiple lung nodules as outlined above overall stable since prior examination (patchy area of nodularity right lung apex, stable; stable 4 mm nodule right upper lobe; spiculated mass right upper lobe 1.5 x 1.6 cm; scattered areas of punctate nodularity left upper lobe; spiculated nodule lingula 9 mm x 6 mm; low-density lesion left upper lobe; other scattered punctate nodules in the lungs bilaterally; spiculated area right lower lobe). Nonspecific area of inflammatory change and mesenteric stranding in the left upper quadrant.  The area is incompletely evaluated on this  examination and CT scan abdomen pelvis may be beneficial for correlation. Cholelithiasis  Presents for a follow-up visit.  In no acute discomfort.  Says that he has not had anymore falls.  He severely getting impaired.  Apparently, home health nurse visits him.  He says that the left parotid tumor appears to be enlarging.  He brings up a very good idea of palliative radiation therapy left parotid gland; we will refer him to Radiation Oncology.  We reinforced that we are not going to resume his chemotherapy anymore because it will put him more at risk then any anticipated benefit.  He has had repeated falls the past, resulting in subdural hematoma.  We do not wish to worsen the situation with more falls which may cause catastrophic intracranial bleeding.      Medications:  Current Outpatient Medications on File Prior to Visit   Medication Sig Dispense Refill    dexAMETHasone (DECADRON) 4 MG Tab Take 2 tablets(8 mg) once daily on days 2,3, and 4 of chemotherapy cycle. 24 tablet 1    ondansetron (ZOFRAN) 8 MG tablet Take 1 tablet (8 mg total) by mouth every 8 (eight) hours as needed for Nausea (Nausea). 30 tablet 2    tamsulosin (FLOMAX) 0.4 mg Cap Take 1 capsule (0.4 mg total) by mouth every evening. 30 capsule 2    trimethoprim (TRIMPEX) 100 mg Tab Take 100 mg by mouth once daily.       Current Facility-Administered Medications on File Prior to Visit   Medication Dose Route Frequency Provider Last Rate Last Admin    sodium chloride 0.9% flush 10 mL  10 mL Intravenous PRN Compa Garcia MD           Review of Systems:   All systems reviewed and found to be negative except for the symptoms detailed above    Physical Examination:   VITAL SIGNS:   Vitals:    09/08/23 0834   BP: 136/80   Pulse: 69   Resp: 20   Temp: 97.8 °F (36.6 °C)     GENERAL:  In no apparent distress.    HEAD:  No signs of head trauma.  EYES:  Pupils are equal.  Extraocular motions intact.    EARS:  Hearing grossly intact.  MOUTH:  Oropharynx is  "normal.   NECK:  No adenopathy, no JVD.     CHEST:  Chest with clear breath sounds bilaterally.  No wheezes, rales, rhonchi.    CARDIAC:  Regular rate and rhythm.  S1 and S2, without murmurs, gallops, rubs.  VASCULAR:  No Edema.  Peripheral pulses normal and equal in all extremities.  ABDOMEN:  Soft, without detectable tenderness.  No sign of distention.  No   rebound or guarding, and no masses palpated.   Bowel Sounds normal.  MUSCULOSKELETAL:  Good range of motion of all major joints. Extremities withouta clubbing, cyanosis or edema.    NEUROLOGIC EXAM:  Alert and oriented x 3.  No focal sensory or strength deficits.   Speech normal.  Follows commands.  PSYCHIATRIC:  Mood normal.  06/12/2023: In no acute discomfort.  Left parotid gland tumor is noted, mildly tender, approximately 4 cm x 4 cm, with a dark spot at the inferior aspect, without superficial ulceration or bleeding.  08/16/2023: Ambulates with the help of a walker.  No grossly apparent focal deficit.  Extremely hard of hearing.  Left parotid mass has shrunk considerably.    No results for input(s): "CBC" in the last 72 hours.   No results for input(s): "CMP" in the last 72 hours.     Assessment:  Problem List Items Addressed This Visit          Neuro    SDH (subdural hematoma)       Pulmonary    Lung nodule, multiple       Oncology    Carcinomatous metastasis in skin    Squamous cell carcinoma of skin of cheek    Adenocarcinoma of parotid gland - Primary    Secondary adenocarcinoma of right lung       Endocrine    Thyroid nodule       Orthopedic    Falls     Metastatic poorly-differentiated carcinoma/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:   -presentation:  Left neck mass for a month  -on physical exam by ENT 05/31/2023:  3.5 cm firm mass left tail of parotid, fixed to the adjacent sternocleidomastoid muscle/masseter muscle as well as overlying skin; 3 new subcentimeter nodules fixed to the skin in the left post auricular skin; " hypopigmented skin lesion inferior to the mass as well  -FNA left superior level 5 mass 01/19/2023:  Poorly-differentiated carcinoma, consistent with a poorly-differentiated carcinoma in which a high-grade primary parotid malignancy or metastatic carcinoma is in the differential   -left parotid gland mass 1.9 x 2.0 x 2.4 cm on noncontrast CT neck 01/24/2023  -hypermetabolic left parotid nodule 1.9 x 2 cm on FDG PET-CT 02/16/2023  -2.5 cm left parotid tail mass on CT neck with contrast 03/10/2023, abutting the skin  -no cervical lymphadenopathy  -------------  -small nodules left upper lung lobe, up to 2 mm, on noncontrast CT neck 01/24/2023  -bilateral hypermetabolic upper lobe lung nodules on FDG PET-CT 02/16/2023, largest nodule right upper lung lobe 1.9 cm  Bilateral lung nodules, similar, on contrast-enhanced chest CT 03/10/2023 (largest nodule right upper lung lobe, 1.9 cm)  -right upper lung lobe lung lobe nodule CT-guided needle core biopsy 05/25/2023:  Adenocarcinoma (a metastases from poorly-differentiated carcinoma of parotid gland, is favored)  -----------  -by virtue of skin involvement, cT4a  -cN0  -lung metastases, therefore, M1  >> cT4a cN0 M1, stage IVC  -palliative chemotherapy with carboplatin/Taxol started 06/28/2023   -baseline chest CT with contrast and brain MRI with contrast 06/29/2023   -severe neutropenia, ANC 0.37 K, on 07/06/2023, 8 days post cycle 1 of chemotherapy; treated and resolved uneventfully  -07/18/2023:  Left parotid gland tumor is already shrinking post cycle 1 of chemotherapy; did not experience any side effects with chemotherapy; ECOG 0-1  08/16/2023:   -cycle 2 of carboplatin/Taxol on 07/18/2023 (Neulasta support from cycle 2 onwards)  -08/08/2023:  Patient had arrived for scheduled appointment today appearing disheveled and very weak. Patient transported to emergency department for medical evaluation.  Patient was seen by home health nurse on Monday 8/7/23 with blood  pressure of 70/48, pulse 112, temp 99.1; patient reporting he has had falls in the home. Reports that the nurse directed patient to go to ER to be evaluated.  Home health nurse reports patient does have a working AC but keeps it warm in the home. Patient does have appropriate running water. Patient has had home health SW visits to evaluate safety and patient needs.  PADMINI Blair addressed with home health the issue of patient's self-neglect and if a report to [employer] is needed.  Per , patient is in his right mind but there are concerns about his falls in the home. PADMINI Blair contacted CHERRIE Prakash in outpatient CM to inform of above and discuss options. CHERRIE Prakash feels that [employer] may need to be called to evaluate patient's situation.  ER  was notified.  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 Some increase interstitial markings in the left upper lobe also unchanged as compared with the previous exam. Otherwise no active pulmonary disease  -08/08/2023: CT head without contrast (dizziness, persistent/recurrent) (comparison:  MRI 06/29/2023): Hypoattenuating subdural collection over the right frontal lobe most suggestive of chronic subdural hematoma (measures 9 mm; was not evident on prior MRI; mild mass effect on the underlying brain; no midline shift; no convincing acute hemorrhage).  No significant mass effect.  This is new compared to June 2023.  -08/09/2020:  TTE: LVEF 60-65%  -restaging MRI neck 09/05/2023, restaging CT chest with  contrast 09/05/2023:  Unchanged left parotid tail mass and unchanged multiple bilateral lung nodules  >>>  09/08/2023: He says that left parotid masses enlarging; denies significant pain; great appetite  Will refer him to Radiation Oncology for palliative radiotherapy to left parotid mass  Have already discontinued chemotherapy  No follow-up with us   He should follow-up with PCP/ENT.      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 Esquivel), with Mohs resection      Benign thyroid nodule:  -suspected hypermetabolic right thyroid nodule on FDG PET-CT 02/16/2023  -single solid 14 x 9 x 11 mm right thyroid nodule, TI-RADS 4, on thyroid ultrasound 03/10/2023  -FNA right thyroid nodule 03/23/2023: Benign thyroid nodule         Plan:  Refer to Radiation Oncology for consideration of palliative radiotherapy to left parotid mass.    No follow-up with us.  Follow-up with ENT and PCP.  --------------------------      Has lung metastases, proven with biopsy  Needs palliative systemic therapy  Expectant management for slow-growing disease, is also an option  Palliative chemotherapy with carboplatin/Taxol every 3 weeks, started 06/28/2023  Severe neutropenia, ANC 0.37K, 8 days post chemotherapy  -07/18/2023:  Left parotid gland tumor is already shrinking post cycle 1 of chemotherapy; did not experience any side effects with chemotherapy; ECOG 0-1  -cycle 2 of carboplatin/Taxol on 07/18/2023 (Neulasta support from cycle 2 onwards)  -08/08/2023:  Patient disheveled and very weak.  On 08/07/2023, found hypotensive by home health nurse.  Poor social situation.  Self neglect.   Falls at home.  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  No significant mass effect.  This is new compared to June 2023.  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  All questions answered.  He does not want services of hospice at this time.  He appears to be amenable to continued services of home health and maybe, palliative services.  He wishes to have restaging scans done which I have ordered.    Will follow-up in 2 weeks to go over the scans.    At this time, he is amenable to discontinue chemotherapy because of concerns with his ability to handle side effects of chemotherapy as well as  to prevent weakness, hypotension, and further falls which may cause catastrophic intracranial bleeding.    09/08/2023: He says that left parotid masses enlarging; denies significant pain; great appetite  Will refer him to Radiation Oncology for palliative radiotherapy to left parotid mass  Have already discontinued chemotherapy  No follow-up with us   Follow-up with PCP/ENT.    Above discussed with him.  All questions answered.  Our nurse navigator was present.  Gave him copies of reports of scans.  He understands and agrees this plan.  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)  -carboplatin/Taxol  -carboplatin/gemcitabine  >>>  We decided to treat him with carboplatin Taxol to start with    Carboplatin/Taxol every 3 weeks, options:  -paclitaxel 175 mg per m2 IV over 3 hours on day 1, followed by  -carboplatin AUC 5.5 IV over 30 minutes on day 1  Repeat cycle every 21 days until disease progression or unacceptable toxicity  Or,   -paclitaxel 200 mg per m2 IV over 3 hours on day 1, followed by  -carboplatin AUC 6 IV over 30 minutes on day 1   Repeat cycle every 21 days until disease progression or unacceptable toxicity    Useful in certain circumstances:   -androgen receptor therapy for AR pos tumors: Leuprolide; bicalutamide  -NTRK therapy for NTRK gene fusion pos tumors: Larotrectinib; entrectinib  -HER2 targeted therapy for HER2 pos tumors: Trastuzumab; ado  trastuzumab emtansine; trastuzumab/pertuzumab; docetaxel/trastuzumab; Fam-trastuzumab deruxtecan (category 2B)  -axitinib, sorafenib: Category 2B  -lenvatinib (category 2B) for adenoid cystic carcinoma  -pembrolizumab (for microsatellite instability-high, i.e., MSI-H, MMR deficient, i.e., dMMR, TMB-H (=/> 10 mut/Mb)   -dabrafenib/trametinib for BRAF V600 E pos tumors  -Selpercatinib for RET gene fusion pos tumors  >>>  Molecular testing (cancer gene mutation panel by NGS) on right lung biopsy dated 05/25/2023:  Negative for ALK, RET, NTRK1/2/3, RNA gene fusions;   nondiagnostic study (unable to amplified DNA);   PD-L1 expression negative based on both TPS (< 1%) and CPS (<1);   negative androgen receptor by IHC  >>>  Nondiagnostic study on tissue (lung metastasis)  Will order liquid testing    Above discussed at length with the patient.  All questions answered.    He understands and agrees with this plan.    Follow-up:  No follow-ups on file.  a

## 2023-09-13 ENCOUNTER — OFFICE VISIT (OUTPATIENT)
Dept: OTOLARYNGOLOGY | Facility: CLINIC | Age: 88
End: 2023-09-13
Payer: MEDICARE

## 2023-09-13 VITALS
RESPIRATION RATE: 18 BRPM | SYSTOLIC BLOOD PRESSURE: 109 MMHG | DIASTOLIC BLOOD PRESSURE: 67 MMHG | OXYGEN SATURATION: 97 % | WEIGHT: 159 LBS | BODY MASS INDEX: 22.26 KG/M2 | TEMPERATURE: 98 F | HEART RATE: 85 BPM | HEIGHT: 71 IN

## 2023-09-13 DIAGNOSIS — R22.1 NECK MASS: Primary | ICD-10-CM

## 2023-09-13 DIAGNOSIS — C07 CARCINOMA OF PAROTID GLAND: ICD-10-CM

## 2023-09-13 DIAGNOSIS — J31.0 RHINITIS, UNSPECIFIED TYPE: ICD-10-CM

## 2023-09-13 PROCEDURE — 99213 OFFICE O/P EST LOW 20 MIN: CPT | Mod: PBBFAC | Performed by: OTOLARYNGOLOGY

## 2023-09-13 RX ORDER — IPRATROPIUM BROMIDE 21 UG/1
2 SPRAY, METERED NASAL 3 TIMES DAILY
Qty: 30 ML | Refills: 2 | Status: SHIPPED | OUTPATIENT
Start: 2023-09-13

## 2023-10-03 DIAGNOSIS — S06.5XAA SDH (SUBDURAL HEMATOMA): Primary | ICD-10-CM

## 2023-10-03 DIAGNOSIS — I62.00 NONTRAUMATIC SUBDURAL HEMORRHAGE, UNSPECIFIED: ICD-10-CM

## 2023-10-09 ENCOUNTER — TELEPHONE (OUTPATIENT)
Dept: NEUROSURGERY | Facility: CLINIC | Age: 88
End: 2023-10-09
Payer: MEDICARE

## 2023-10-09 NOTE — TELEPHONE ENCOUNTER
I tried to call patient again to schedule his hospital F/U, no answer so I LMOM. I will mail the patient one more letter.

## 2023-10-09 NOTE — LETTER
October 9, 2023    Erik FULTON [id number]             [facility]  [address], [address]  [place] [address]  Phone: [phone] Dear Mr. Lundy:    We have been trying to call you to schedule your hospital follow up with no success. Please give our office a call at your earliest convenience so we can get this scheduled. Thank you!     If you have any questions or concerns, please don't hesitate to call.    Sincerely,        Emma Bennett MD

## 2023-10-18 ENCOUNTER — OFFICE VISIT (OUTPATIENT)
Dept: OTOLARYNGOLOGY | Facility: CLINIC | Age: 88
End: 2023-10-18
Payer: MEDICARE

## 2023-10-18 VITALS
WEIGHT: 159.19 LBS | TEMPERATURE: 98 F | SYSTOLIC BLOOD PRESSURE: 122 MMHG | DIASTOLIC BLOOD PRESSURE: 76 MMHG | RESPIRATION RATE: 20 BRPM | OXYGEN SATURATION: 98 % | BODY MASS INDEX: 22.29 KG/M2 | HEART RATE: 70 BPM | HEIGHT: 71 IN

## 2023-10-18 DIAGNOSIS — C07 ADENOCARCINOMA OF PAROTID GLAND: ICD-10-CM

## 2023-10-18 DIAGNOSIS — C07 CARCINOMA OF PAROTID GLAND: Primary | ICD-10-CM

## 2023-10-18 PROCEDURE — 99214 OFFICE O/P EST MOD 30 MIN: CPT | Mod: PBBFAC | Performed by: STUDENT IN AN ORGANIZED HEALTH CARE EDUCATION/TRAINING PROGRAM
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
## 2023-11-15 ENCOUNTER — OFFICE VISIT (OUTPATIENT)
Dept: OTOLARYNGOLOGY | Facility: CLINIC | Age: 88
End: 2023-11-15
Payer: MEDICARE

## 2023-11-15 VITALS
HEIGHT: 71 IN | RESPIRATION RATE: 20 BRPM | DIASTOLIC BLOOD PRESSURE: 64 MMHG | OXYGEN SATURATION: 99 % | HEART RATE: 74 BPM | TEMPERATURE: 97 F | WEIGHT: 158.19 LBS | SYSTOLIC BLOOD PRESSURE: 102 MMHG | BODY MASS INDEX: 22.15 KG/M2

## 2023-11-15 DIAGNOSIS — C07 ADENOCARCINOMA OF PAROTID GLAND: Primary | ICD-10-CM

## 2023-11-15 PROCEDURE — 99213 OFFICE O/P EST LOW 20 MIN: CPT | Mod: PBBFAC | Performed by: STUDENT IN AN ORGANIZED HEALTH CARE EDUCATION/TRAINING PROGRAM
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
## 2024-02-21 ENCOUNTER — HOSPITAL ENCOUNTER (INPATIENT)
Facility: HOSPITAL | Age: 89
LOS: 3 days | Discharge: HOME HEALTH | DRG: 194 | End: 2024-02-24
Attending: EMERGENCY MEDICINE | Admitting: STUDENT IN AN ORGANIZED HEALTH CARE EDUCATION/TRAINING PROGRAM
Payer: MEDICARE

## 2024-02-21 ENCOUNTER — OFFICE VISIT (OUTPATIENT)
Dept: OTOLARYNGOLOGY | Facility: CLINIC | Age: 89
DRG: 194 | End: 2024-02-21
Payer: MEDICARE

## 2024-02-21 ENCOUNTER — OFFICE VISIT (OUTPATIENT)
Dept: URGENT CARE | Facility: CLINIC | Age: 89
DRG: 194 | End: 2024-02-21
Payer: MEDICARE

## 2024-02-21 VITALS
SYSTOLIC BLOOD PRESSURE: 71 MMHG | HEIGHT: 71 IN | WEIGHT: 156 LBS | RESPIRATION RATE: 22 BRPM | BODY MASS INDEX: 21.84 KG/M2 | HEART RATE: 76 BPM | OXYGEN SATURATION: 94 % | DIASTOLIC BLOOD PRESSURE: 42 MMHG | TEMPERATURE: 99 F

## 2024-02-21 VITALS
WEIGHT: 158 LBS | HEIGHT: 71 IN | DIASTOLIC BLOOD PRESSURE: 66 MMHG | RESPIRATION RATE: 22 BRPM | BODY MASS INDEX: 22.12 KG/M2 | SYSTOLIC BLOOD PRESSURE: 112 MMHG | HEART RATE: 76 BPM

## 2024-02-21 DIAGNOSIS — C78.01 SECONDARY ADENOCARCINOMA OF RIGHT LUNG: ICD-10-CM

## 2024-02-21 DIAGNOSIS — J18.9 PNEUMONIA DUE TO INFECTIOUS ORGANISM, UNSPECIFIED LATERALITY, UNSPECIFIED PART OF LUNG: ICD-10-CM

## 2024-02-21 DIAGNOSIS — R50.9 FEVER, UNSPECIFIED FEVER CAUSE: ICD-10-CM

## 2024-02-21 DIAGNOSIS — K40.90 RIGHT INGUINAL HERNIA: ICD-10-CM

## 2024-02-21 DIAGNOSIS — R05.9 COUGH: ICD-10-CM

## 2024-02-21 DIAGNOSIS — C78.00 SECONDARY CARCINOMA OF LUNG, UNSPECIFIED LATERALITY: ICD-10-CM

## 2024-02-21 DIAGNOSIS — C07 PAROTID GLAND ADENOCARCINOMA: ICD-10-CM

## 2024-02-21 DIAGNOSIS — C44.329 SQUAMOUS CELL CARCINOMA OF SKIN OF CHEEK: ICD-10-CM

## 2024-02-21 DIAGNOSIS — C07 CARCINOMA OF PAROTID GLAND: ICD-10-CM

## 2024-02-21 DIAGNOSIS — S06.5XAA SDH (SUBDURAL HEMATOMA): ICD-10-CM

## 2024-02-21 DIAGNOSIS — R03.1 LOW BLOOD PRESSURE READING: Primary | ICD-10-CM

## 2024-02-21 DIAGNOSIS — N18.9 CHRONIC RENAL IMPAIRMENT, UNSPECIFIED CKD STAGE: ICD-10-CM

## 2024-02-21 DIAGNOSIS — R53.1 WEAK: ICD-10-CM

## 2024-02-21 DIAGNOSIS — C07 ADENOCARCINOMA OF PAROTID GLAND: Primary | ICD-10-CM

## 2024-02-21 DIAGNOSIS — T66.XXXA RADIATION ADVERSE EFFECT: ICD-10-CM

## 2024-02-21 DIAGNOSIS — I95.9 HYPOTENSION, UNSPECIFIED HYPOTENSION TYPE: Primary | ICD-10-CM

## 2024-02-21 DIAGNOSIS — Z92.3 HISTORY OF HEAD AND NECK RADIATION: ICD-10-CM

## 2024-02-21 LAB
APPEARANCE UR: CLEAR
BACTERIA #/AREA URNS AUTO: ABNORMAL /HPF
BILIRUB UR QL STRIP.AUTO: NEGATIVE
BNP BLD-MCNC: 121.9 PG/ML
COLOR UR AUTO: ABNORMAL
FLUAV AG UPPER RESP QL IA.RAPID: NOT DETECTED
FLUBV AG UPPER RESP QL IA.RAPID: NOT DETECTED
GLUCOSE UR QL STRIP.AUTO: NORMAL
HOLD SPECIMEN: NORMAL
HYALINE CASTS #/AREA URNS LPF: ABNORMAL /LPF
KETONES UR QL STRIP.AUTO: NEGATIVE
LACTATE SERPL-SCNC: 1.4 MMOL/L (ref 0.5–2.2)
LEUKOCYTE ESTERASE UR QL STRIP.AUTO: NEGATIVE
MUCOUS THREADS URNS QL MICRO: ABNORMAL /LPF
NITRITE UR QL STRIP.AUTO: NEGATIVE
PH UR STRIP.AUTO: 6.5 [PH]
PROT UR QL STRIP.AUTO: ABNORMAL
RBC #/AREA URNS AUTO: ABNORMAL /HPF
RBC UR QL AUTO: NEGATIVE
SARS-COV-2 RNA RESP QL NAA+PROBE: NOT DETECTED
SP GR UR STRIP.AUTO: 1.01 (ref 1–1.03)
SQUAMOUS #/AREA URNS LPF: ABNORMAL /HPF
STREP A PCR (OHS): NOT DETECTED
TROPONIN I SERPL-MCNC: <0.01 NG/ML (ref 0–0.04)
UROBILINOGEN UR STRIP-ACNC: NORMAL
WBC #/AREA URNS AUTO: ABNORMAL /HPF

## 2024-02-21 PROCEDURE — 63600175 PHARM REV CODE 636 W HCPCS: Performed by: EMERGENCY MEDICINE

## 2024-02-21 PROCEDURE — 87040 BLOOD CULTURE FOR BACTERIA: CPT | Performed by: EMERGENCY MEDICINE

## 2024-02-21 PROCEDURE — 83880 ASSAY OF NATRIURETIC PEPTIDE: CPT | Performed by: NURSE PRACTITIONER

## 2024-02-21 PROCEDURE — 21400001 HC TELEMETRY ROOM

## 2024-02-21 PROCEDURE — 81001 URINALYSIS AUTO W/SCOPE: CPT | Performed by: NURSE PRACTITIONER

## 2024-02-21 PROCEDURE — 25000003 PHARM REV CODE 250

## 2024-02-21 PROCEDURE — 87798 DETECT AGENT NOS DNA AMP: CPT

## 2024-02-21 PROCEDURE — 63600175 PHARM REV CODE 636 W HCPCS

## 2024-02-21 PROCEDURE — 96360 HYDRATION IV INFUSION INIT: CPT

## 2024-02-21 PROCEDURE — 84484 ASSAY OF TROPONIN QUANT: CPT | Performed by: NURSE PRACTITIONER

## 2024-02-21 PROCEDURE — 99215 OFFICE O/P EST HI 40 MIN: CPT | Mod: S$PBB,,,

## 2024-02-21 PROCEDURE — 31575 DIAGNOSTIC LARYNGOSCOPY: CPT | Mod: PBBFAC | Performed by: STUDENT IN AN ORGANIZED HEALTH CARE EDUCATION/TRAINING PROGRAM

## 2024-02-21 PROCEDURE — 83605 ASSAY OF LACTIC ACID: CPT | Performed by: EMERGENCY MEDICINE

## 2024-02-21 PROCEDURE — 93005 ELECTROCARDIOGRAM TRACING: CPT

## 2024-02-21 PROCEDURE — 25000003 PHARM REV CODE 250: Performed by: STUDENT IN AN ORGANIZED HEALTH CARE EDUCATION/TRAINING PROGRAM

## 2024-02-21 PROCEDURE — 87070 CULTURE OTHR SPECIMN AEROBIC: CPT

## 2024-02-21 PROCEDURE — 99285 EMERGENCY DEPT VISIT HI MDM: CPT | Mod: 25,27

## 2024-02-21 PROCEDURE — 87651 STREP A DNA AMP PROBE: CPT | Performed by: EMERGENCY MEDICINE

## 2024-02-21 PROCEDURE — 25000003 PHARM REV CODE 250: Performed by: EMERGENCY MEDICINE

## 2024-02-21 PROCEDURE — 0240U COVID/FLU A&B PCR: CPT | Performed by: NURSE PRACTITIONER

## 2024-02-21 PROCEDURE — 99213 OFFICE O/P EST LOW 20 MIN: CPT | Mod: PBBFAC,25

## 2024-02-21 PROCEDURE — 63600175 PHARM REV CODE 636 W HCPCS: Performed by: STUDENT IN AN ORGANIZED HEALTH CARE EDUCATION/TRAINING PROGRAM

## 2024-02-21 PROCEDURE — 99214 OFFICE O/P EST MOD 30 MIN: CPT | Mod: PBBFAC,27,25

## 2024-02-21 RX ORDER — TALC
6 POWDER (GRAM) TOPICAL NIGHTLY PRN
Status: DISCONTINUED | OUTPATIENT
Start: 2024-02-21 | End: 2024-02-24 | Stop reason: HOSPADM

## 2024-02-21 RX ORDER — SODIUM CHLORIDE, SODIUM LACTATE, POTASSIUM CHLORIDE, CALCIUM CHLORIDE 600; 310; 30; 20 MG/100ML; MG/100ML; MG/100ML; MG/100ML
1000 INJECTION, SOLUTION INTRAVENOUS
Status: COMPLETED | OUTPATIENT
Start: 2024-02-21 | End: 2024-02-21

## 2024-02-21 RX ORDER — TRIMETHOPRIM 100 MG/1
100 TABLET ORAL DAILY
Status: DISCONTINUED | OUTPATIENT
Start: 2024-02-22 | End: 2024-02-24 | Stop reason: HOSPADM

## 2024-02-21 RX ORDER — ACETAMINOPHEN 325 MG/1
650 TABLET ORAL
Status: COMPLETED | OUTPATIENT
Start: 2024-02-21 | End: 2024-02-21

## 2024-02-21 RX ORDER — SODIUM CHLORIDE 0.9 % (FLUSH) 0.9 %
10 SYRINGE (ML) INJECTION
Status: DISCONTINUED | OUTPATIENT
Start: 2024-02-21 | End: 2024-02-24 | Stop reason: HOSPADM

## 2024-02-21 RX ORDER — SODIUM CHLORIDE, SODIUM LACTATE, POTASSIUM CHLORIDE, CALCIUM CHLORIDE 600; 310; 30; 20 MG/100ML; MG/100ML; MG/100ML; MG/100ML
INJECTION, SOLUTION INTRAVENOUS CONTINUOUS
Status: DISCONTINUED | OUTPATIENT
Start: 2024-02-21 | End: 2024-02-23

## 2024-02-21 RX ORDER — TAMSULOSIN HYDROCHLORIDE 0.4 MG/1
1 CAPSULE ORAL NIGHTLY
Status: DISCONTINUED | OUTPATIENT
Start: 2024-02-21 | End: 2024-02-24 | Stop reason: HOSPADM

## 2024-02-21 RX ADMIN — ACETAMINOPHEN 650 MG: 325 TABLET, FILM COATED ORAL at 02:02

## 2024-02-21 RX ADMIN — SODIUM CHLORIDE, POTASSIUM CHLORIDE, SODIUM LACTATE AND CALCIUM CHLORIDE 1000 ML: 600; 310; 30; 20 INJECTION, SOLUTION INTRAVENOUS at 02:02

## 2024-02-21 RX ADMIN — SODIUM CHLORIDE, POTASSIUM CHLORIDE, SODIUM LACTATE AND CALCIUM CHLORIDE 1000 ML: 600; 310; 30; 20 INJECTION, SOLUTION INTRAVENOUS at 09:02

## 2024-02-21 RX ADMIN — TAMSULOSIN HYDROCHLORIDE 0.4 MG: 0.4 CAPSULE ORAL at 09:02

## 2024-02-21 RX ADMIN — CEFTRIAXONE SODIUM 1 G: 1 INJECTION, POWDER, FOR SOLUTION INTRAMUSCULAR; INTRAVENOUS at 09:02

## 2024-02-21 RX ADMIN — VANCOMYCIN HYDROCHLORIDE 1750 MG: 500 INJECTION, POWDER, LYOPHILIZED, FOR SOLUTION INTRAVENOUS at 10:02

## 2024-02-21 RX ADMIN — SODIUM CHLORIDE, POTASSIUM CHLORIDE, SODIUM LACTATE AND CALCIUM CHLORIDE 400 ML: 600; 310; 30; 20 INJECTION, SOLUTION INTRAVENOUS at 02:02

## 2024-02-21 RX ADMIN — SODIUM CHLORIDE, POTASSIUM CHLORIDE, SODIUM LACTATE AND CALCIUM CHLORIDE: 600; 310; 30; 20 INJECTION, SOLUTION INTRAVENOUS at 10:02
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
## 2024-02-21 NOTE — PROGRESS NOTES
"Subjective:       Patient ID: Erik Lundy is a 88 y.o. male.    Vitals:  height is 5' 11" (1.803 m) and weight is 70.8 kg (156 lb). His oral temperature is 99.2 °F (37.3 °C). His blood pressure is 71/42 (abnormal) and his pulse is 76. His respiration is 22 (abnormal) and oxygen saturation is 94% (abnormal).     Chief Complaint: URI (Patient states he was sent here from ENT. Patient presents with chills, sore throat and body aches. Patient's gait is unsteady and balance is off, blood pressure is low and has a low grade temp. Patient stated he has been like this since taking a laxative last night. Provider notified.)    88-year-old  male with history of lung metastasis currently participates in radiation treatment.  Reports symptoms began less than 1 week ago after taking laxative.          Constitution: Positive for chills and fatigue.   HENT:  Positive for sore throat.    Respiratory:  Negative for cough and shortness of breath.        Objective:      Physical Exam   Constitutional: He is oriented to person, place, and time. He is cooperative. He does not appear ill.      Comments:Mild somnolence  +skin tenting      awake  HENT:   Head: Normocephalic and atraumatic.   Mouth/Throat: Mucous membranes are pale.   Eyes: Conjunctivae and lids are normal.   Neck: Neck supple.   Abdominal: Normal appearance.   Musculoskeletal:      Left hand: Comments: +clubbing    Neurological: He is alert and oriented to person, place, and time. Gait normal. GCS eye subscore is 4. GCS verbal subscore is 5. GCS motor subscore is 6.   Skin: Skin is warm, dry and intact. Capillary refill takes 2 to 3 seconds.   Psychiatric: His speech is normal and behavior is normal.   Nursing note and vitals reviewed.        Assessment:       1. Low blood pressure reading          Plan:     We will refer to ER for further workup.    Low blood pressure reading  -     Refer to Emergency Dept.             Medical Decision Making:   Differential " Diagnosis:   Dehydration, COVID, FLU  Urgent Care Management:  Educated patient he needs a more extensive workup than offered in urgent care setting.  We will refer patient to ER for further workup.  Patient is hypotensive in clinic.  Staff will transport patient to [facility] ER.

## 2024-02-21 NOTE — FIRST PROVIDER EVALUATION
Medical screening examination initiated.  I have conducted a focused provider triage encounter, findings are as follows:    Brief history of present illness:  Pt is an 88 y.o. male who presents to the [facility] ED for evaluation after found to be hypotensive while checking in to the [facility]. Reports chills, weakness. Hx of parotid cancer.    There were no vitals filed for this visit.    Pertinent physical exam:      Brief workup plan:  Diagnostic evaluation    Preliminary workup initiated; this workup will be continued and followed by the physician or advanced practice provider that is assigned to the patient when roomed.

## 2024-02-21 NOTE — PROGRESS NOTES
The scope used for the exam was:  Flexible scope ENF-P4  Serial Number:  1)    [id number]    [x]   2)    [id number]    []   3)    [id number]    []   4)    [id number]    []   5)    [id number]    []   6)    [id number]    []       The scope used for the exam was:  Rigid scope   Serial Number:  1)   [id number]    []   2)   6282    []   3)   7330    []   4)    3384   []   5)    0824   []   6)    5554   []     7)   7425    []   8)   2240    []   9)   1109    []

## 2024-02-21 NOTE — ED PROVIDER NOTES
Encounter Date: 2/21/2024       History     Chief Complaint   Patient presents with    Chills    Fever    Weakness    Sore Throat     C/o chills,  fever, sore throat, weakness x 2 days. Sent from urgent care due to hypotension. States was sent to urgent care from ent clinic due to not feeling well.      Referred to the ER from urgent care for low blood pressure.  He presented on his own reporting chills, fever, sore throat, weakness for about 2 days.  He felt somewhat constipated and took a laxative but did not report any diarrhea, nausea, vomiting, or urinary complaints.  No chest pain or dyspnea.  Found to have a borderline temperature and low blood pressure on at least 2 occasions as documented.  He does not relate syncope or presyncope, no orthostatic symptoms, no other specific complaints.  Repeat temperature is rising and blood pressure normalized on arrival to the ER, normal room air oxygen saturation.  Preliminary data show chronic renal insufficiency that appears approximately stable and relatively unremarkable CBC and chemistries otherwise.  Additional evaluation and treatment underway.  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  Was seen at the ENT clinic earlier today as well with hypotension.    The history is provided by the patient. No  was used.     Review of patient's allergies indicates:  No Known Allergies  Past Medical History:   Diagnosis Date    Skin cancer      Past Surgical History:   Procedure Laterality Date    BIOPSY OF THYROID Right      History reviewed. No pertinent family history.  Social History     Tobacco Use    Smoking status: Never     Passive exposure: Never    Smokeless tobacco: Never   Substance Use Topics    Alcohol use: Yes     Alcohol/week: 3.0 standard drinks of alcohol     Types: 3 Cans of beer per week     Comment: 3 cans of beer on the weekend    Drug use:  Never     Review of Systems   Constitutional:  Positive for chills and fever.   HENT:  Positive for sore throat. Negative for congestion, facial swelling, nosebleeds and sinus pressure.    Eyes:  Negative for pain and redness.   Respiratory:  Negative for chest tightness, shortness of breath and wheezing.    Cardiovascular:  Negative for chest pain, palpitations and leg swelling.   Gastrointestinal:  Negative for abdominal distention, abdominal pain, diarrhea, nausea and vomiting.   Endocrine: Negative for cold intolerance, polydipsia and polyphagia.   Genitourinary:  Negative for difficulty urinating, dysuria, frequency and hematuria.   Musculoskeletal:  Negative for arthralgias, back pain, myalgias and neck pain.   Skin:  Negative for color change and rash.   Neurological:  Positive for weakness. Negative for dizziness, numbness and headaches.   Hematological:  Negative for adenopathy. Does not bruise/bleed easily.   Psychiatric/Behavioral:  Negative for agitation and behavioral problems.    All other systems reviewed and are negative.      Physical Exam     Initial Vitals [02/21/24 1326]   BP Pulse Resp Temp SpO2   106/61 100 (!) 24 (!) 100.8 °F (38.2 °C) 95 %      MAP       --         Physical Exam    Nursing note and vitals reviewed.  Constitutional: He appears well-developed and well-nourished. He is not diaphoretic. No distress.   Frail, elderly, mildly underweight, hard of hearing, low-grade temperature, initial borderline low blood pressure improving prior to intervention.  No physical distress.   HENT:   Head: Normocephalic and atraumatic.   Mouth/Throat: Oropharynx is clear and moist. No oropharyngeal exudate.   Eyes: Conjunctivae and EOM are normal. Pupils are equal, round, and reactive to light. Right eye exhibits no discharge. Left eye exhibits no discharge. No scleral icterus.   Neck: Neck supple. No thyromegaly present. No tracheal deviation present. No JVD present.   Normal range of  motion.  Cardiovascular:  Normal rate, regular rhythm and normal heart sounds.     Exam reveals no gallop and no friction rub.       No murmur heard.  Pulmonary/Chest: Breath sounds normal. No respiratory distress. He has no wheezes. He has no rhonchi. He has no rales. He exhibits no tenderness.   Abdominal: Abdomen is soft. Bowel sounds are normal. He exhibits no distension and no mass. There is no abdominal tenderness.   Large, soft, easily reducible right inguinal hernia, at chronic baseline per patient. There is no rebound and no guarding.   Musculoskeletal:         General: No tenderness or edema. Normal range of motion.      Cervical back: Normal range of motion and neck supple.     Lymphadenopathy:     He has no cervical adenopathy.   Neurological: He is alert and oriented to person, place, and time. He has normal strength. No cranial nerve deficit.   Skin: Skin is warm and dry. No rash noted. No erythema.   Psychiatric: He has a normal mood and affect. His behavior is normal. Judgment and thought content normal.         ED Course   Procedures  Labs Reviewed   B-TYPE NATRIURETIC PEPTIDE - Abnormal; Notable for the following components:       Result Value    Natriuretic Peptide 121.9 (*)     All other components within normal limits   URINALYSIS, REFLEX TO URINE CULTURE - Abnormal; Notable for the following components:    Protein, UA Trace (*)     Mucous, UA Trace (*)     All other components within normal limits   TROPONIN I - Normal   COVID/FLU A&B PCR - Normal    Narrative:     The Xpert Xpress SARS-CoV-2/FLU/RSV plus is a rapid, multiplexed real-time PCR test intended for the simultaneous qualitative detection and differentiation of SARS-CoV-2, Influenza A, Influenza B, and respiratory syncytial virus (RSV) viral RNA in either nasopharyngeal swab or nasal swab specimens.         STREP GROUP A BY PCR - Normal    Narrative:     The Xpert Xpress Strep A test is a rapid, qualitative in vitro diagnostic test  for the detection of Streptococcus pyogenes (Group A ß-hemolytic Streptococcus, Strep A) in throat swab specimens from patients with signs and symptoms of pharyngitis.     LACTIC ACID, PLASMA - Normal   BLOOD CULTURE OLG   BLOOD CULTURE OLG   EXTRA TUBES    Narrative:     The following orders were created for panel order EXTRA TUBES.  Procedure                               Abnormality         Status                     ---------                               -----------         ------                     Light Blue Top Hold[[id number]]                             Final result               Lavender Top Hold[[id number]]                               Final result               Gold Top Hold[[id number]]                                   Final result                 Please view results for these tests on the individual orders.   LIGHT BLUE TOP HOLD   LAVENDER TOP HOLD   GOLD TOP HOLD     EKG Readings: (Independently Interpreted)   Initial Reading: No STEMI. Rhythm: Normal Sinus Rhythm. Ectopy: No Ectopy. Conduction: Normal. ST Segments: Normal ST Segments. T Waves: Normal. Clinical Impression: Normal Sinus Rhythm   Normal sinus rhythm at 84 beats per minute with occasional PACs, normal study.     ECG Results              EKG 12-lead (In process)        Collection Time Result Time QRS Duration OHS QTC Calculation    02/21/24 14:06:34 02/21/24 14:47:50 74 430                     In process by Interface, Lab In [facility] (02/21/24 14:47:59)                   Narrative:    Test Reason : R53.1,    Vent. Rate : 084 BPM     Atrial Rate : 084 BPM     P-R Int : 156 ms          QRS Dur : 074 ms      QT Int : 364 ms       P-R-T Axes : 080 014 079 degrees     QTc Int : 430 ms    Sinus rhythm with Premature atrial complexes  Otherwise normal ECG  When compared with ECG of 08-AUG-2023 09:04,  Premature atrial complexes are now Present  T wave amplitude has decreased in Lateral leads    Referred By: [email or web address]   SELF           Confirmed  By:                                   Imaging Results              X-Ray Chest 1 View (Final result)  Result time 02/21/24 14:28:38      Final result by Maged Sullivan MD (02/21/24 14:28:38)                   Impression:      Bilateral lungs scattered coarse reticulonodular changes suspected for pneumonitis or mild early infiltrates.      Electronically signed by: Maged Sullivan  Date:    02/21/2024  Time:    14:28               Narrative:    EXAMINATION:  XR CHEST 1 VIEW    CLINICAL HISTORY:  Cough, unspecified    TECHNIQUE:  One view    COMPARISON:  August 8, 2023.    FINDINGS:  Cardiopericardial silhouette is within normal limits.  There are scattered coarse reticulonodular changes of lungs which could represent pneumonitis or mild early mild infiltrates.  No focally dense consolidation or significant fluid within the pleural spaces.                                       Medications   acetaminophen tablet 650 mg (650 mg Oral Given 2/21/24 1410)   lactated ringers bolus 400 mL (0 mLs Intravenous Stopped 2/21/24 1438)   lactated ringers infusion (1,000 mLs Intravenous New Bag 2/21/24 1403)         3:45 PM Stable VS/ improved temp.    3:58 PM Consult internal medicine.  No specific focus of bacterial infection yet discovered.  Blood pressure stable, temperature improved, generally clinically unchanged otherwise.    Medical Decision Making  Problems Addressed:  Fever, unspecified fever cause: acute illness or injury    Amount and/or Complexity of Data Reviewed  Labs: ordered. Decision-making details documented in ED Course.  Radiology: ordered and independent interpretation performed. Decision-making details documented in ED Course.  ECG/medicine tests: ordered and independent interpretation performed. Decision-making details documented in ED Course.    Risk  OTC drugs.  Prescription drug management.  Decision regarding hospitalization.      Additional MDM:   Differential Diagnosis:   Volume depletion, sepsis, viral  syndrome, other causes of hypotension and fever.                                    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

## 2024-02-22 LAB
ALBUMIN SERPL-MCNC: 3.1 G/DL (ref 3.4–4.8)
ALBUMIN/GLOB SERPL: 1.1 RATIO (ref 1.1–2)
ALP SERPL-CCNC: 94 UNIT/L (ref 40–150)
ALT SERPL-CCNC: 10 UNIT/L (ref 0–55)
AST SERPL-CCNC: 17 UNIT/L (ref 5–34)
B PERT.PT PRMT NPH QL NAA+NON-PROBE: NOT DETECTED
BASOPHILS # BLD AUTO: 0.02 X10(3)/MCL
BASOPHILS NFR BLD AUTO: 0.2 %
BILIRUB SERPL-MCNC: 0.7 MG/DL
BUN SERPL-MCNC: 23.4 MG/DL (ref 8.4–25.7)
C PNEUM DNA NPH QL NAA+NON-PROBE: NOT DETECTED
CALCIUM SERPL-MCNC: 8.4 MG/DL (ref 8.8–10)
CHLORIDE SERPL-SCNC: 100 MMOL/L (ref 98–107)
CO2 SERPL-SCNC: 24 MMOL/L (ref 23–31)
CREAT SERPL-MCNC: 1.66 MG/DL (ref 0.73–1.18)
EOSINOPHIL # BLD AUTO: 0.04 X10(3)/MCL (ref 0–0.9)
EOSINOPHIL NFR BLD AUTO: 0.4 %
ERYTHROCYTE [DISTWIDTH] IN BLOOD BY AUTOMATED COUNT: 13 % (ref 11.5–17)
FERRITIN SERPL-MCNC: 380.04 NG/ML (ref 21.81–274.66)
GFR SERPLBLD CREATININE-BSD FMLA CKD-EPI: 39 MLS/MIN/1.73/M2
GLOBULIN SER-MCNC: 2.7 GM/DL (ref 2.4–3.5)
GLUCOSE SERPL-MCNC: 126 MG/DL (ref 82–115)
HADV DNA NPH QL NAA+NON-PROBE: NOT DETECTED
HCOV 229E RNA NPH QL NAA+NON-PROBE: NOT DETECTED
HCOV HKU1 RNA NPH QL NAA+NON-PROBE: NOT DETECTED
HCOV NL63 RNA NPH QL NAA+NON-PROBE: NOT DETECTED
HCOV OC43 RNA NPH QL NAA+NON-PROBE: NOT DETECTED
HCT VFR BLD AUTO: 31.5 % (ref 42–52)
HGB BLD-MCNC: 10.6 G/DL (ref 14–18)
HMPV RNA NPH QL NAA+NON-PROBE: NOT DETECTED
HPIV1 RNA NPH QL NAA+NON-PROBE: NOT DETECTED
HPIV2 RNA NPH QL NAA+NON-PROBE: NOT DETECTED
HPIV3 RNA NPH QL NAA+NON-PROBE: NOT DETECTED
HPIV4 RNA NPH QL NAA+NON-PROBE: NOT DETECTED
IMM GRANULOCYTES # BLD AUTO: 0.03 X10(3)/MCL (ref 0–0.04)
IMM GRANULOCYTES NFR BLD AUTO: 0.3 %
IRON SATN MFR SERPL: 16 % (ref 20–50)
IRON SERPL-MCNC: 28 UG/DL (ref 65–175)
LYMPHOCYTES # BLD AUTO: 1.03 X10(3)/MCL (ref 0.6–4.6)
LYMPHOCYTES NFR BLD AUTO: 11.3 %
M PNEUMO DNA NPH QL NAA+NON-PROBE: NOT DETECTED
MCH RBC QN AUTO: 31.8 PG (ref 27–31)
MCHC RBC AUTO-ENTMCNC: 33.7 G/DL (ref 33–36)
MCV RBC AUTO: 94.6 FL (ref 80–94)
MONOCYTES # BLD AUTO: 0.74 X10(3)/MCL (ref 0.1–1.3)
MONOCYTES NFR BLD AUTO: 8.1 %
MRSA PCR SCRN (OHS): NOT DETECTED
NEUTROPHILS # BLD AUTO: 7.22 X10(3)/MCL (ref 2.1–9.2)
NEUTROPHILS NFR BLD AUTO: 79.7 %
NRBC BLD AUTO-RTO: 0 %
OHS QRS DURATION: 74 MS
OHS QTC CALCULATION: 430 MS
PLATELET # BLD AUTO: 187 X10(3)/MCL (ref 130–400)
PMV BLD AUTO: 9.6 FL (ref 7.4–10.4)
POTASSIUM SERPL-SCNC: 4.1 MMOL/L (ref 3.5–5.1)
PROT SERPL-MCNC: 5.8 GM/DL (ref 5.8–7.6)
RBC # BLD AUTO: 3.33 X10(6)/MCL (ref 4.7–6.1)
RET# (OHS): 0.04 X10E6/UL (ref 0.03–0.1)
RETICULOCYTE COUNT AUTOMATED (OLG): 1.25 % (ref 1.1–2.1)
RSV RNA NPH QL NAA+NON-PROBE: NOT DETECTED
RV+EV RNA NPH QL NAA+NON-PROBE: NOT DETECTED
SODIUM SERPL-SCNC: 133 MMOL/L (ref 136–145)
TIBC SERPL-MCNC: 148 UG/DL (ref 69–240)
TIBC SERPL-MCNC: 176 UG/DL (ref 250–450)
TRANSFERRIN SERPL-MCNC: 160 MG/DL
VIT B12 SERPL-MCNC: 321 PG/ML (ref 213–816)
WBC # SPEC AUTO: 9.08 X10(3)/MCL (ref 4.5–11.5)

## 2024-02-22 PROCEDURE — 63600175 PHARM REV CODE 636 W HCPCS

## 2024-02-22 PROCEDURE — 82607 VITAMIN B-12: CPT | Performed by: STUDENT IN AN ORGANIZED HEALTH CARE EDUCATION/TRAINING PROGRAM

## 2024-02-22 PROCEDURE — 80053 COMPREHEN METABOLIC PANEL: CPT

## 2024-02-22 PROCEDURE — 82728 ASSAY OF FERRITIN: CPT | Performed by: STUDENT IN AN ORGANIZED HEALTH CARE EDUCATION/TRAINING PROGRAM

## 2024-02-22 PROCEDURE — 21400001 HC TELEMETRY ROOM

## 2024-02-22 PROCEDURE — 87641 MR-STAPH DNA AMP PROBE: CPT

## 2024-02-22 PROCEDURE — 85045 AUTOMATED RETICULOCYTE COUNT: CPT | Performed by: STUDENT IN AN ORGANIZED HEALTH CARE EDUCATION/TRAINING PROGRAM

## 2024-02-22 PROCEDURE — 85025 COMPLETE CBC W/AUTO DIFF WBC: CPT

## 2024-02-22 PROCEDURE — 83540 ASSAY OF IRON: CPT | Performed by: STUDENT IN AN ORGANIZED HEALTH CARE EDUCATION/TRAINING PROGRAM

## 2024-02-22 PROCEDURE — 25000003 PHARM REV CODE 250: Performed by: STUDENT IN AN ORGANIZED HEALTH CARE EDUCATION/TRAINING PROGRAM

## 2024-02-22 PROCEDURE — 25000003 PHARM REV CODE 250

## 2024-02-22 PROCEDURE — 63600175 PHARM REV CODE 636 W HCPCS: Performed by: STUDENT IN AN ORGANIZED HEALTH CARE EDUCATION/TRAINING PROGRAM

## 2024-02-22 PROCEDURE — 94761 N-INVAS EAR/PLS OXIMETRY MLT: CPT

## 2024-02-22 RX ADMIN — Medication 6 MG: at 09:02

## 2024-02-22 RX ADMIN — SODIUM CHLORIDE, POTASSIUM CHLORIDE, SODIUM LACTATE AND CALCIUM CHLORIDE: 600; 310; 30; 20 INJECTION, SOLUTION INTRAVENOUS at 10:02

## 2024-02-22 RX ADMIN — AZITHROMYCIN MONOHYDRATE 500 MG: 500 INJECTION, POWDER, LYOPHILIZED, FOR SOLUTION INTRAVENOUS at 02:02

## 2024-02-22 RX ADMIN — VANCOMYCIN HYDROCHLORIDE 1000 MG: 1 INJECTION, POWDER, LYOPHILIZED, FOR SOLUTION INTRAVENOUS at 10:02

## 2024-02-22 RX ADMIN — TAMSULOSIN HYDROCHLORIDE 0.4 MG: 0.4 CAPSULE ORAL at 09:02

## 2024-02-22 RX ADMIN — CEFTRIAXONE SODIUM 1 G: 1 INJECTION, POWDER, FOR SOLUTION INTRAMUSCULAR; INTRAVENOUS at 09:02

## 2024-02-22 NOTE — PROGRESS NOTES
Inpatient Nutrition Assessment    Admit Date: 2/21/2024   Total duration of encounter: 1 day   Patient Age: 88 y.o.    Nutrition Recommendation/Prescription     Continue soft /bite size diet; likes oatmeal/OJ  Will order chocolate boost plus tid; Boost Plus (provides 360 kcal, 14 g protein per serving)   MVI/fe  Biweekly wt  Will monitor nutrition status     Communication of Recommendations: reviewed with patient    Nutrition Assessment     Malnutrition Assessment/Nutrition-Focused Physical Exam    Malnutrition Context: chronic illness (02/22/24 1307)  Malnutrition Level: other (see comments) (pt does not meet malnutrition criteria) (02/22/24 1307)     Weight Loss (Malnutrition): other (see comments) (9% wt loss past year/not significant) (02/22/24 1307)              Muscle Mass (Malnutrition): mild depletion (02/22/24 1307)  [place] Region (Muscle Loss): mild depletion     Clavicle and Acromion Bone Region (Muscle Loss): mild depletion  Scapular Bone Region (Muscle Loss): mild depletion                       A minimum of two characteristics is recommended for diagnosis of either severe or non-severe malnutrition.    Chart Review    Reason Seen: continuous nutrition monitoring    Malnutrition Screening Tool Results   Have you recently lost weight without trying?: No  Have you been eating poorly because of a decreased appetite?: No   MST Score: 0   Diagnosis:  CAP, sepsis, hypotension, parathyroid gland CA/lung mets, SCCA cheek, anemia, hyponatremia, subdural hematoma, benign thyroid nodule     Relevant Medical History: stage 4 parathyroid ca, SCCA cheek, falls     Nutrition-Related Medications: (2-22) IVF LR @ 75ml/hr, zithromax, rocephin, vancomycin   Calorie Containing IV Medications: no significant kcals from medications at this time    Nutrition-Related Labs:  (2-22) H/H 10.6/31.5(L) Gluc 126(H) Bun 23.4 Cr 1.6 Na 133(L) Alb 3.1(L)     Nutrition Orders:   Diet Soft & Bite Sized (IDDSI Level 6)      Appetite/Oral  "Intake: good/% of meals    Factors Affecting Nutritional Intake: none identified    Food/Druze/Cultural Preferences:  likes oatmeal/OJ    Food Allergies: none reported    Wound(s):   none reported    Last Bowel Movement: 24    Comments    () Pt reported he is eating well; good appetite; able to chew chopped food; would like--oral supplement; likes oatmeal/OJ. Pt did report 15# wt loss over past year/ unintentional. Pt with Muslce wasting. Labs acknowledged.     Anthropometrics    Height: 5' 10.98" (180.3 cm)    Last Weight: 70.3 kg (154 lb 15.7 oz) (24) Weight Method: Standard Scale  BMI (Calculated): 21.6  BMI Classification: underweight (BMI less than 22 if >65 years of age)        Ideal Body Weight (IBW), Male: 171.88 lb     % Ideal Body Weight, Male (lb): 90.17 %                 Usual Body Weight (UBW), k.2 kg  % Usual Body Weight: 91.25     Usual Weight Provided By: patient and EMR weight history    Wt Readings from Last 5 Encounters:   24 70.3 kg (154 lb 15.7 oz)   24 70.8 kg (156 lb)   24 71.7 kg (158 lb)   11/15/23 71.8 kg (158 lb 3.2 oz)   10/18/23 72.2 kg (159 lb 3.2 oz)     Weight Change(s) Since Admission: 9% wt loss over past year ; #   Wt Readings from Last 1 Encounters:   24 70.3 kg (154 lb 15.7 oz)   24 1326 70.8 kg (156 lb 1.4 oz)   Admit Weight: 70.8 kg (156 lb 1.4 oz) (24 1326), Weight Method: Standard Scale    Estimated Needs    Weight Used For Calorie Calculations: 70.3 kg (154 lb 15.7 oz)  Energy Calorie Requirements (kcal): 2109 kcal/d 30 umberto/kg  Energy Need Method: Kcal/kg  Weight Used For Protein Calculations: 70.3 kg (154 lb 15.7 oz)  Protein Requirements: 91 gm protein/d; 1.3 gm/kg  Fluid Requirements (mL): 2109 ml/d; 1ml/umberto  Temp (24hrs), Av.7 °F (37.1 °C), Min:98 °F (36.7 °C), Max:100.8 °F (38.2 °C)       Enteral Nutrition    Patient not receiving enteral nutrition at this time.    Parenteral " Nutrition    Patient not receiving parenteral nutrition support at this time.    Evaluation of Received Nutrient Intake    Calories: meeting estimated needs  Protein: meeting estimated needs    Patient Education    Not applicable.    Nutrition Diagnosis     PES: Unintended weight loss related to chronic illness as evidenced by 15# wt loss past year . (new)    Interventions/Goals     Intervention(s): general/healthful diet, commercial beverage, multivitamin/mineral supplement therapy, and collaboration with other providers    Goal: Meet greater than 80% of nutritional needs by follow-up. (new)    Monitoring & Evaluation     Dietitian will monitor food and beverage intake, weight, and electrolyte/renal panel.    Nutrition Risk/Follow-Up: moderate (follow-up in 3-5 days)   Please consult if re-assessment needed sooner.

## 2024-02-22 NOTE — H&P
[facility] Internal Medicine History and Physical     Patient Name: Erik Lundy  MRN: [id number]  Admission Date: 2/21/2024  Current Hospital Day: 2   Code Status: Full Code  Attending Provider: Hamzah Gloria*    Chief Complaint:      Chills, Fever, Weakness, and Sore Throat (C/o chills,  fever, sore throat, weakness x 2 days. Sent from urgent care due to hypotension. States was sent to urgent care from ent clinic due to not feeling well. )      Subjective:     History of Present Illness:  Erik Lundy is a 88 y.o. 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  Patient states he was sent from his ENT clinic appointment due to not feeling well with symptoms of chills, subjective fever, weakness, sore throat for x2 days.    Patient reports being in his usual state of health until x2 days ago when he began experiencing above-mentioned symptoms.  Denies any associated recent sick contacts.  He also experienced constipation x1 day ago in which he took laxative supplementation followed by x6 soft though extensive BMs.  Since this time reports having associated lightheadedness/dizziness and further weakness.  Denies any associated nausea/vomiting, further diarrhea, abdominal pain/abdominal cramping, hematochezia/melena.  Also denies any dysuria, polyuria, pyuria, or hematuria.  Reports having adequate p.o. intake as of recent though has noticed approximately x20 lb weight loss over the past few months.    Per chart review; per oncologist (follows with [facility]), chemotherapy unfortunately was discontinued after 2nd cycle due to poor social situation, concerns of falls at home, chronic  "subdural hematoma, suspected cognitive impairment, generalized weakness, documented hypotension, and patient deemed not safe to continue cytotoxic chemotherapy.  He was referred to follow up with Dr. Stringer in [place], [place] to discuss palliative versus hospice on (02/22/2024).    ED Course: Initial Vitals significant for febrile (100.8), tachypnea (22), tachycardia (105), and hypotension (71/42).  Lab significant for macrocytic anemia (H/H, 10.6/31.5), mild hyponatremia (133), BUN/CR (23.4/1.66; at baseline), BNP (121.9), lactic acid (1.4), troponin (WNL), EKG (WNL).  Influenza/COVID negative.  CXR with findings of bilateral scattered coarse reticulonodular changes suspected for pneumonitis versus mild early infiltrates.  Internal Medicine consulted for further evaluation/management of hypotension.      Review of Systems:  Pertinent positives and negatives listed in HPI. All other systems are reviewed and are negative.    Past Medical History:   Past Medical History:   Diagnosis Date    Skin cancer      Social History     Socioeconomic History    Marital status: Single   Tobacco Use    Smoking status: Never     Passive exposure: Never    Smokeless tobacco: Never   Substance and Sexual Activity    Alcohol use: Yes     Alcohol/week: 3.0 standard drinks of alcohol     Types: 3 Cans of beer per week     Comment: 3 cans of beer on the weekend    Drug use: Never    Sexual activity: Not Currently     History reviewed. No pertinent family history.       Objective:   Vitals  Vitals  BP: (!) 87/53  Temp: 98.4 °F (36.9 °C)  Temp Source: Oral  Pulse: 85  Resp: 18  SpO2: 96 %  Height: 5' 10.98" (180.3 cm)  Weight: 70.3 kg (154 lb 15.7 oz)    Physical Examination:  Physical Exam  Constitutional:       General: He is not in acute distress.     Appearance: He is ill-appearing. He is not toxic-appearing or diaphoretic.      Comments: Well-developed, thin/frail, pleasant elderly gentleman, sitting at bedside, on room air, in no " acute respiratory distress   HENT:      Mouth/Throat:      Mouth: Mucous membranes are moist.      Pharynx: Oropharynx is clear. No oropharyngeal exudate or posterior oropharyngeal erythema.      Comments: Poor dentition  Eyes:      General: No scleral icterus.     Pupils: Pupils are equal, round, and reactive to light.   Neck:      Comments: B/L posterior cervical chain adenopathy (L> R).  Also appreciable right supraclavicular adenopathy  Cardiovascular:      Rate and Rhythm: Regular rhythm. Tachycardia present.      Pulses: Normal pulses.   Pulmonary:      Effort: Pulmonary effort is normal. No respiratory distress.      Breath sounds: Rhonchi (Possible appreciable rhonchi amongst left basilar region) present. No wheezing or rales.   Abdominal:      General: Abdomen is flat. There is no distension.      Palpations: Abdomen is soft.      Tenderness: There is no abdominal tenderness. There is no guarding or rebound.      Hernia: A hernia (Right inguinal hernia appreciated, reducible) is present.   Musculoskeletal:         General: No swelling.      Cervical back: No tenderness.      Right lower leg: No edema.      Left lower leg: No edema.   Lymphadenopathy:      Cervical: Cervical adenopathy present.   Skin:     General: Skin is warm and dry.      Capillary Refill: Capillary refill takes less than 2 seconds.      Findings: No erythema or rash.   Neurological:      General: No focal deficit present.      Mental Status: He is alert and oriented to person, place, and time.   Psychiatric:         Mood and Affect: Mood normal.         Behavior: Behavior normal.         Thought Content: Thought content normal.         Judgment: Judgment normal.           Laboratory:  CMP:  Recent Labs   Lab 02/21/24  1202 02/22/24  0318   * 133*   K 4.6 4.1   CHLORIDE 99 100   CO2 26 24   BUN 19.7 23.4   CREATININE 1.71* 1.66*   GLUCOSE 89 126*   CALCIUM 9.4 8.4*   ALBUMIN 3.9 3.1*   BILITOT 0.9 0.7   AST 19 17   ALT 13 10    ALKPHOS 119 94       CBC:  Recent Labs   Lab 02/21/24  1202 02/22/24  0318   WBC 11.13 9.08   ABSNEUTRO 9.61* 7.22   RBC 4.13* 3.33*   HGB 13.0* 10.6*   HCT 38.4* 31.5*    187   MCV 93.0 94.6*   RDW 13.1 13.0       REVIEWED      Radiology:  X-Ray Chest 1 View    Result Date: 2/21/2024  Bilateral lungs scattered coarse reticulonodular changes suspected for pneumonitis or mild early infiltrates. 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)  6) Macrocytic Anemia  7) Mild Hyponatremia  6) Subdural hematoma (s/p multiple falls)  7) Benign right thyroid lobe nodule  8) BPH; on Flomax    Plan:   - patient admitted to [facility] for further evaluation and management suspected community-acquired pneumonia in setting of immunocompromise state with above-mentioned malignancies  - patient appropriately volume resuscitated with IV isotonic crystalloids at 30 cc/kg  - continue LR at 75 cc/hour  - pending blood cultures x2, urine cultures, respiratory culture/Gram stain, respiratory PCR for possible source  - pending CT thorax for further evaluation of pulmonary parenchyma  - pending iron panel/ferritin/reticulocyte/folate/B12  - strict I&Os/daily weights  - renally dose necessary medications  - discussed at great length patients living situation and code status   -- patient reports having no family within nearby region or POA/medical decision maker, lives alone by himself, ambulates with/without cane at  times, performs ADLs by self though questionable with heme Onc note stating multiple falls with subdural hematoma and poor functional status   -- also discussed code status in which patient would like to remain full code at this time   -- patient did have appointment with Dr. Stringer ([place], [place]) today (2/22) for further discussion of palliative care versus hospice in setting of above malignancies with poor functional status and inability to continue chemotherapy    CODE STATUS: Full Code  Access: PIV  Antimicrobials: Vanc/Cefepime/Flagyl  Diet: Diet Soft & Bite Sized (IDDSI Level 6)   DVT Prophylaxis:  SCDs  GI Prophylaxis: N/A  Fluids: LR @ 75 cc/hr    Disposition:  Patient admitted to [facility] for further evaluation/management of suspected community-acquired pneumonia in setting of immunocompromise cessation from above-mentioned 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  Patient no longer candidate for chemotherapy due to poor functional status; pending further evaluation via Dr. Stringer in [place], [place] for possible palliative therapies versus hospice.      Daniel Lane MD  Internal Medicine PGY-III

## 2024-02-22 NOTE — PLAN OF CARE
Problem: Adult Inpatient Plan of Care  Goal: Plan of Care Review  Outcome: Ongoing, Progressing  Flowsheets (Taken 2/22/2024 0746)  Plan of Care Reviewed With: patient  Goal: Absence of Hospital-Acquired Illness or Injury  Outcome: Ongoing, Progressing  Intervention: Identify and Manage Fall Risk  Flowsheets (Taken 2/22/2024 0746)  Safety Promotion/Fall Prevention:   assistive device/personal item within reach   side rails raised x 2   instructed to call staff for mobility  Intervention: Prevent Skin Injury  Flowsheets (Taken 2/22/2024 0746)  Body Position: position changed independently  Intervention: Prevent and Manage VTE (Venous Thromboembolism) Risk  Flowsheets (Taken 2/22/2024 0746)  Activity Management: Rolling - L1  Intervention: Prevent Infection  Flowsheets (Taken 2/22/2024 0746)  Infection Prevention:   hand hygiene promoted   rest/sleep promoted   single patient room provided     Problem: Fall Injury Risk  Goal: Absence of Fall and Fall-Related Injury  Outcome: Ongoing, Progressing  Intervention: Promote Injury-Free Environment  Flowsheets (Taken 2/22/2024 0746)  Safety Promotion/Fall Prevention:   assistive device/personal item within reach   side rails raised x 2   instructed to call staff for mobility

## 2024-02-22 NOTE — PLAN OF CARE
Problem: Adult Inpatient Plan of Care  Goal: Plan of Care Review  Outcome: Ongoing, Progressing  Goal: Patient-Specific Goal (Individualized)  Outcome: Ongoing, Progressing  Goal: Absence of Hospital-Acquired Illness or Injury  Outcome: Ongoing, Progressing  Goal: Optimal Comfort and Wellbeing  Outcome: Ongoing, Progressing  Goal: Readiness for Transition of Care  Outcome: Ongoing, Progressing     Problem: Fall Injury Risk  Goal: Absence of Fall and Fall-Related Injury  Outcome: Ongoing, Progressing     Problem: Fatigue  Goal: Improved Activity Tolerance  Outcome: Ongoing, Progressing     Problem: Hypertension Comorbidity  Goal: Blood Pressure in Desired Range  Outcome: Ongoing, Progressing

## 2024-02-22 NOTE — PROGRESS NOTES
"Pharmacokinetic Initial Assessment: IV Vancomycin    Assessment/Plan:    Initiate intravenous vancomycin with loading dose of 1750 mg once followed by a maintenance dose of vancomycin 1000 mg IV every 24 hours  Desired empiric serum trough concentration is 10 to 20 mcg/mL  Draw vancomycin trough level 60 min prior to third dose on 2/23 at approximately 2100  Pharmacy will continue to follow and monitor vancomycin.      Please contact pharmacy at extension [phone] with any questions regarding this assessment.     Thank you for the consult,   Venancio Justice       Patient brief summary:  Erik Lundy is a 88 y.o. male initiated on antimicrobial therapy with IV Vancomycin for treatment of suspected lower respiratory infection    Drug Allergies:   Review of patient's allergies indicates:  No Known Allergies    Actual Body Weight:   70.8 kg    Renal Function:   Estimated Creatinine Clearance: 29.9 mL/min (A) (based on SCr of 1.71 mg/dL (H)).,       CBC (last 72 hours):  Recent Labs   Lab Result Units 02/21/24  1202   WBC x10(3)/mcL 11.13   Hgb g/dL 13.0*   Hct % 38.4*   Platelet x10(3)/mcL 219   Mono % % 6.5   Eos % % 0.2   Basophil % % 0.3       Metabolic Panel (last 72 hours):  Recent Labs   Lab Result Units 02/21/24  1202 02/21/24  1354   Sodium Level mmol/L 134*  --    Potassium Level mmol/L 4.6  --    Chloride mmol/L 99  --    Carbon Dioxide mmol/L 26  --    Glucose Level mg/dL 89  --    Glucose, UA   --  Normal   Blood Urea Nitrogen mg/dL 19.7  --    Creatinine mg/dL 1.71*  --    Albumin Level g/dL 3.9  --    Bilirubin Total mg/dL 0.9  --    Alkaline Phosphatase unit/L 119  --    Aspartate Aminotransferase unit/L 19  --    Alanine Aminotransferase unit/L 13  --        Drug levels (last 3 results):  No results for input(s): "VANCOMYCINRA", "VANCORANDOM", "VANCOMYCINPE", "VANCOPEAK", "VANCOMYCINTR", "VANCOTROUGH" in the last 72 hours.    Microbiologic Results:  Microbiology Results (last 7 days)       Procedure Component " Value Units Date/Time    Respiratory Culture [[id number]] Collected: 02/21/24 2104    Order Status: Sent Specimen: Respiratory from Sputum, Induced Updated: 02/21/24 2110    Gram Stain [[id number]]     Order Status: Sent Specimen: Sputum     Blood Culture #1 **CANNOT BE ORDERED STAT** [[id number]] Collected: 02/21/24 1435    Order Status: Resulted Specimen: Blood from Arm, Left Updated: 02/21/24 1454    Blood Culture #2 **CANNOT BE ORDERED STAT** [[id number]] Collected: 02/21/24 1435    Order Status: Resulted Specimen: Blood from Forearm, Left Updated: 02/21/24 1454

## 2024-02-22 NOTE — PLAN OF CARE
02/22/24 0842   Discharge Assessment   Assessment Type Discharge Planning Assessment   Confirmed/corrected address, phone number and insurance Yes   Confirmed Demographics Correct on Facesheet   Source of Information patient   When was your last doctors appointment?   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 Yes   Do you have help at home or someone to help you manage your care at home? No   Prior to hospitilization cognitive status: Alert/Oriented   Current cognitive status: Alert/Oriented   Walking or Climbing Stairs Difficulty no   Dressing/Bathing Difficulty no   Equipment Currently Used at Home none  (RW not being used)   Readmission within 30 days? No   Patient currently being followed by outpatient case management? No   Do you currently have service(s) that help you manage your care at home? No   Do you take prescription medications? Yes  (Simon Gaspar)   Do you have prescription coverage? No   Do you have any problems affording any of your prescribed medications? TBD   Is the patient taking medications as prescribed? yes   Who is going to help you get home at discharge? Self   How do you get to doctors appointments? car, drives self   Are you on dialysis? No   Discharge Plan A Home   DME Needed Upon Discharge  none   Discharge Plan discussed with: Patient   Transition of Care Barriers None   Physical Activity   On average, how many days per week do you engage in moderate to strenuous exercise (like a brisk walk)? 0 days   On average, how many minutes do you engage in exercise at this level? 0 min    Financial Resource Strain   How hard is it for you to pay for the very basics like food, housing, medical care, and heating? Not very   Housing Stability   In the last 12 months, was there a time when you were not able to pay the mortgage or rent on time? N   In the last 12 months, how many places have you lived? 1   In the last 12 months, was there a time when you did not have a steady place to sleep or slept in a shelter (including now)? N   Transportation Needs   In the past 12 months, has lack of transportation kept you from medical appointments or from getting medications? no   In the past 12 months, has lack of transportation kept you from meetings, work, or from getting things needed for daily living? No   Food Insecurity   Within the past 12 months, you worried that your food would run out before you got the money to buy more. Never true   Within the past 12 months, the food you bought just didn't last and you didn't have money to get more. Never true   Stress   Do you feel stress - tense, restless, nervous, or anxious, or unable to sleep at night because your mind is troubled all the time - these days? Not at all   Social Connections   In a typical week, how many times do you talk on the phone with family, friends, or neighbors? Never   How often do you get together with friends or relatives? Never   Are you , , , , never , or living with a partner? Never marrie   Alcohol Use   Q1: How often do you have a drink containing alcohol? Never   Q2: How many drinks containing alcohol do you have on a typical day when you are drinking? None   Q3: How often do you have six or more drinks on one occasion? Never     Pt single with no children and no siblings; Parents ; Pt resides alone; Reports he has no friends other than occasional visit from a neighbor whose name he cannot recall; No emergency contact; Pt independent with ADL's; Previously discharged from [facility];  RW in the home not used; Receives SS benefits; Drives himself to appointments; CM to follow for d/c planning needs.

## 2024-02-23 LAB
ALBUMIN SERPL-MCNC: 2.9 G/DL (ref 3.4–4.8)
ALBUMIN/GLOB SERPL: 1.1 RATIO (ref 1.1–2)
ALP SERPL-CCNC: 88 UNIT/L (ref 40–150)
ALT SERPL-CCNC: 12 UNIT/L (ref 0–55)
AST SERPL-CCNC: 20 UNIT/L (ref 5–34)
BASOPHILS # BLD AUTO: 0.03 X10(3)/MCL
BASOPHILS NFR BLD AUTO: 0.5 %
BILIRUB SERPL-MCNC: 0.3 MG/DL
BUN SERPL-MCNC: 25.9 MG/DL (ref 8.4–25.7)
CALCIUM SERPL-MCNC: 8.2 MG/DL (ref 8.8–10)
CHLORIDE SERPL-SCNC: 99 MMOL/L (ref 98–107)
CO2 SERPL-SCNC: 26 MMOL/L (ref 23–31)
CREAT SERPL-MCNC: 1.48 MG/DL (ref 0.73–1.18)
EOSINOPHIL # BLD AUTO: 0.19 X10(3)/MCL (ref 0–0.9)
EOSINOPHIL NFR BLD AUTO: 2.9 %
ERYTHROCYTE [DISTWIDTH] IN BLOOD BY AUTOMATED COUNT: 13 % (ref 11.5–17)
FOLATE SERPL-MCNC: 4 NG/ML (ref 7–31.4)
GFR SERPLBLD CREATININE-BSD FMLA CKD-EPI: 45 MLS/MIN/1.73/M2
GLOBULIN SER-MCNC: 2.7 GM/DL (ref 2.4–3.5)
GLUCOSE SERPL-MCNC: 113 MG/DL (ref 82–115)
HCT VFR BLD AUTO: 31.1 % (ref 42–52)
HGB BLD-MCNC: 10.3 G/DL (ref 14–18)
IMM GRANULOCYTES # BLD AUTO: 0.02 X10(3)/MCL (ref 0–0.04)
IMM GRANULOCYTES NFR BLD AUTO: 0.3 %
LYMPHOCYTES # BLD AUTO: 1.3 X10(3)/MCL (ref 0.6–4.6)
LYMPHOCYTES NFR BLD AUTO: 19.9 %
MCH RBC QN AUTO: 31.1 PG (ref 27–31)
MCHC RBC AUTO-ENTMCNC: 33.1 G/DL (ref 33–36)
MCV RBC AUTO: 94 FL (ref 80–94)
MONOCYTES # BLD AUTO: 0.74 X10(3)/MCL (ref 0.1–1.3)
MONOCYTES NFR BLD AUTO: 11.3 %
NEUTROPHILS # BLD AUTO: 4.24 X10(3)/MCL (ref 2.1–9.2)
NEUTROPHILS NFR BLD AUTO: 65.1 %
NRBC BLD AUTO-RTO: 0 %
PLATELET # BLD AUTO: 185 X10(3)/MCL (ref 130–400)
PMV BLD AUTO: 10 FL (ref 7.4–10.4)
POTASSIUM SERPL-SCNC: 4.3 MMOL/L (ref 3.5–5.1)
PROT SERPL-MCNC: 5.6 GM/DL (ref 5.8–7.6)
RBC # BLD AUTO: 3.31 X10(6)/MCL (ref 4.7–6.1)
SODIUM SERPL-SCNC: 133 MMOL/L (ref 136–145)
WBC # SPEC AUTO: 6.52 X10(3)/MCL (ref 4.5–11.5)

## 2024-02-23 PROCEDURE — 63600175 PHARM REV CODE 636 W HCPCS

## 2024-02-23 PROCEDURE — 80053 COMPREHEN METABOLIC PANEL: CPT

## 2024-02-23 PROCEDURE — 21400001 HC TELEMETRY ROOM

## 2024-02-23 PROCEDURE — 94799 UNLISTED PULMONARY SVC/PX: CPT | Mod: XB

## 2024-02-23 PROCEDURE — 99900035 HC TECH TIME PER 15 MIN (STAT)

## 2024-02-23 PROCEDURE — 82746 ASSAY OF FOLIC ACID SERUM: CPT | Performed by: STUDENT IN AN ORGANIZED HEALTH CARE EDUCATION/TRAINING PROGRAM

## 2024-02-23 PROCEDURE — 97165 OT EVAL LOW COMPLEX 30 MIN: CPT

## 2024-02-23 PROCEDURE — 94761 N-INVAS EAR/PLS OXIMETRY MLT: CPT

## 2024-02-23 PROCEDURE — 25000003 PHARM REV CODE 250

## 2024-02-23 PROCEDURE — 85025 COMPLETE CBC W/AUTO DIFF WBC: CPT

## 2024-02-23 PROCEDURE — 25000003 PHARM REV CODE 250: Performed by: STUDENT IN AN ORGANIZED HEALTH CARE EDUCATION/TRAINING PROGRAM

## 2024-02-23 PROCEDURE — 97162 PT EVAL MOD COMPLEX 30 MIN: CPT

## 2024-02-23 RX ORDER — IPRATROPIUM BROMIDE AND ALBUTEROL SULFATE 2.5; .5 MG/3ML; MG/3ML
3 SOLUTION RESPIRATORY (INHALATION) EVERY 4 HOURS PRN
Status: DISCONTINUED | OUTPATIENT
Start: 2024-02-23 | End: 2024-02-24 | Stop reason: HOSPADM

## 2024-02-23 RX ORDER — DOXYCYCLINE HYCLATE 100 MG
100 TABLET ORAL EVERY 12 HOURS
Status: DISCONTINUED | OUTPATIENT
Start: 2024-02-23 | End: 2024-02-24 | Stop reason: HOSPADM

## 2024-02-23 RX ORDER — AMOXICILLIN AND CLAVULANATE POTASSIUM 875; 125 MG/1; MG/1
1 TABLET, FILM COATED ORAL EVERY 12 HOURS
Status: DISCONTINUED | OUTPATIENT
Start: 2024-02-23 | End: 2024-02-24 | Stop reason: HOSPADM

## 2024-02-23 RX ORDER — SODIUM CHLORIDE 9 MG/ML
INJECTION, SOLUTION INTRAVENOUS CONTINUOUS
Status: DISCONTINUED | OUTPATIENT
Start: 2024-02-23 | End: 2024-02-24 | Stop reason: HOSPADM

## 2024-02-23 RX ORDER — FOLIC ACID 1 MG/1
1 TABLET ORAL DAILY
Status: DISCONTINUED | OUTPATIENT
Start: 2024-02-23 | End: 2024-02-24 | Stop reason: HOSPADM

## 2024-02-23 RX ADMIN — AZITHROMYCIN MONOHYDRATE 500 MG: 500 INJECTION, POWDER, LYOPHILIZED, FOR SOLUTION INTRAVENOUS at 02:02

## 2024-02-23 RX ADMIN — DOXYCYCLINE HYCLATE 100 MG: 100 TABLET, COATED ORAL at 08:02

## 2024-02-23 RX ADMIN — AMOXICILLIN AND CLAVULANATE POTASSIUM 1 TABLET: 875; 125 TABLET, FILM COATED ORAL at 12:02

## 2024-02-23 RX ADMIN — SODIUM CHLORIDE: 9 INJECTION, SOLUTION INTRAVENOUS at 08:02

## 2024-02-23 RX ADMIN — TAMSULOSIN HYDROCHLORIDE 0.4 MG: 0.4 CAPSULE ORAL at 08:02

## 2024-02-23 RX ADMIN — SODIUM CHLORIDE: 9 INJECTION, SOLUTION INTRAVENOUS at 09:02

## 2024-02-23 RX ADMIN — FOLIC ACID 1 MG: 1 TABLET ORAL at 02:02

## 2024-02-23 RX ADMIN — DOXYCYCLINE HYCLATE 100 MG: 100 TABLET, COATED ORAL at 12:02

## 2024-02-23 RX ADMIN — AMOXICILLIN AND CLAVULANATE POTASSIUM 1 TABLET: 875; 125 TABLET, FILM COATED ORAL at 08:02

## 2024-02-23 RX ADMIN — Medication 6 MG: at 08:02

## 2024-02-23 RX ADMIN — SODIUM CHLORIDE, POTASSIUM CHLORIDE, SODIUM LACTATE AND CALCIUM CHLORIDE: 600; 310; 30; 20 INJECTION, SOLUTION INTRAVENOUS at 02:02

## 2024-02-23 NOTE — PROGRESS NOTES
[facility] Medicine Wards Progress Note     Resident Team: [facility] Medicine List 4  Attending Physician: Hamzah Gloria*  Resident: Daniel Lane  Intern: Kenn Mayen      Subjective:      Brief HPI:  Erik Lundy is a 88 y.o. 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  Patient states he was sent from his ENT clinic appointment due to not feeling well with symptoms of chills, subjective fever, weakness, sore throat for x2 days.     Patient reports being in his usual state of health until x2 days ago when he began experiencing above-mentioned symptoms.  Denies any associated recent sick contacts.  He also experienced constipation x1 day ago in which he took laxative supplementation followed by x6 soft though extensive BMs.  Since this time reports having associated lightheadedness/dizziness and further weakness.  Denies any associated nausea/vomiting, further diarrhea, abdominal pain/abdominal cramping, hematochezia/melena.  Also denies any dysuria, polyuria, pyuria, or hematuria.  Reports having adequate p.o. intake as of recent though has noticed approximately x20 lb weight loss over the past few months.     Per chart review; per oncologist (follows with [facility]), chemotherapy unfortunately was discontinued after 2nd cycle due to poor social situation, concerns of falls at home, chronic subdural hematoma, suspected cognitive impairment, generalized weakness, documented hypotension, and patient deemed not safe to continue cytotoxic chemotherapy.  He was referred to follow up with Dr. Stringer in [place] to discuss palliative versus hospice on (02/22/2024).     ED  Course: Initial Vitals significant for febrile (100.8), tachypnea (22), tachycardia (105), and hypotension (71/42).  Lab significant for macrocytic anemia (H/H, 10.6/31.5), mild hyponatremia (133), BUN/CR (23.4/1.66; at baseline), BNP (121.9), lactic acid (1.4), troponin (WNL), EKG (WNL).  Influenza/COVID negative.  CXR with findings of bilateral scattered coarse reticulonodular changes suspected for pneumonitis versus mild early infiltrates.  Internal Medicine consulted for further evaluation/management of hypotension.    Interval History:   No acute events overnight.  Patient afebrile with no leukocytosis.  Blood cultures negative at 24 hours.  Antibiotics de-escalated to oral Augmentin and doxy which he will likely discharged with for CAP coverage.  Start folate supplementation.      Review of Systems:  ROS completed and negative except as indicated above.     Objective:     Last 24 Hour Vital Signs:  BP  Min: 103/61  Max: 125/69  Temp  Av.1 °F (36.7 °C)  Min: 97.9 °F (36.6 °C)  Max: 98.7 °F (37.1 °C)  Pulse  Av.6  Min: 65  Max: 73  Resp  Av.7  Min: 15  Max: 20  SpO2  Av.7 %  Min: 93 %  Max: 97 %  I/O last 3 completed shifts:  In: 3425.1 [P.O.:120; I.V.:1861.7; IV Piggyback:1443.4]  Out: -     Physical Exam  Constitutional:       General: He is not in acute distress.     Appearance: He is ill-appearing. He is not toxic-appearing or diaphoretic.      Comments: Well-developed, thin/frail, pleasant elderly gentleman, sitting at bedside, on room air, in no acute respiratory distress   HENT:      Mouth/Throat:      Mouth: Mucous membranes are moist.      Pharynx: Oropharynx is clear. No oropharyngeal exudate or posterior oropharyngeal erythema.      Comments: Poor dentition  Eyes:      General: No scleral icterus.     Pupils: Pupils are equal, round, and reactive to light.   Neck:      Comments: B/L posterior cervical chain adenopathy (L> R).  Also appreciable right supraclavicular  adenopathy  Cardiovascular:      Rate and Rhythm: Regular rhythm. Regular rate.     Pulses: Normal pulses.   Pulmonary:      Effort: Pulmonary effort is normal. No respiratory distress.      Breath sounds: Mild Rhonchi present. No wheezing or rales.   Abdominal:      General: Abdomen is flat. There is no distension.      Palpations: Abdomen is soft.      Tenderness: There is no abdominal tenderness. There is no guarding or rebound.      Hernia: A hernia (Right inguinal hernia appreciated, reducible) is present.   Musculoskeletal:         General: No swelling.      Cervical back: No tenderness.      Right lower leg: No edema.      Left lower leg: No edema.   Lymphadenopathy:      Cervical: Cervical adenopathy present.   Skin:     General: Skin is warm and dry.      Capillary Refill: Capillary refill takes less than 2 seconds.      Findings: No erythema or rash.   Neurological:      General: No focal deficit present.      Mental Status: He is alert and oriented to person, place, and time.   Psychiatric:         Mood and Affect: Mood normal.         Behavior: Behavior normal.         Thought Content: Thought content normal.         Judgment: Judgment normal.     Laboratory:  Most Recent Data:  CBC:   Lab Results   Component Value Date    WBC 6.52 02/23/2024    HGB 10.3 (L) 02/23/2024    HCT 31.1 (L) 02/23/2024     02/23/2024    MCV 94.0 02/23/2024    RDW 13.0 02/23/2024     WBC Differential:   Recent Labs   Lab 02/21/24  1202 02/22/24  0318 02/23/24  0327   WBC 11.13 9.08 6.52   HGB 13.0* 10.6* 10.3*   HCT 38.4* 31.5* 31.1*    187 185   MCV 93.0 94.6* 94.0     BMP:   Lab Results   Component Value Date     (L) 02/23/2024    K 4.3 02/23/2024    CO2 26 02/23/2024    BUN 25.9 (H) 02/23/2024    CREATININE 1.48 (H) 02/23/2024    CALCIUM 8.2 (L) 02/23/2024    MG 1.80 08/16/2023     LFTs:   Lab Results   Component Value Date    ALBUMIN 2.9 (L) 02/23/2024    BILITOT 0.3 02/23/2024    AST 20 02/23/2024     "ALKPHOS 88 02/23/2024    ALT 12 02/23/2024     Coags:   Lab Results   Component Value Date    INR 1.2 08/08/2023    PROTIME 14.8 (H) 08/08/2023    PTT 36.3 08/08/2023     FLP: No results found for: "CHOL", "HDL", "LDLCALC", "TRIG", "CHOLHDL"  DM:   Lab Results   Component Value Date    CREATININE 1.48 (H) 02/23/2024     Thyroid:   Lab Results   Component Value Date    TSH 3.372 02/21/2024     Anemia:   Lab Results   Component Value Date    IRON 28 (L) 02/22/2024    TIBC 176 (L) 02/22/2024    FERRITIN 380.04 (H) 02/22/2024    PBUJNFPO65 321 02/22/2024    FOLATE 4.0 (L) 02/23/2024     Cardiac:   Lab Results   Component Value Date    TROPONINI <0.010 02/21/2024    .9 (H) 02/21/2024       Radiology:  Imaging Results              X-Ray Chest 1 View (Final result)  Result time 02/21/24 14:28:38      Final result by Maged Sullivan MD (02/21/24 14:28:38)                   Impression:      Bilateral lungs scattered coarse reticulonodular changes suspected for pneumonitis or mild early infiltrates.      Electronically signed by: Maged Sullivan  Date:    02/21/2024  Time:    14:28               Narrative:    EXAMINATION:  XR CHEST 1 VIEW    CLINICAL HISTORY:  Cough, unspecified    TECHNIQUE:  One view    COMPARISON:  August 8, 2023.    FINDINGS:  Cardiopericardial silhouette is within normal limits.  There are scattered coarse reticulonodular changes of lungs which could represent pneumonitis or mild early mild infiltrates.  No focally dense consolidation or significant fluid within the pleural spaces.                                      Current Medications:     Infusions:   sodium chloride 0.9% 75 mL/hr at 02/23/24 0833        Scheduled:   amoxicillin-clavulanate 875-125mg  1 tablet Oral Q12H    doxycycline  100 mg Oral Q12H    tamsulosin  1 capsule Oral QHS    trimethoprim  100 mg Oral Daily        PRN:  albuterol-ipratropium, melatonin, sodium chloride 0.9%    Assessment & Plan:     Assessment:  1) Concern for " Community Acquired Pneumonia  2) Sepsis; SIRS (3/4; febrile, tachycardic, tachypnea); likely secondary to #1 on admission  3) Hypotension; suspected to be secondary to recent laxative use though possibly currently confounded with sepsis as above   4) Stage 1r-iS5m-Zlavmz 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, proven on biopsy; adenocarcinoma; s/p x2 cycles chemotherapy)  5) 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)  6) Macrocytic Anemia, stable this AM  7) Mild Hyponatremia, stable  6) Subdural hematoma (s/p multiple falls)  7) Benign right thyroid lobe nodule  8) BPH; on Flomax     Plan:   - patient admitted to [facility] for further evaluation and management suspected community-acquired pneumonia in setting of immunocompromise state with above-mentioned malignancies  - patient appropriately volume resuscitated with IV isotonic crystalloids at 30 cc/kg  - continue LR at 75 cc/hour; BP stable   - pending blood cultures NG at 24hrs, urine culture ordered, respiratory culture/Gram stain unremarkable, respiratory panel unremarkable  - CT chest 2/22 displayed small pulmonary nodules, increased previous, and new lytic lesion L1 body pathologic fracture on right  - patient transitioned to Augmentin and Doxy (D1)  - pending iron panel consistent with ICD, folate low at 4, B12 wnl, Retic count 1.25  - strict I&Os/daily weights  - renally dose necessary medications  - discussed at great length patients living situation and code status              -- patient reports having no family within nearby region or POA/medical decision maker, lives alone by himself, ambulates with/without cane at times, performs ADLs by self though questionable with heme Onc note stating multiple falls with subdural hematoma and poor functional status              -- also discussed code status in which patient would like to remain full code at this time              -- patient did have appointment with Dr. 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  Patient no longer candidate for chemotherapy due to poor functional status; pending further evaluation via Qian Kaufman for possible palliative therapies versus hospice. Likely DC tomorrow on oral CAP coverage.       Kenn Mayen, DO  Internal Medicine HO-1

## 2024-02-23 NOTE — PLAN OF CARE
Problem: Adult Inpatient Plan of Care  Goal: Plan of Care Review  Outcome: Ongoing, Progressing  Flowsheets (Taken 2/23/2024 0734)  Plan of Care Reviewed With: patient  Goal: Absence of Hospital-Acquired Illness or Injury  Outcome: Ongoing, Progressing  Intervention: Identify and Manage Fall Risk  Flowsheets (Taken 2/23/2024 0743)  Safety Promotion/Fall Prevention:   assistive device/personal item within reach   side rails raised x 2  Intervention: Prevent Skin Injury  Flowsheets (Taken 2/23/2024 0743)  Body Position:   position changed independently   30 degrees  Intervention: Prevent and Manage VTE (Venous Thromboembolism) Risk  Flowsheets (Taken 2/23/2024 0743)  Activity Management: Rolling - L1  Intervention: Prevent Infection  Flowsheets (Taken 2/23/2024 0743)  Infection Prevention:   hand hygiene promoted   rest/sleep promoted   single patient room provided  Goal: Optimal Comfort and Wellbeing  Outcome: Ongoing, Progressing  Intervention: Monitor Pain and Promote Comfort  Flowsheets (Taken 2/23/2024 0743)  Pain Management Interventions:   quiet environment facilitated   relaxation techniques promoted  Intervention: Provide Person-Centered Care  Flowsheets (Taken 2/23/2024 0743)  Trust Relationship/Rapport:   reassurance provided   choices provided   questions answered   questions encouraged   emotional support provided   thoughts/feelings acknowledged   care explained   empathic listening provided     Problem: Fall Injury Risk  Goal: Absence of Fall and Fall-Related Injury  Outcome: Ongoing, Progressing     Problem: Fatigue  Goal: Improved Activity Tolerance  Outcome: Ongoing, Progressing  Intervention: Promote Improved Energy  Flowsheets (Taken 2/23/2024 0743)  Sleep/Rest Enhancement:   relaxation techniques promoted   regular sleep/rest pattern promoted  Activity Management: Rolling - L1

## 2024-02-23 NOTE — PT/OT/SLP EVAL
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 At this time, patient is functioning at their prior level of function and does not require further acute OT services.     Plan:     During this hospitalization, patient does not require further acute OT services.  Please re-consult if situation changes.    Plan of Care Reviewed with:  patient    Subjective     Chief Complaint: none at this time  Patient/Family Comments/goals: return home, resume all activities    Occupational Profile:  Living Environment: SSH, alone, 3 steps to enter with no handrails; reported his bathroom needs new luis and he is unable to use tub/shower so uses a large plastic tub and performs sponge baths  Previous level of function: I/mod I with ADLs and mobility but with recent falls at home per chart (pt endorses loss of balance but denied falls at home)  Roles and Routines: pt drives  Equipment Used at home: none, other (see comments) (pt has RW but does not use it)  Assistance upon Discharge: none available; no friends/family    Pain/Comfort:  Pain Rating 1: 0/10  Pain Rating Post-Intervention 1: 0/10    Patients cultural, spiritual, Mormonism conflicts given the current situation: no    Objective:     Communicated with: nurse Wyatt prior to session.  Patient found HOB elevated with peripheral IV, telemetry upon OT entry to room.    General Precautions: Standard, fall, hearing impaired  Orthopedic Precautions: N/A  Braces: N/A  Respiratory Status: Room air     BP monitored for orthostatic hypotension throughout session as follows:  Supine: 133/64  Seated EOB: 115/66  Standing EOB :108/73  Standing after ambulating 130 ft : 126/73    Occupational Performance:    Bed Mobility:    Patient completed Supine to Sit with independence  Patient completed Sit to Supine with independence    Functional Mobility/Transfers:  Patient completed Sit <> Stand Transfer with supervision  with  no assistive device   Functional Mobility: SPV to ambulate in pardo with PT, with noted loss of balance but did not require assist to recover (pt able to self-correct)    Activities of Daily Living:  Feeding:  independence .  Grooming: independence standing at sink to brush teeth  Upper Body Dressing: independence .  Lower Body Dressing: modified independence to don socks seated EOB  Toileting:  modified independence .    Cognitive/Visual Perceptual:  Cognitive/Psychosocial Skills:     -       Oriented to: Person, Place, Time, and Situation   -       Follows Commands/attention:Follows multistep  commands and required occasional repetition d/t hard of hearing  -       Safety awareness/insight to disability: intact   -       Mood/Affect/Coping skills/emotional control: Cooperative and Pleasant    Physical Exam:  BUE ArOM WNL, strength 5/5 throughout BUE  FM coordination intact      Treatment & Education:  Pt. educated on orientation to environment and use of call bell for assist with transfers OOB or for any other needs due to fall risk.  Pt verbalized understanding.      Patient left HOB elevated with all lines intact, call button in reach, nurse notified, and pt's bed alarm not functioning; nurse notified.    GOALS:   Multidisciplinary Problems       Occupational Therapy Goals       Not on file                    History:     Past Medical History:   Diagnosis Date    Skin cancer          Past Surgical History:   Procedure Laterality Date    BIOPSY OF THYROID Right        Time Tracking:     OT Date of Treatment: 02/23/24  OT Start Time: 0916  OT Stop Time: 0934  OT Total Time (min): 18 min    Billable Minutes:Evaluation 18    2/23/2024

## 2024-02-23 NOTE — PLAN OF CARE
Problem: Physical Therapy  Goal: Physical Therapy Goal  Description: Goals to be met by: 2024     Patient will increase functional independence with mobility by performin. Sit to stand transfer with [place]  2. Gait  x 260 feet with Modified [place] using No Assistive Device.   3. Ascend/descend 3 stair with no Handrails Modified [place] using No Assistive Device.     Outcome: Ongoing, Progressing

## 2024-02-23 NOTE — PLAN OF CARE
02/23/24 1054   Medicare Message   Important Message from Medicare regarding Discharge Appeal Rights Given to patient/caregiver;Explained to patient/caregiver;Signed/date by patient/caregiver   Date IMM was signed 02/23/24   Time IMM was signed 1059

## 2024-02-23 NOTE — PLAN OF CARE
Problem: Adult Inpatient Plan of Care  Goal: Plan of Care Review  2/22/2024 2321 by Chelo Wylie RN  Outcome: Ongoing, Progressing  2/22/2024 2234 by Chelo Wylie RN  Outcome: Ongoing, Progressing  Goal: Patient-Specific Goal (Individualized)  2/22/2024 2321 by Chelo Wylie RN  Outcome: Ongoing, Progressing  2/22/2024 2234 by Chelo Wylie RN  Outcome: Ongoing, Progressing  Goal: Absence of Hospital-Acquired Illness or Injury  2/22/2024 2321 by Chelo Wylie RN  Outcome: Ongoing, Progressing  2/22/2024 2234 by Chelo Wylie RN  Outcome: Ongoing, Progressing  Goal: Optimal Comfort and Wellbeing  2/22/2024 2321 by Chelo Wylie RN  Outcome: Ongoing, Progressing  2/22/2024 2234 by Chelo Wylie RN  Outcome: Ongoing, Progressing  Goal: Readiness for Transition of Care  2/22/2024 2321 by Chelo Wylie RN  Outcome: Ongoing, Progressing  2/22/2024 2234 by Chelo Wylie RN  Outcome: Ongoing, Progressing     Problem: Fall Injury Risk  Goal: Absence of Fall and Fall-Related Injury  2/22/2024 2321 by Chelo Wylie RN  Outcome: Ongoing, Progressing  2/22/2024 2234 by Chelo Wylie RN  Outcome: Ongoing, Progressing     Problem: Fatigue  Goal: Improved Activity Tolerance  2/22/2024 2321 by Chelo Wylie RN  Outcome: Ongoing, Progressing  2/22/2024 2234 by Chelo Wylie RN  Outcome: Ongoing, Progressing     Problem: Hypertension Comorbidity  Goal: Blood Pressure in Desired Range  2/22/2024 2321 by Chelo Wylie RN  Outcome: Ongoing, Progressing  2/22/2024 2234 by Chelo Wylie RN  Outcome: Ongoing, Progressing

## 2024-02-23 NOTE — PT/OT/SLP EVAL
Physical Therapy Evaluation    Patient Name:  Erik Lundy   MRN:  [id number]    Recommendations:     Therapy Intensity Recommendations at Discharge: Low Intensity Therapy  Discharge Equipment Recommendations: walker, rolling   Equipment to be obtained for discharge: none. Pt has a walker at home. Pt was advised to use walker for ambulating if discharged home before next PT session; verbalized understanding.   Barriers to discharge: fall risk    Assessment:     Erik Lundy is a 88 y.o. 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  He presents with the following impairments/functional limitations:  weakness, impaired endurance, gait instability, impaired balance.    Rehab Prognosis: Good.    Patient would benefit from continued skilled acute PT services to: address above listed impairments/functional limitations; receive patient/caregiver education; reduce fall risk; and maximize independency/safety with functional mobility.    Recent Surgery: * No surgery found *      Plan:     During this hospitalization, patient to be seen 3 x/week to address the identified impairments/functional limitations via gait training, therapeutic activities, therapeutic exercises, neuromuscular re-education and progress toward the established goals.    Plan of Care Expires:  03/24/24    Subjective     Communicated with patient's nurse prior to session.    Patient agreeable to participate  in evaluation.     Chief Complaint: none  Patient/Family Comments/goals: to go home  Pain/Comfort:  Pain Rating 1: 0/10  Pain Rating Post-Intervention 1: 0/10    Patients cultural, spiritual, [employer] conflicts given the current situation: no    Social History  Living Environment: Patient lives alone in a single level home, with 3 steps steps outside.  Functional Level: Prior to admission patient ambulated without assistive device and was independent in ADL's. Pt reports recent falls so advised to use rolling walker upon return home.   Equipment Used at Home: none  Equipment owned (not currently used): rolling walker.  Assistance Upon Discharge: unknown.    Objective:     Patient found supine in bed and with HOB elevated with peripheral IV, telemetry  upon PT entry to room.    General Precautions: Standard,     Orthopedic Precautions:    Braces: N/A  Respiratory Status: room air    Vitals      BP monitored for orthostatic hypotension throughout session as follows:  Supine: 133/64  Seated EOB: 115/66  Standing EOB :108/73  Standing after ambulating 130 ft : 126/73    Exams:  Orientation: Patient is oriented to person, place, time, situation  Commands: Patient follows commands consistently  BILAT UE ROM/strength - defer to OT - see OT note for details  RLE ROM: WFL  RLE Strength: WFL  LLE ROM: WFL  LLE Strength: WFL    Functional Mobility:    Bed Mobility:  Supine to Sit: independence  Sit to Supine: independence    Transfers:  Sit to Stand: supervision with no assistive device    Gait:  Patient ambulated 130ft with no assistive device and stand by assistance.  Patient demonstrates :       LOB noted  with head turns during ambulation but pt able to self-correct       symmetrical step length       upright posture       occasional unsteady gait.    Other Mobility:  not assessed    Balance:  Sit  Static: NORMAL: No deviations seen in posture held statically  Dynamic: NORMAL: No deviations seen in posture held  dynamically  Stand  Static: NORMAL: No deviations seen in posture held statically  Dynamic: FAIR+: Needs CLOSE SUPERVISION during gait and is able to right self with minor LOB    Additional Treatment Session  n/a    Patient left supine in bed and with HOB elevated with all lines intact, call button in reach, tray table at bedside, and patient' nurse notified.    Education     Patient was instructed to utilize staff assistance for mobility/transfers.  White board updated regarding patient's safest level of mobility with staff assistance.    Goals     Multidisciplinary Problems       Physical Therapy Goals          Problem: Physical Therapy    Goal Priority Disciplines Outcome Goal Variances Interventions   Physical Therapy Goal     PT, PT/OT Ongoing, Progressing     Description: Goals to be met by: 2024     Patient will increase functional independence with mobility by performin. Sit to stand transfer with [place]  2. Gait  x 260 feet with Modified [place] using No Assistive Device.   3. Ascend/descend 3 stair with no Handrails Modified [place] using No Assistive Device.                        History:     Past Medical History:   Diagnosis Date    Skin cancer      Past Surgical History:   Procedure Laterality Date    BIOPSY OF THYROID Right      Time Tracking:     PT Received On: 24  PT Start Time: 917     PT Stop Time: 930  PT Total Time (min): 13 min     Billable Minutes: Evaluation , moderate evaluation     2024

## 2024-02-24 VITALS
HEART RATE: 87 BPM | WEIGHT: 155 LBS | DIASTOLIC BLOOD PRESSURE: 94 MMHG | HEIGHT: 71 IN | OXYGEN SATURATION: 95 % | TEMPERATURE: 98 F | RESPIRATION RATE: 17 BRPM | BODY MASS INDEX: 21.7 KG/M2 | SYSTOLIC BLOOD PRESSURE: 138 MMHG

## 2024-02-24 PROBLEM — J18.9 PNEUMONIA DUE TO INFECTIOUS ORGANISM: Status: ACTIVE | Noted: 2024-02-24

## 2024-02-24 LAB
ALBUMIN SERPL-MCNC: 3.2 G/DL (ref 3.4–4.8)
ALBUMIN/GLOB SERPL: 1.1 RATIO (ref 1.1–2)
ALP SERPL-CCNC: 86 UNIT/L (ref 40–150)
ALT SERPL-CCNC: 18 UNIT/L (ref 0–55)
AST SERPL-CCNC: 24 UNIT/L (ref 5–34)
BACTERIA SPEC CULT: NORMAL
BASOPHILS # BLD AUTO: 0.02 X10(3)/MCL
BASOPHILS NFR BLD AUTO: 0.3 %
BILIRUB SERPL-MCNC: 0.3 MG/DL
BUN SERPL-MCNC: 24.5 MG/DL (ref 8.4–25.7)
CALCIUM SERPL-MCNC: 8.6 MG/DL (ref 8.8–10)
CHLORIDE SERPL-SCNC: 99 MMOL/L (ref 98–107)
CO2 SERPL-SCNC: 27 MMOL/L (ref 23–31)
CREAT SERPL-MCNC: 1.39 MG/DL (ref 0.73–1.18)
EOSINOPHIL # BLD AUTO: 0.23 X10(3)/MCL (ref 0–0.9)
EOSINOPHIL NFR BLD AUTO: 3.6 %
ERYTHROCYTE [DISTWIDTH] IN BLOOD BY AUTOMATED COUNT: 13 % (ref 11.5–17)
GFR SERPLBLD CREATININE-BSD FMLA CKD-EPI: 49 MLS/MIN/1.73/M2
GLOBULIN SER-MCNC: 2.8 GM/DL (ref 2.4–3.5)
GLUCOSE SERPL-MCNC: 97 MG/DL (ref 82–115)
GRAM STN SPEC: NORMAL
HCT VFR BLD AUTO: 34 % (ref 42–52)
HGB BLD-MCNC: 11.2 G/DL (ref 14–18)
IMM GRANULOCYTES # BLD AUTO: 0.02 X10(3)/MCL (ref 0–0.04)
IMM GRANULOCYTES NFR BLD AUTO: 0.3 %
LYMPHOCYTES # BLD AUTO: 1.42 X10(3)/MCL (ref 0.6–4.6)
LYMPHOCYTES NFR BLD AUTO: 22 %
MCH RBC QN AUTO: 30.9 PG (ref 27–31)
MCHC RBC AUTO-ENTMCNC: 32.9 G/DL (ref 33–36)
MCV RBC AUTO: 93.9 FL (ref 80–94)
MONOCYTES # BLD AUTO: 0.65 X10(3)/MCL (ref 0.1–1.3)
MONOCYTES NFR BLD AUTO: 10.1 %
NEUTROPHILS # BLD AUTO: 4.11 X10(3)/MCL (ref 2.1–9.2)
NEUTROPHILS NFR BLD AUTO: 63.7 %
NRBC BLD AUTO-RTO: 0 %
PLATELET # BLD AUTO: 207 X10(3)/MCL (ref 130–400)
PMV BLD AUTO: 9.9 FL (ref 7.4–10.4)
POTASSIUM SERPL-SCNC: 4.2 MMOL/L (ref 3.5–5.1)
PROT SERPL-MCNC: 6 GM/DL (ref 5.8–7.6)
RBC # BLD AUTO: 3.62 X10(6)/MCL (ref 4.7–6.1)
SODIUM SERPL-SCNC: 133 MMOL/L (ref 136–145)
WBC # SPEC AUTO: 6.45 X10(3)/MCL (ref 4.5–11.5)

## 2024-02-24 PROCEDURE — 25000003 PHARM REV CODE 250: Performed by: STUDENT IN AN ORGANIZED HEALTH CARE EDUCATION/TRAINING PROGRAM

## 2024-02-24 PROCEDURE — 99900035 HC TECH TIME PER 15 MIN (STAT)

## 2024-02-24 PROCEDURE — 25000003 PHARM REV CODE 250

## 2024-02-24 PROCEDURE — 80053 COMPREHEN METABOLIC PANEL: CPT

## 2024-02-24 PROCEDURE — 85025 COMPLETE CBC W/AUTO DIFF WBC: CPT

## 2024-02-24 PROCEDURE — 94761 N-INVAS EAR/PLS OXIMETRY MLT: CPT

## 2024-02-24 RX ORDER — FOLIC ACID 1 MG/1
1 TABLET ORAL DAILY
Qty: 30 TABLET | Refills: 0 | Status: SHIPPED | OUTPATIENT
Start: 2024-02-25 | End: 2025-02-24

## 2024-02-24 RX ORDER — AMOXICILLIN AND CLAVULANATE POTASSIUM 875; 125 MG/1; MG/1
1 TABLET, FILM COATED ORAL EVERY 12 HOURS
Qty: 8 TABLET | Refills: 0 | Status: SHIPPED | OUTPATIENT
Start: 2024-02-24

## 2024-02-24 RX ORDER — DOXYCYCLINE HYCLATE 100 MG
100 TABLET ORAL EVERY 12 HOURS
Qty: 8 TABLET | Refills: 0 | Status: SHIPPED | OUTPATIENT
Start: 2024-02-24

## 2024-02-24 RX ADMIN — FOLIC ACID 1 MG: 1 TABLET ORAL at 08:02

## 2024-02-24 RX ADMIN — DOXYCYCLINE HYCLATE 100 MG: 100 TABLET, COATED ORAL at 08:02

## 2024-02-24 RX ADMIN — AMOXICILLIN AND CLAVULANATE POTASSIUM 1 TABLET: 875; 125 TABLET, FILM COATED ORAL at 08:02

## 2024-02-24 NOTE — CONSULTS
GO previously spoke with reece boyd with home health. Referral submitted to [facility] via [facility]. Notified on call staff Vaughn @ ([phone]).    Received call from Marce with [facility], pt is accepted.

## 2024-02-24 NOTE — DISCHARGE SUMMARY
[facility] Medicine Discharge Summary    Admitting Physician: Blue Gloria MD  Attending Physician: Hamzah Gloria*  Date of Admit: 2/21/2024  Date of Discharge: 2/24/2024    Condition: Stable  Outcome: Patient tolerated treatment/procedure well without complication and is now ready for discharge.  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    Moderate malnutrition    Adenocarcinoma of parotid gland    Secondary malignancy of right lung    Secondary adenocarcinoma of right lung    History of squamous cell carcinoma of skin    Thyroid nodule    Elevated serum creatinine    Chemotherapy-induced neutropenia    SDH (subdural hematoma)    Falls    Pneumonia due to infectious organism       Principal Problem:  Pneumonia due to infectious organism    Consultants and Procedures     Consultants:  IP CONSULT TO INTERNAL MEDICINE  IP CONSULT TO SOCIAL WORK/CASE MANAGEMENT    Procedures:   * No surgery found *     Brief Admission History      Erik Lundy is a 88 y.o. 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  Patient states he was sent from his ENT clinic appointment due to not feeling well with symptoms of chills, subjective fever, weakness, sore throat for x2 days.     Patient reports being in his usual state of health until x2 days ago when he began experiencing above-mentioned symptoms.   Denies any associated recent sick contacts. He also experienced constipation x1 day ago in which he took laxative supplementation followed by x6 soft though extensive BMs.  Since this time reports having associated lightheadedness/dizziness and further weakness.  Denies any associated nausea/vomiting, further diarrhea, abdominal pain/abdominal cramping, hematochezia/melena.  Also denies any dysuria, polyuria, pyuria, or hematuria.  Reports having adequate p.o. intake as of recent though has noticed approximately x20 lb weight loss over the past few months.     Per chart review; per oncologist (follows with [facility]), chemotherapy unfortunately was discontinued after 2nd cycle due to poor social situation, concerns of falls at home, chronic subdural hematoma, suspected cognitive impairment, generalized weakness, documented hypotension, and patient deemed not safe to continue cytotoxic chemotherapy.  He was referred to follow up with Dr. Stringer in [place], [place] to discuss palliative versus hospice on (02/22/2024).     ED Course: Initial Vitals significant for febrile (100.8), tachypnea (22), tachycardia (105), and hypotension (71/42).  Lab significant for macrocytic anemia (H/H, 10.6/31.5), mild hyponatremia (133), BUN/CR (23.4/1.66; at baseline), BNP (121.9), lactic acid (1.4), troponin (WNL), EKG (WNL).  Influenza/COVID negative.  CXR with findings of bilateral scattered coarse reticulonodular changes suspected for pneumonitis versus mild early infiltrates.  Internal Medicine consulted for further evaluation/management of hypotension.    Hospital Course with Pertinent Findings     While under care at [facility], Mr. 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 CT Chest (2/22) w/ findings of small pulm nodules, increased in interval w/ new lytic lesion at L1 w/ pathologic fracture. Blood cultures, urine cultures, respiratory gram stain/culture, & respiratory panel all unremarkable. He was initiated on broad spectrum abx w/ Vanc/Cefepime/Flagyl w/ transition to Augmentin & Doxy PO. Patient is to continue PO antibiotic for additional x5 days to complete CAP treatment. He is being discharged to home with Home Health assistance. He was also rescheduled to see Dr. Stringer in [place], [place] for further discussion regarding possible palliative (vs) hospice assistance.    Discharge physical exam:  Vitals  BP: (!) 138/94  Temp: 97.7 °F (36.5 °C)  Temp Source: Oral  Pulse: 87  Resp: 17  SpO2: 95 %  Height: 5' 10.98" (180.3 cm)  Weight: 70.3 kg (154 lb 15.7 oz)    Physical Exam  Constitutional:       General: He is not in acute distress.     Appearance: He is ill-appearing. He is not toxic-appearing or diaphoretic.      Comments: Well-developed, thin/frail, pleasant elderly gentleman, sitting at bedside, on room air, in no acute respiratory distress   HENT:      Mouth/Throat:      Mouth: Mucous membranes are moist.      Pharynx: Oropharynx is clear. No oropharyngeal exudate or posterior oropharyngeal erythema.      Comments: Poor dentition  Eyes:      General: No scleral icterus.     Pupils: Pupils are equal, round, and reactive to light.   Neck:      Comments: B/L posterior cervical chain adenopathy (L> R).  Also appreciable right supraclavicular adenopathy  Cardiovascular:      Rate and Rhythm: Regular rhythm. Regular rate.     Pulses: Normal pulses.   Pulmonary:      Effort: Pulmonary effort is normal. No respiratory distress.      Breath sounds: Mild Rhonchi present. No wheezing or rales.   Abdominal:      General: Abdomen is flat. There is no distension.      Palpations: Abdomen is soft.      Tenderness: There is no abdominal tenderness. " There is no guarding or rebound.      Hernia: A hernia (Right inguinal hernia appreciated, reducible) is present.   Musculoskeletal:         General: No swelling.      Cervical back: No tenderness.      Right lower leg: No edema.      Left lower leg: No edema.   Lymphadenopathy:      Cervical: Cervical adenopathy present.   Skin:     General: Skin is warm and dry.      Capillary Refill: Capillary refill takes less than 2 seconds.      Findings: No erythema or rash.   Neurological:      General: No focal deficit present.      Mental Status: He is alert and oriented to person, place, and time.   Psychiatric:         Mood and Affect: Mood normal.         Behavior: Behavior normal.         Thought Content: Thought content normal.         Judgment: Judgment normal.        TIME SPENT ON DISCHARGE: 60 minutes    Discharge Medications        Medication List        START taking these medications      amoxicillin-clavulanate 875-125mg 875-125 mg per tablet  Commonly known as: AUGMENTIN  Take 1 tablet by mouth every 12 (twelve) hours.     doxycycline 100 MG tablet  Commonly known as: VIBRA-TABS  Take 1 tablet (100 mg total) by mouth every 12 (twelve) hours.     folic acid 1 MG tablet  Commonly known as: FOLVITE  Take 1 tablet (1 mg total) by mouth once daily.  Start taking on: February 25, 2024            CONTINUE taking these medications      dexAMETHasone 4 MG Tab  Commonly known as: DECADRON  Take 2 tablets(8 mg) once daily on days 2,3, and 4 of chemotherapy cycle.     ipratropium 21 mcg (0.03 %) nasal spray  Commonly known as: ATROVENT  2 sprays by Each Nostril route 3 (three) times daily.     ondansetron 8 MG tablet  Commonly known as: ZOFRAN  Take 1 tablet (8 mg total) by mouth every 8 (eight) hours as needed for Nausea (Nausea).     tamsulosin 0.4 mg Cap  Commonly known as: FLOMAX  Take 1 capsule (0.4 mg total) by mouth every evening.     trimethoprim 100 mg Tab  Commonly known as: TRIMPEX  Take 100 mg by mouth once  daily.               Discharge Information:   Mr. Erik Lundy is being discharged Home or Self Care.    Discharge Procedure Orders   Ambulatory referral/consult to Outpatient Case Management   Referral Priority: Routine Referral Type: Consultation   Referral Reason: Specialty Services Required   Number of Visits Requested: 1     Activity as tolerated        Follow-Up Appointments:   Follow-up Information       Benedicto Vargas MD Follow up.    Specialty: Family Medicine  Contact information:  [address] [place] [id number]  [phone]               [facility] Follow up.    Specialty: Emergency Medicine  Contact information:  [address] [place] [id number] [phone]             Dr. Nba Stringer (Radiation Oncology). Go on 2/27/2024.    Why: Please follow up with Dr. Nba Stringer in [place], [place] on (2/27/24) at 8:00 am, thank you                             To address at follow-up:    The above information was discussed with the patient in clear terms. He was able to repeat the instructions to me in his own words. All questions answered. ED precautions provided.    Daniel Lane MD  Internal Medicine PGY-III

## 2024-02-26 LAB
BACTERIA BLD CULT: NORMAL
BACTERIA BLD CULT: NORMAL

## 2024-02-26 NOTE — PT/OT/SLP DISCHARGE
POST DISCHARGE DOCUMENTATION - 02/26/2024 7:21 AM    Physical Therapy Discharge Summary    Name: Erik Lundy  MRN: [id number]   Principal Problem: Pneumonia due to infectious organism   1. Hypotension, unspecified hypotension type    2. Cough    3. Weak    4. Fever, unspecified fever cause    5. Right inguinal hernia    6. Parotid gland adenocarcinoma    7. Secondary carcinoma of lung, unspecified laterality    8. Chronic renal impairment, unspecified CKD stage    9. SDH (subdural hematoma)    10. Secondary adenocarcinoma of right lung    11. Carcinoma of parotid gland    12. 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    Moderate malnutrition    Adenocarcinoma of parotid gland    Secondary malignancy of right lung    Secondary adenocarcinoma of right lung    History of squamous cell carcinoma of skin    Thyroid nodule    Elevated serum creatinine    Chemotherapy-induced neutropenia    SDH (subdural hematoma)    Falls    Pneumonia due to infectious organism      Recommendations - per last treatment session     Therapy Intensity Recommendations at Discharge: Low Intensity Therapy  Discharge Equipment Recommendations: walker, rolling     Assessment:     Refer to prior Physical Therapy note for last known functional status of patient.    Patient was unexpectedly discharged from hospital.  Refer to therapy's initial evaluation or last treatment note for patient's most recent functional status and goal achievement and therapists' recommendations.    Objective     GOALS:  Multidisciplinary Problems       Physical Therapy Goals          Problem: Physical Therapy    Goal Priority Disciplines Outcome Goal Variances Interventions    Physical Therapy Goal     PT, PT/OT Not met     Description: Goals to be met by: 2024     Patient will increase functional independence with mobility by performin. Sit to stand transfer with [place]  2. Gait  x 260 feet with Modified [place] using No Assistive Device.   3. Ascend/descend 3 stair with no Handrails Modified [place] using No Assistive Device.                        Plan     Patient Discharged to: home or self care per chart.    2024

## 2024-03-01 ENCOUNTER — PATIENT OUTREACH (OUTPATIENT)
Dept: ADMINISTRATIVE | Facility: CLINIC | Age: 89
End: 2024-03-01
Payer: MEDICARE

## 2024-03-01 NOTE — PROGRESS NOTES
C3 nurse attempted to contact Erik Lundy  for a TCC post hospital discharge follow up call. No answer. No voicemail available.The patient does not have a scheduled HOSFU appointment noted. Unable to message PCP staff.

## 2024-03-22 ENCOUNTER — PATIENT OUTREACH (OUTPATIENT)
Dept: ADMINISTRATIVE | Facility: OTHER | Age: 89
End: 2024-03-22
Payer: MEDICARE

## 2024-03-22 NOTE — PROGRESS NOTES
CHW - Outreach Attempt    Community Health Worker / LPN attempted initial outreach -phone says  call cannot be completed at this time for 1st attempt to contact patient regarding: -Referral for Home Health Services  Community Health Worker / LPN to attempt to contact patient on:03/25/24

## 2024-04-01 NOTE — PROGRESS NOTES
CHW - Outreach Attempt    Community Health Worker / LPN unable to leave a voicemail message for 2nd attempt to contact patient regarding: Home Health Services. Call cannot be completed per recording.  Community Health Worker  / LPN to attempt to contact patient on: 04/04/24

## 2024-04-05 NOTE — PROGRESS NOTES
I have reviewed and agree with the resident's findings, including all diagnostic interpretations and plans as written.     Frandy Rae M.D.

## 2024-04-08 NOTE — PROGRESS NOTES
CHW - Outreach Attempt    Community Health Worker / LPN unable to leave a voicemail message for 3rd attempt. Call cannot be completed in attempt to contact patient regarding: Referral for Home Health services.

## 2024-06-03 PROBLEM — J18.9 PNEUMONIA DUE TO INFECTIOUS ORGANISM: Status: RESOLVED | Noted: 2024-02-24 | Resolved: 2024-06-03

## (undated) DEVICE — SYR 10CC LUER LOCK

## (undated) DEVICE — NDL HYPO REG 25G X 1 1/2

## (undated) DEVICE — Device

## (undated) DEVICE — SUT 3-0 VICRYL / SH (J416)

## (undated) DEVICE — SYR DISP LL 5CC

## (undated) DEVICE — GOWN POLY REINF BRTH SLV XL

## (undated) DEVICE — KIT SURGICAL TURNOVER

## (undated) DEVICE — BAG MEDI-PLAST DECANTER C-FLOW

## (undated) DEVICE — ADHESIVE DERMABOND ADVANCED

## (undated) DEVICE — BLADE SURG STAINLESS STEEL #11

## (undated) DEVICE — SUT PDSII 4-0 PS-2 CLEAR MO

## (undated) DEVICE — CONTAINER SPECIMEN 4.5OZ

## (undated) DEVICE — HANDLE DEVON RIGID OR LIGHT

## (undated) DEVICE — APPLICATOR CHLORAPREP ORN 26ML

## (undated) DEVICE — DRAPE C-ARM COVER EZ 36X28IN